# Patient Record
Sex: MALE | Race: WHITE | Employment: UNEMPLOYED | URBAN - METROPOLITAN AREA
[De-identification: names, ages, dates, MRNs, and addresses within clinical notes are randomized per-mention and may not be internally consistent; named-entity substitution may affect disease eponyms.]

---

## 2018-08-18 ENCOUNTER — HOSPITAL ENCOUNTER (EMERGENCY)
Age: 53
Discharge: HOME | End: 2018-08-18
Attending: EMERGENCY MEDICINE
Payer: MEDICAID

## 2018-08-18 VITALS
HEART RATE: 70 BPM | BODY MASS INDEX: 27.83 KG/M2 | OXYGEN SATURATION: 99 % | RESPIRATION RATE: 18 BRPM | TEMPERATURE: 98.4 F | HEIGHT: 73 IN | SYSTOLIC BLOOD PRESSURE: 107 MMHG | WEIGHT: 210 LBS | DIASTOLIC BLOOD PRESSURE: 65 MMHG

## 2018-08-18 DIAGNOSIS — R06.6 HICCUPS: Primary | ICD-10-CM

## 2018-08-18 LAB
A/G RATIO: 1.6 (ref 1.1–2.2)
ALBUMIN SERPL-MCNC: 4.2 G/DL (ref 3.4–5)
ALP BLD-CCNC: 71 U/L (ref 40–129)
ALT SERPL-CCNC: 12 U/L (ref 10–40)
ANION GAP SERPL CALCULATED.3IONS-SCNC: 10 MMOL/L (ref 3–16)
AST SERPL-CCNC: 14 U/L (ref 15–37)
BASOPHILS ABSOLUTE: 0.2 K/UL (ref 0–0.2)
BASOPHILS RELATIVE PERCENT: 1.2 %
BILIRUB SERPL-MCNC: 0.3 MG/DL (ref 0–1)
BUN BLDV-MCNC: 13 MG/DL (ref 7–20)
CALCIUM SERPL-MCNC: 9.2 MG/DL (ref 8.3–10.6)
CHLORIDE BLD-SCNC: 101 MMOL/L (ref 99–110)
CO2: 28 MMOL/L (ref 21–32)
CREAT SERPL-MCNC: 0.7 MG/DL (ref 0.9–1.3)
EOSINOPHILS ABSOLUTE: 0.2 K/UL (ref 0–0.6)
EOSINOPHILS RELATIVE PERCENT: 1.7 %
GFR AFRICAN AMERICAN: >60
GFR NON-AFRICAN AMERICAN: >60
GLOBULIN: 2.7 G/DL
GLUCOSE BLD-MCNC: 110 MG/DL (ref 70–99)
HCT VFR BLD CALC: 37.8 % (ref 40.5–52.5)
HEMOGLOBIN: 13.2 G/DL (ref 13.5–17.5)
LIPASE: 34 U/L (ref 13–60)
LYMPHOCYTES ABSOLUTE: 2.3 K/UL (ref 1–5.1)
LYMPHOCYTES RELATIVE PERCENT: 17.9 %
MCH RBC QN AUTO: 28.3 PG (ref 26–34)
MCHC RBC AUTO-ENTMCNC: 34.9 G/DL (ref 31–36)
MCV RBC AUTO: 81 FL (ref 80–100)
MONOCYTES ABSOLUTE: 1 K/UL (ref 0–1.3)
MONOCYTES RELATIVE PERCENT: 8.1 %
NEUTROPHILS ABSOLUTE: 9.1 K/UL (ref 1.7–7.7)
NEUTROPHILS RELATIVE PERCENT: 71.1 %
PDW BLD-RTO: 14.1 % (ref 12.4–15.4)
PLATELET # BLD: 255 K/UL (ref 135–450)
PMV BLD AUTO: 7.2 FL (ref 5–10.5)
POTASSIUM SERPL-SCNC: 3.7 MMOL/L (ref 3.5–5.1)
RBC # BLD: 4.66 M/UL (ref 4.2–5.9)
SODIUM BLD-SCNC: 139 MMOL/L (ref 136–145)
TOTAL PROTEIN: 6.9 G/DL (ref 6.4–8.2)
WBC # BLD: 12.8 K/UL (ref 4–11)

## 2018-08-18 PROCEDURE — 96374 THER/PROPH/DIAG INJ IV PUSH: CPT

## 2018-08-18 PROCEDURE — 2500000003 HC RX 250 WO HCPCS: Performed by: EMERGENCY MEDICINE

## 2018-08-18 PROCEDURE — 96361 HYDRATE IV INFUSION ADD-ON: CPT

## 2018-08-18 PROCEDURE — 83690 ASSAY OF LIPASE: CPT

## 2018-08-18 PROCEDURE — 99283 EMERGENCY DEPT VISIT LOW MDM: CPT

## 2018-08-18 PROCEDURE — 80053 COMPREHEN METABOLIC PANEL: CPT

## 2018-08-18 PROCEDURE — 6370000000 HC RX 637 (ALT 250 FOR IP): Performed by: PHYSICIAN ASSISTANT

## 2018-08-18 PROCEDURE — 96372 THER/PROPH/DIAG INJ SC/IM: CPT

## 2018-08-18 PROCEDURE — 85025 COMPLETE CBC W/AUTO DIFF WBC: CPT

## 2018-08-18 PROCEDURE — 2580000003 HC RX 258: Performed by: PHYSICIAN ASSISTANT

## 2018-08-18 PROCEDURE — 6360000002 HC RX W HCPCS: Performed by: PHYSICIAN ASSISTANT

## 2018-08-18 PROCEDURE — 96375 TX/PRO/DX INJ NEW DRUG ADDON: CPT

## 2018-08-18 RX ORDER — 0.9 % SODIUM CHLORIDE 0.9 %
1000 INTRAVENOUS SOLUTION INTRAVENOUS ONCE
Status: COMPLETED | OUTPATIENT
Start: 2018-08-18 | End: 2018-08-18

## 2018-08-18 RX ORDER — METOCLOPRAMIDE HYDROCHLORIDE 5 MG/ML
10 INJECTION INTRAMUSCULAR; INTRAVENOUS ONCE
Status: COMPLETED | OUTPATIENT
Start: 2018-08-18 | End: 2018-08-18

## 2018-08-18 RX ORDER — METOCLOPRAMIDE 10 MG/1
10 TABLET ORAL 4 TIMES DAILY
Qty: 30 TABLET | Refills: 0 | Status: SHIPPED | OUTPATIENT
Start: 2018-08-18 | End: 2019-08-22

## 2018-08-18 RX ORDER — CHLORPROMAZINE HYDROCHLORIDE 25 MG/ML
25 INJECTION INTRAMUSCULAR ONCE
Status: COMPLETED | OUTPATIENT
Start: 2018-08-18 | End: 2018-08-18

## 2018-08-18 RX ORDER — FAMOTIDINE 20 MG/1
20 TABLET, FILM COATED ORAL 2 TIMES DAILY
Qty: 60 TABLET | Refills: 0 | Status: SHIPPED | OUTPATIENT
Start: 2018-08-18 | End: 2019-08-22

## 2018-08-18 RX ORDER — ONDANSETRON 2 MG/ML
4 INJECTION INTRAMUSCULAR; INTRAVENOUS ONCE
Status: COMPLETED | OUTPATIENT
Start: 2018-08-18 | End: 2018-08-18

## 2018-08-18 RX ORDER — FAMOTIDINE 20 MG/1
40 TABLET, FILM COATED ORAL ONCE
Status: COMPLETED | OUTPATIENT
Start: 2018-08-18 | End: 2018-08-18

## 2018-08-18 RX ADMIN — ONDANSETRON HYDROCHLORIDE 4 MG: 2 INJECTION, SOLUTION INTRAMUSCULAR; INTRAVENOUS at 20:18

## 2018-08-18 RX ADMIN — METOCLOPRAMIDE 10 MG: 5 INJECTION, SOLUTION INTRAMUSCULAR; INTRAVENOUS at 20:18

## 2018-08-18 RX ADMIN — FAMOTIDINE 40 MG: 20 TABLET, FILM COATED ORAL at 20:18

## 2018-08-18 RX ADMIN — CHLORPROMAZINE HYDROCHLORIDE 25 MG: 25 INJECTION INTRAMUSCULAR at 21:30

## 2018-08-18 RX ADMIN — SODIUM CHLORIDE 1000 ML: 9 INJECTION, SOLUTION INTRAVENOUS at 20:18

## 2018-08-18 ASSESSMENT — PAIN SCALES - GENERAL: PAINLEVEL_OUTOF10: 7

## 2018-08-18 ASSESSMENT — PAIN DESCRIPTION - PAIN TYPE: TYPE: ACUTE PAIN

## 2018-08-18 NOTE — ED PROVIDER NOTES
08/18/18 2208   BP: 129/71 136/73 107/65   Pulse: 83 74 70   Resp: 18     Temp: 98.4 °F (36.9 °C)     TempSrc: Oral     SpO2: 98% 98% 99%   Weight: 210 lb (95.3 kg)     Height: 6' 1\" (1.854 m)         Patient was given the following medications:  Medications   0.9 % sodium chloride bolus (0 mLs Intravenous Stopped 8/18/18 2135)   metoclopramide (REGLAN) injection 10 mg (10 mg Intravenous Given 8/18/18 2018)   ondansetron (ZOFRAN) injection 4 mg (4 mg Intravenous Given 8/18/18 2018)   famotidine (PEPCID) tablet 40 mg (40 mg Oral Given 8/18/18 2018)   chlorproMAZINE (THORAZINE) injection 25 mg (25 mg Intramuscular Given 8/18/18 2130)     Patient presents to the emergency department with hiccups. He is afebrile, appeared nontoxic and hemodynamically stable. CBC shows leukocytosis of 12.8 with left shift of 9.1. Hemoglobin of 13.2 and hematocrit of 37.8. CMP shows glucose of 110, creatinine 0.7 and AST of 14. Lipase is within normal range. The patient was given IV fluids, Compazine, Zofran, Thorazine and Pepcid here. He states that his hiccups are gone. I feel that it is safe to discharge patient home and have him follow-up with primary care. I will discharge him with prescriptions for Pepcid and Reglan. Instructed him to come back for any new, concerning or worsening symptoms. The patient tolerated their visit well. They were seen and evaluated by the attending physician who agreed with the assessment and plan. The patient and / or the family were informed of the results of any tests, a time was given to answer questions, a plan was proposed and they agreed with plan. FINAL IMPRESSION      1.  Hiccups          DISPOSITION/PLAN   DISPOSITION Decision To Discharge 08/18/2018 08:50:42 PM      PATIENT REFERRED TO:  DO Dale Barton 42  [phone]    Schedule an appointment as soon as possible for a visit       [facility] ED  3000

## 2018-08-19 NOTE — ED NOTES
Pt called out from room to report that hiccups has returned. Relayed this to Dr. Karthik Cannon. No new verbal orders.       Kasey Moses RN  08/18/18 9433

## 2019-08-22 ENCOUNTER — HOSPITAL ENCOUNTER (EMERGENCY)
Age: 54
Discharge: HOME | End: 2019-08-22

## 2019-08-22 ENCOUNTER — APPOINTMENT (OUTPATIENT)
Dept: GENERAL RADIOLOGY | Age: 54
End: 2019-08-22

## 2019-08-22 VITALS
OXYGEN SATURATION: 98 % | HEART RATE: 80 BPM | RESPIRATION RATE: 16 BRPM | WEIGHT: 220 LBS | HEIGHT: 73 IN | SYSTOLIC BLOOD PRESSURE: 132 MMHG | DIASTOLIC BLOOD PRESSURE: 77 MMHG | BODY MASS INDEX: 29.16 KG/M2 | TEMPERATURE: 97.9 F

## 2019-08-22 DIAGNOSIS — T14.8XXA WOUND INFECTION: Primary | ICD-10-CM

## 2019-08-22 DIAGNOSIS — L03.116 CELLULITIS OF LEFT LOWER EXTREMITY: ICD-10-CM

## 2019-08-22 DIAGNOSIS — L08.9 WOUND INFECTION: Primary | ICD-10-CM

## 2019-08-22 LAB
A/G RATIO: 1.3 (ref 1.1–2.2)
ALBUMIN SERPL-MCNC: 4.4 G/DL (ref 3.4–5)
ALP BLD-CCNC: 91 U/L (ref 40–129)
ALT SERPL-CCNC: 35 U/L (ref 10–40)
ANION GAP SERPL CALCULATED.3IONS-SCNC: 15 MMOL/L (ref 3–16)
AST SERPL-CCNC: 28 U/L (ref 15–37)
BASOPHILS ABSOLUTE: 0 K/UL (ref 0–0.2)
BASOPHILS RELATIVE PERCENT: 0.1 %
BILIRUB SERPL-MCNC: 0.4 MG/DL (ref 0–1)
BUN BLDV-MCNC: 11 MG/DL (ref 7–20)
CALCIUM SERPL-MCNC: 9.4 MG/DL (ref 8.3–10.6)
CHLORIDE BLD-SCNC: 96 MMOL/L (ref 99–110)
CO2: 26 MMOL/L (ref 21–32)
CREAT SERPL-MCNC: 1.3 MG/DL (ref 0.9–1.3)
EOSINOPHILS ABSOLUTE: 0.1 K/UL (ref 0–0.6)
EOSINOPHILS RELATIVE PERCENT: 1.3 %
GFR AFRICAN AMERICAN: >60
GFR NON-AFRICAN AMERICAN: 57
GLOBULIN: 3.5 G/DL
GLUCOSE BLD-MCNC: 119 MG/DL (ref 70–99)
HCT VFR BLD CALC: 38.6 % (ref 40.5–52.5)
HEMOGLOBIN: 13.6 G/DL (ref 13.5–17.5)
LACTIC ACID, SEPSIS: 1.7 MMOL/L (ref 0.4–1.9)
LYMPHOCYTES ABSOLUTE: 1.9 K/UL (ref 1–5.1)
LYMPHOCYTES RELATIVE PERCENT: 21.1 %
MCH RBC QN AUTO: 30.4 PG (ref 26–34)
MCHC RBC AUTO-ENTMCNC: 35.3 G/DL (ref 31–36)
MCV RBC AUTO: 86.1 FL (ref 80–100)
MONOCYTES ABSOLUTE: 0.6 K/UL (ref 0–1.3)
MONOCYTES RELATIVE PERCENT: 6.7 %
NEUTROPHILS ABSOLUTE: 6.4 K/UL (ref 1.7–7.7)
NEUTROPHILS RELATIVE PERCENT: 70.8 %
PDW BLD-RTO: 12.8 % (ref 12.4–15.4)
PLATELET # BLD: 308 K/UL (ref 135–450)
PMV BLD AUTO: 7 FL (ref 5–10.5)
POTASSIUM REFLEX MAGNESIUM: 3.8 MMOL/L (ref 3.5–5.1)
RBC # BLD: 4.49 M/UL (ref 4.2–5.9)
SODIUM BLD-SCNC: 137 MMOL/L (ref 136–145)
TOTAL PROTEIN: 7.9 G/DL (ref 6.4–8.2)
WBC # BLD: 9.1 K/UL (ref 4–11)

## 2019-08-22 PROCEDURE — 83605 ASSAY OF LACTIC ACID: CPT

## 2019-08-22 PROCEDURE — 73590 X-RAY EXAM OF LOWER LEG: CPT

## 2019-08-22 PROCEDURE — 90471 IMMUNIZATION ADMIN: CPT | Performed by: PHYSICIAN ASSISTANT

## 2019-08-22 PROCEDURE — 6360000002 HC RX W HCPCS: Performed by: PHYSICIAN ASSISTANT

## 2019-08-22 PROCEDURE — 99284 EMERGENCY DEPT VISIT MOD MDM: CPT

## 2019-08-22 PROCEDURE — 85025 COMPLETE CBC W/AUTO DIFF WBC: CPT

## 2019-08-22 PROCEDURE — 80053 COMPREHEN METABOLIC PANEL: CPT

## 2019-08-22 PROCEDURE — 6370000000 HC RX 637 (ALT 250 FOR IP): Performed by: PHYSICIAN ASSISTANT

## 2019-08-22 PROCEDURE — 90715 TDAP VACCINE 7 YRS/> IM: CPT | Performed by: PHYSICIAN ASSISTANT

## 2019-08-22 RX ORDER — CEPHALEXIN 500 MG/1
500 CAPSULE ORAL 4 TIMES DAILY
Qty: 28 CAPSULE | Refills: 0 | Status: SHIPPED | OUTPATIENT
Start: 2019-08-22 | End: 2019-08-29

## 2019-08-22 RX ORDER — CEPHALEXIN 500 MG/1
500 CAPSULE ORAL ONCE
Status: COMPLETED | OUTPATIENT
Start: 2019-08-22 | End: 2019-08-22

## 2019-08-22 RX ORDER — SULFAMETHOXAZOLE AND TRIMETHOPRIM 800; 160 MG/1; MG/1
1 TABLET ORAL ONCE
Status: COMPLETED | OUTPATIENT
Start: 2019-08-22 | End: 2019-08-22

## 2019-08-22 RX ORDER — SULFAMETHOXAZOLE AND TRIMETHOPRIM 800; 160 MG/1; MG/1
1 TABLET ORAL 2 TIMES DAILY
Qty: 14 TABLET | Refills: 0 | Status: SHIPPED | OUTPATIENT
Start: 2019-08-22 | End: 2019-08-29

## 2019-08-22 RX ORDER — HYDROCODONE BITARTRATE AND ACETAMINOPHEN 5; 325 MG/1; MG/1
1 TABLET ORAL EVERY 6 HOURS PRN
Qty: 6 TABLET | Refills: 0 | Status: SHIPPED | OUTPATIENT
Start: 2019-08-22 | End: 2019-08-25

## 2019-08-22 RX ORDER — HYDROCODONE BITARTRATE AND ACETAMINOPHEN 5; 325 MG/1; MG/1
1 TABLET ORAL ONCE
Status: DISCONTINUED | OUTPATIENT
Start: 2019-08-22 | End: 2019-08-22 | Stop reason: HOSPADM

## 2019-08-22 RX ADMIN — CEPHALEXIN 500 MG: 500 CAPSULE ORAL at 19:55

## 2019-08-22 RX ADMIN — SULFAMETHOXAZOLE AND TRIMETHOPRIM 1 TABLET: 800; 160 TABLET ORAL at 19:55

## 2019-08-22 RX ADMIN — TETANUS TOXOID, REDUCED DIPHTHERIA TOXOID AND ACELLULAR PERTUSSIS VACCINE, ADSORBED 0.5 ML: 5; 2.5; 8; 8; 2.5 SUSPENSION INTRAMUSCULAR at 19:02

## 2019-08-22 ASSESSMENT — PAIN DESCRIPTION - PAIN TYPE: TYPE: ACUTE PAIN

## 2019-08-22 ASSESSMENT — PAIN SCALES - GENERAL
PAINLEVEL_OUTOF10: 8
PAINLEVEL_OUTOF10: 8

## 2019-08-22 ASSESSMENT — ENCOUNTER SYMPTOMS
RESPIRATORY NEGATIVE: 1
GASTROINTESTINAL NEGATIVE: 1

## 2019-08-22 ASSESSMENT — PAIN DESCRIPTION - LOCATION: LOCATION: LEG

## 2019-08-22 ASSESSMENT — PAIN DESCRIPTION - ORIENTATION: ORIENTATION: LEFT;LOWER

## 2019-08-22 ASSESSMENT — PAIN DESCRIPTION - DESCRIPTORS: DESCRIPTORS: SHARP;SHOOTING

## 2019-08-23 NOTE — ED NOTES
Discharge instructions and prescriptions reviewed with patient. Patient denies having questions. Patient made aware not to drive on [place] as it is a narcotic. Patient stated he understood. Patient denies needs. Patient left in stable condition.       Wendi Ramirez RN  08/22/19 2038

## 2019-09-12 ENCOUNTER — HOSPITAL ENCOUNTER (EMERGENCY)
Age: 54
Discharge: HOME | End: 2019-09-12

## 2019-09-12 VITALS
DIASTOLIC BLOOD PRESSURE: 93 MMHG | BODY MASS INDEX: 29.16 KG/M2 | WEIGHT: 220 LBS | TEMPERATURE: 99.5 F | RESPIRATION RATE: 18 BRPM | HEIGHT: 73 IN | OXYGEN SATURATION: 99 % | SYSTOLIC BLOOD PRESSURE: 132 MMHG | HEART RATE: 93 BPM

## 2019-09-12 DIAGNOSIS — S81.812A LACERATION OF LEFT LOWER EXTREMITY, INITIAL ENCOUNTER: ICD-10-CM

## 2019-09-12 DIAGNOSIS — M79.89 LEG SWELLING: ICD-10-CM

## 2019-09-12 DIAGNOSIS — L03.116 CELLULITIS OF LEFT LOWER EXTREMITY: Primary | ICD-10-CM

## 2019-09-12 LAB
A/G RATIO: 1.4 (ref 1.1–2.2)
ALBUMIN SERPL-MCNC: 4.6 G/DL (ref 3.4–5)
ALP BLD-CCNC: 79 U/L (ref 40–129)
ALT SERPL-CCNC: 41 U/L (ref 10–40)
ANION GAP SERPL CALCULATED.3IONS-SCNC: 11 MMOL/L (ref 3–16)
AST SERPL-CCNC: 31 U/L (ref 15–37)
BASOPHILS ABSOLUTE: 0.1 K/UL (ref 0–0.2)
BASOPHILS RELATIVE PERCENT: 0.9 %
BILIRUB SERPL-MCNC: 0.7 MG/DL (ref 0–1)
BUN BLDV-MCNC: 9 MG/DL (ref 7–20)
CALCIUM SERPL-MCNC: 9.5 MG/DL (ref 8.3–10.6)
CHLORIDE BLD-SCNC: 98 MMOL/L (ref 99–110)
CO2: 24 MMOL/L (ref 21–32)
CREAT SERPL-MCNC: 1 MG/DL (ref 0.9–1.3)
EOSINOPHILS ABSOLUTE: 0.1 K/UL (ref 0–0.6)
EOSINOPHILS RELATIVE PERCENT: 0.8 %
GFR AFRICAN AMERICAN: >60
GFR NON-AFRICAN AMERICAN: >60
GLOBULIN: 3.4 G/DL
GLUCOSE BLD-MCNC: 109 MG/DL (ref 70–99)
HCT VFR BLD CALC: 38.6 % (ref 40.5–52.5)
HEMOGLOBIN: 13.2 G/DL (ref 13.5–17.5)
LACTIC ACID, SEPSIS: 0.8 MMOL/L (ref 0.4–1.9)
LYMPHOCYTES ABSOLUTE: 1.6 K/UL (ref 1–5.1)
LYMPHOCYTES RELATIVE PERCENT: 13.3 %
MCH RBC QN AUTO: 30.1 PG (ref 26–34)
MCHC RBC AUTO-ENTMCNC: 34.2 G/DL (ref 31–36)
MCV RBC AUTO: 88 FL (ref 80–100)
MONOCYTES ABSOLUTE: 0.9 K/UL (ref 0–1.3)
MONOCYTES RELATIVE PERCENT: 7.7 %
NEUTROPHILS ABSOLUTE: 9.4 K/UL (ref 1.7–7.7)
NEUTROPHILS RELATIVE PERCENT: 77.3 %
PDW BLD-RTO: 13.4 % (ref 12.4–15.4)
PLATELET # BLD: 254 K/UL (ref 135–450)
PMV BLD AUTO: 7.1 FL (ref 5–10.5)
POTASSIUM SERPL-SCNC: 3.9 MMOL/L (ref 3.5–5.1)
RBC # BLD: 4.39 M/UL (ref 4.2–5.9)
SODIUM BLD-SCNC: 133 MMOL/L (ref 136–145)
TOTAL PROTEIN: 8 G/DL (ref 6.4–8.2)
WBC # BLD: 12.2 K/UL (ref 4–11)

## 2019-09-12 PROCEDURE — 85025 COMPLETE CBC W/AUTO DIFF WBC: CPT

## 2019-09-12 PROCEDURE — 6370000000 HC RX 637 (ALT 250 FOR IP): Performed by: PHYSICIAN ASSISTANT

## 2019-09-12 PROCEDURE — 99283 EMERGENCY DEPT VISIT LOW MDM: CPT

## 2019-09-12 PROCEDURE — 6360000002 HC RX W HCPCS: Performed by: PHYSICIAN ASSISTANT

## 2019-09-12 PROCEDURE — 87040 BLOOD CULTURE FOR BACTERIA: CPT

## 2019-09-12 PROCEDURE — 87070 CULTURE OTHR SPECIMN AEROBIC: CPT

## 2019-09-12 PROCEDURE — 83605 ASSAY OF LACTIC ACID: CPT

## 2019-09-12 PROCEDURE — 87077 CULTURE AEROBIC IDENTIFY: CPT

## 2019-09-12 PROCEDURE — 2580000003 HC RX 258: Performed by: PHYSICIAN ASSISTANT

## 2019-09-12 PROCEDURE — 87186 SC STD MICRODIL/AGAR DIL: CPT

## 2019-09-12 PROCEDURE — 80053 COMPREHEN METABOLIC PANEL: CPT

## 2019-09-12 PROCEDURE — 96365 THER/PROPH/DIAG IV INF INIT: CPT

## 2019-09-12 PROCEDURE — 87205 SMEAR GRAM STAIN: CPT

## 2019-09-12 RX ORDER — 0.9 % SODIUM CHLORIDE 0.9 %
30 INTRAVENOUS SOLUTION INTRAVENOUS ONCE
Status: COMPLETED | OUTPATIENT
Start: 2019-09-12 | End: 2019-09-12

## 2019-09-12 RX ORDER — CLINDAMYCIN HYDROCHLORIDE 150 MG/1
450 CAPSULE ORAL 3 TIMES DAILY
Qty: 90 CAPSULE | Refills: 0 | Status: SHIPPED | OUTPATIENT
Start: 2019-09-12 | End: 2019-09-22

## 2019-09-12 RX ORDER — CLINDAMYCIN HYDROCHLORIDE 150 MG/1
450 CAPSULE ORAL ONCE
Status: COMPLETED | OUTPATIENT
Start: 2019-09-12 | End: 2019-09-12

## 2019-09-12 RX ADMIN — PIPERACILLIN SODIUM AND TAZOBACTAM SODIUM 3.38 G: 3; .375 INJECTION, POWDER, LYOPHILIZED, FOR SOLUTION INTRAVENOUS at 20:47

## 2019-09-12 RX ADMIN — CLINDAMYCIN HYDROCHLORIDE 450 MG: 150 CAPSULE ORAL at 22:06

## 2019-09-12 RX ADMIN — SODIUM CHLORIDE 2994 ML: 9 INJECTION, SOLUTION INTRAVENOUS at 20:46

## 2019-09-12 ASSESSMENT — PAIN DESCRIPTION - PAIN TYPE: TYPE: ACUTE PAIN;CHRONIC PAIN

## 2019-09-12 ASSESSMENT — PAIN SCALES - GENERAL: PAINLEVEL_OUTOF10: 10

## 2019-09-12 ASSESSMENT — PAIN DESCRIPTION - ORIENTATION: ORIENTATION: LEFT

## 2019-09-12 ASSESSMENT — PAIN DESCRIPTION - LOCATION: LOCATION: LEG

## 2019-09-14 ASSESSMENT — ENCOUNTER SYMPTOMS
COLOR CHANGE: 0
COUGH: 0
VOMITING: 0
NAUSEA: 0
SHORTNESS OF BREATH: 0

## 2019-09-14 NOTE — ED PROVIDER NOTES
ALLERGIES     Patient has no known allergies. FAMILY HISTORY     History reviewed. No pertinent family history. SOCIAL HISTORY       Social History     Socioeconomic History    Marital status:      Spouse name: None    Number of children: None    Years of education: None    Highest education level: None   Occupational History    None   Social Needs    Financial resource strain: None    Food insecurity:     Worry: None     Inability: None    Transportation needs:     Medical: None     Non-medical: None   Tobacco Use    Smoking status: Never Smoker    Smokeless tobacco: Never Used   Substance and Sexual Activity    Alcohol use: Yes     Alcohol/week: 4.0 standard drinks     Types: 4 Cans of beer per week     Comment: occ    Drug use: Not Currently     Types: Marijuana    Sexual activity: None   Lifestyle    Physical activity:     Days per week: None     Minutes per session: None    Stress: None   Relationships    Social connections:     Talks on phone: None     Gets together: None     Attends Congregational service: None     Active member of club or organization: None     Attends meetings of clubs or organizations: None     Relationship status: None    Intimate partner violence:     Fear of current or ex partner: None     Emotionally abused: None     Physically abused: None     Forced sexual activity: None   Other Topics Concern    None   Social History Narrative    None       SCREENINGS     NIH Score       [facility]     Heart Score         PHYSICAL EXAM    (up to 7 for level 4, 8 ormore for level 5)     ED Triage Vitals [09/12/19 1927]   BP Temp Temp Source Pulse Resp SpO2 Height Weight   137/82 99.5 °F (37.5 °C) Oral 98 18 96 % 6' 1\" (1.854 m) 220 lb (99.8 kg)       Physical Exam   Constitutional: He appears well-developed and well-nourished. HENT:   Head: Normocephalic.    Nose: Nose normal.   Mouth/Throat: Oropharynx is clear and moist. No oropharyngeal exudate. Eyes: Pupils are equal, round, and reactive to light. Conjunctivae and EOM are normal. Right eye exhibits no discharge. Left eye exhibits no discharge. Neck: Normal range of motion. Cardiovascular: Normal rate, regular rhythm and normal heart sounds. Exam reveals no gallop and no friction rub. No murmur heard. Pulmonary/Chest: Effort normal and breath sounds normal. No respiratory distress. He has no wheezes. Abdominal: Soft. Bowel sounds are normal. He exhibits no distension. There is no tenderness. Musculoskeletal: Normal range of motion. Neurological: He is alert. Skin: Skin is warm and dry. He is not diaphoretic. Psychiatric: He has a normal mood and affect. His behavior is normal.   Nursing note and vitals reviewed.           DIAGNOSTIC RESULTS   LABS:    Labs Reviewed   WOUND CULTURE - Abnormal; Notable for the following components:       Result Value    Gram Stain Result   (*)     Value: 1+ WBC's  1+ Gram positive cocci      Organism Staphylococcus aureus (*)     All other components within normal limits    Narrative:     ORDER#: [id number]                          ORDERED BY: DAWIT CALDERON  SOURCE: [employer]                           COLLECTED:  09/12/19 20:14  ANTIBIOTICS AT [facility].:                      RECEIVED :  09/13/19 07:19  Performed at:  [place] [address]   Phone [phone]   CBC WITH AUTO DIFFERENTIAL - Abnormal; Notable for the following components:    WBC 12.2 (*)     Hemoglobin 13.2 (*)     Hematocrit 38.6 (*)     Neutrophils Absolute 9.4 (*)     All other components within normal limits    Narrative:     Performed at:  [facility]) - [facility] [address],  [place], [facility]   Phone [phone]   COMPREHENSIVE METABOLIC PANEL - Abnormal; Notable for the following components:    Sodium 133 (*)     Chloride 98 (*)     Glucose 109 (*)     ALT 41 (*)     All other

## 2019-09-15 LAB
GRAM STAIN RESULT: ABNORMAL
ORGANISM: ABNORMAL
WOUND/ABSCESS: ABNORMAL

## 2019-09-17 LAB
BLOOD CULTURE, ROUTINE: NORMAL
CULTURE, BLOOD 2: NORMAL

## 2020-12-24 ENCOUNTER — APPOINTMENT (OUTPATIENT)
Dept: CT IMAGING | Age: 55
DRG: 829 | End: 2020-12-24

## 2020-12-24 ENCOUNTER — HOSPITAL ENCOUNTER (INPATIENT)
Age: 55
LOS: 7 days | Discharge: OTHER INSTITUTION | DRG: 829 | End: 2020-12-31
Attending: EMERGENCY MEDICINE | Admitting: INTERNAL MEDICINE
Payer: COMMERCIAL

## 2020-12-24 ENCOUNTER — APPOINTMENT (OUTPATIENT)
Dept: GENERAL RADIOLOGY | Age: 55
DRG: 829 | End: 2020-12-24

## 2020-12-24 ENCOUNTER — APPOINTMENT (OUTPATIENT)
Dept: ULTRASOUND IMAGING | Age: 55
DRG: 829 | End: 2020-12-24

## 2020-12-24 DIAGNOSIS — C34.90 MALIGNANT NEOPLASM OF LUNG, UNSPECIFIED LATERALITY, UNSPECIFIED PART OF LUNG (HCC): ICD-10-CM

## 2020-12-24 DIAGNOSIS — I26.93 SINGLE SUBSEGMENTAL PULMONARY EMBOLISM WITHOUT ACUTE COR PULMONALE (HCC): ICD-10-CM

## 2020-12-24 DIAGNOSIS — J91.0 MALIGNANT PLEURAL EFFUSION: Primary | ICD-10-CM

## 2020-12-24 DIAGNOSIS — R59.0 AXILLARY LYMPHADENOPATHY: ICD-10-CM

## 2020-12-24 PROBLEM — I26.99 ACUTE PULMONARY EMBOLISM (HCC): Status: ACTIVE | Noted: 2020-12-24

## 2020-12-24 LAB
A/G RATIO: 1 (ref 1.1–2.2)
ALBUMIN SERPL-MCNC: 3.2 G/DL (ref 3.4–5)
ALP BLD-CCNC: 83 U/L (ref 40–129)
ALT SERPL-CCNC: 9 U/L (ref 10–40)
ANION GAP SERPL CALCULATED.3IONS-SCNC: 8 MMOL/L (ref 3–16)
APPEARANCE FLUID: NORMAL
APTT: 33 SEC (ref 24.2–36.2)
APTT: 54.8 SEC (ref 24.2–36.2)
AST SERPL-CCNC: 6 U/L (ref 15–37)
BASOPHILS ABSOLUTE: 0.1 K/UL (ref 0–0.2)
BASOPHILS RELATIVE PERCENT: 0.9 %
BILIRUB SERPL-MCNC: <0.2 MG/DL (ref 0–1)
BILIRUBIN URINE: NEGATIVE
BLOOD, URINE: NEGATIVE
BUN BLDV-MCNC: 15 MG/DL (ref 7–20)
CALCIUM SERPL-MCNC: 9 MG/DL (ref 8.3–10.6)
CELL COUNT FLUID TYPE: NORMAL
CHLORIDE BLD-SCNC: 101 MMOL/L (ref 99–110)
CLARITY: CLEAR
CLOT EVALUATION: NORMAL
CO2: 29 MMOL/L (ref 21–32)
COLOR FLUID: NORMAL
COLOR: YELLOW
CREAT SERPL-MCNC: 1 MG/DL (ref 0.9–1.3)
EOSINOPHILS ABSOLUTE: 0.2 K/UL (ref 0–0.6)
EOSINOPHILS RELATIVE PERCENT: 1.9 %
GFR AFRICAN AMERICAN: >60
GFR NON-AFRICAN AMERICAN: >60
GLOBULIN: 3.3 G/DL
GLUCOSE BLD-MCNC: 88 MG/DL (ref 70–99)
GLUCOSE URINE: NEGATIVE MG/DL
HCT VFR BLD CALC: 40.1 % (ref 40.5–52.5)
HEMOGLOBIN: 13.2 G/DL (ref 13.5–17.5)
KETONES, URINE: NEGATIVE MG/DL
LEUKOCYTE ESTERASE, URINE: NEGATIVE
LIPASE: 18 U/L (ref 13–60)
LYMPHOCYTES ABSOLUTE: 1.3 K/UL (ref 1–5.1)
LYMPHOCYTES RELATIVE PERCENT: 10.8 %
LYMPHOCYTES, BODY FLUID: 79 %
MACROPHAGE FLUID: 19 %
MCH RBC QN AUTO: 25.5 PG (ref 26–34)
MCHC RBC AUTO-ENTMCNC: 32.9 G/DL (ref 31–36)
MCV RBC AUTO: 77.4 FL (ref 80–100)
MICROSCOPIC EXAMINATION: NORMAL
MONOCYTES ABSOLUTE: 1 K/UL (ref 0–1.3)
MONOCYTES RELATIVE PERCENT: 8.7 %
NEUTROPHIL, FLUID: 2 %
NEUTROPHILS ABSOLUTE: 9 K/UL (ref 1.7–7.7)
NEUTROPHILS RELATIVE PERCENT: 77.7 %
NITRITE, URINE: NEGATIVE
NUCLEATED CELLS FLUID: 94 /CUMM
NUMBER OF CELLS COUNTED FLUID: 100
PDW BLD-RTO: 17.2 % (ref 12.4–15.4)
PH UA: 5 (ref 5–8)
PLATELET # BLD: 460 K/UL (ref 135–450)
PMV BLD AUTO: 6.6 FL (ref 5–10.5)
POTASSIUM REFLEX MAGNESIUM: 4.6 MMOL/L (ref 3.5–5.1)
PROTEIN UA: NEGATIVE MG/DL
RBC # BLD: 5.18 M/UL (ref 4.2–5.9)
RBC FLUID: NORMAL /CUMM
SARS-COV-2, NAAT: NOT DETECTED
SODIUM BLD-SCNC: 138 MMOL/L (ref 136–145)
SPECIFIC GRAVITY UA: 1.02 (ref 1–1.03)
TOTAL PROTEIN: 6.5 G/DL (ref 6.4–8.2)
URINE REFLEX TO CULTURE: NORMAL
URINE TYPE: NORMAL
UROBILINOGEN, URINE: 0.2 E.U./DL
WBC # BLD: 11.6 K/UL (ref 4–11)

## 2020-12-24 PROCEDURE — U0002 COVID-19 LAB TEST NON-CDC: HCPCS

## 2020-12-24 PROCEDURE — 2580000003 HC RX 258: Performed by: PHYSICIAN ASSISTANT

## 2020-12-24 PROCEDURE — 82945 GLUCOSE OTHER FLUID: CPT

## 2020-12-24 PROCEDURE — 87205 SMEAR GRAM STAIN: CPT

## 2020-12-24 PROCEDURE — 6360000002 HC RX W HCPCS: Performed by: PHYSICIAN ASSISTANT

## 2020-12-24 PROCEDURE — 80053 COMPREHEN METABOLIC PANEL: CPT

## 2020-12-24 PROCEDURE — 6360000002 HC RX W HCPCS: Performed by: EMERGENCY MEDICINE

## 2020-12-24 PROCEDURE — 0W993ZZ DRAINAGE OF RIGHT PLEURAL CAVITY, PERCUTANEOUS APPROACH: ICD-10-PCS | Performed by: RADIOLOGY

## 2020-12-24 PROCEDURE — 71045 X-RAY EXAM CHEST 1 VIEW: CPT

## 2020-12-24 PROCEDURE — 89051 BODY FLUID CELL COUNT: CPT

## 2020-12-24 PROCEDURE — 84157 ASSAY OF PROTEIN OTHER: CPT

## 2020-12-24 PROCEDURE — 71260 CT THORAX DX C+: CPT

## 2020-12-24 PROCEDURE — 85730 THROMBOPLASTIN TIME PARTIAL: CPT

## 2020-12-24 PROCEDURE — 2060000000 HC ICU INTERMEDIATE R&B

## 2020-12-24 PROCEDURE — 74177 CT ABD & PELVIS W/CONTRAST: CPT

## 2020-12-24 PROCEDURE — 85025 COMPLETE CBC W/AUTO DIFF WBC: CPT

## 2020-12-24 PROCEDURE — 6360000004 HC RX CONTRAST MEDICATION: Performed by: PHYSICIAN ASSISTANT

## 2020-12-24 PROCEDURE — 96365 THER/PROPH/DIAG IV INF INIT: CPT

## 2020-12-24 PROCEDURE — 36415 COLL VENOUS BLD VENIPUNCTURE: CPT

## 2020-12-24 PROCEDURE — 87070 CULTURE OTHR SPECIMN AEROBIC: CPT

## 2020-12-24 PROCEDURE — 96376 TX/PRO/DX INJ SAME DRUG ADON: CPT

## 2020-12-24 PROCEDURE — 87040 BLOOD CULTURE FOR BACTERIA: CPT

## 2020-12-24 PROCEDURE — 32555 ASPIRATE PLEURA W/ IMAGING: CPT

## 2020-12-24 PROCEDURE — 81003 URINALYSIS AUTO W/O SCOPE: CPT

## 2020-12-24 PROCEDURE — 6360000002 HC RX W HCPCS

## 2020-12-24 PROCEDURE — 88305 TISSUE EXAM BY PATHOLOGIST: CPT

## 2020-12-24 PROCEDURE — 88112 CYTOPATH CELL ENHANCE TECH: CPT

## 2020-12-24 PROCEDURE — 96375 TX/PRO/DX INJ NEW DRUG ADDON: CPT

## 2020-12-24 PROCEDURE — 83690 ASSAY OF LIPASE: CPT

## 2020-12-24 PROCEDURE — 99285 EMERGENCY DEPT VISIT HI MDM: CPT

## 2020-12-24 RX ORDER — ACETAMINOPHEN 325 MG/1
650 TABLET ORAL EVERY 4 HOURS PRN
Status: DISCONTINUED | OUTPATIENT
Start: 2020-12-24 | End: 2020-12-30 | Stop reason: SDUPTHER

## 2020-12-24 RX ORDER — PROMETHAZINE HYDROCHLORIDE 25 MG/1
12.5 TABLET ORAL EVERY 6 HOURS PRN
Status: DISCONTINUED | OUTPATIENT
Start: 2020-12-24 | End: 2020-12-31 | Stop reason: HOSPADM

## 2020-12-24 RX ORDER — ACETAMINOPHEN 650 MG/1
650 SUPPOSITORY RECTAL EVERY 6 HOURS PRN
Status: DISCONTINUED | OUTPATIENT
Start: 2020-12-24 | End: 2020-12-31 | Stop reason: HOSPADM

## 2020-12-24 RX ORDER — ONDANSETRON 2 MG/ML
4 INJECTION INTRAMUSCULAR; INTRAVENOUS EVERY 6 HOURS PRN
Status: DISCONTINUED | OUTPATIENT
Start: 2020-12-24 | End: 2020-12-24

## 2020-12-24 RX ORDER — ONDANSETRON 2 MG/ML
INJECTION INTRAMUSCULAR; INTRAVENOUS
Status: COMPLETED
Start: 2020-12-24 | End: 2020-12-24

## 2020-12-24 RX ORDER — HEPARIN SODIUM 1000 [USP'U]/ML
80 INJECTION, SOLUTION INTRAVENOUS; SUBCUTANEOUS ONCE
Status: COMPLETED | OUTPATIENT
Start: 2020-12-24 | End: 2020-12-24

## 2020-12-24 RX ORDER — MORPHINE SULFATE 2 MG/ML
2 INJECTION, SOLUTION INTRAMUSCULAR; INTRAVENOUS
Status: DISCONTINUED | OUTPATIENT
Start: 2020-12-24 | End: 2020-12-31 | Stop reason: HOSPADM

## 2020-12-24 RX ORDER — MORPHINE SULFATE 4 MG/ML
4 INJECTION, SOLUTION INTRAMUSCULAR; INTRAVENOUS EVERY 30 MIN PRN
Status: DISCONTINUED | OUTPATIENT
Start: 2020-12-24 | End: 2020-12-24

## 2020-12-24 RX ORDER — POLYETHYLENE GLYCOL 3350 17 G/17G
17 POWDER, FOR SOLUTION ORAL DAILY PRN
Status: DISCONTINUED | OUTPATIENT
Start: 2020-12-24 | End: 2020-12-31 | Stop reason: HOSPADM

## 2020-12-24 RX ORDER — HEPARIN SODIUM 10000 [USP'U]/100ML
18 INJECTION, SOLUTION INTRAVENOUS CONTINUOUS
Status: DISCONTINUED | OUTPATIENT
Start: 2020-12-24 | End: 2020-12-25

## 2020-12-24 RX ORDER — MORPHINE SULFATE 4 MG/ML
4 INJECTION, SOLUTION INTRAMUSCULAR; INTRAVENOUS EVERY 4 HOURS PRN
Status: DISCONTINUED | OUTPATIENT
Start: 2020-12-24 | End: 2020-12-24

## 2020-12-24 RX ORDER — SODIUM CHLORIDE 9 MG/ML
INJECTION, SOLUTION INTRAVENOUS CONTINUOUS
Status: DISCONTINUED | OUTPATIENT
Start: 2020-12-24 | End: 2020-12-25

## 2020-12-24 RX ORDER — HEPARIN SODIUM 1000 [USP'U]/ML
40 INJECTION, SOLUTION INTRAVENOUS; SUBCUTANEOUS PRN
Status: DISCONTINUED | OUTPATIENT
Start: 2020-12-24 | End: 2020-12-26

## 2020-12-24 RX ORDER — HEPARIN SODIUM 1000 [USP'U]/ML
80 INJECTION, SOLUTION INTRAVENOUS; SUBCUTANEOUS PRN
Status: DISCONTINUED | OUTPATIENT
Start: 2020-12-24 | End: 2020-12-26

## 2020-12-24 RX ORDER — SODIUM CHLORIDE 0.9 % (FLUSH) 0.9 %
10 SYRINGE (ML) INJECTION PRN
Status: DISCONTINUED | OUTPATIENT
Start: 2020-12-24 | End: 2020-12-31 | Stop reason: HOSPADM

## 2020-12-24 RX ORDER — ACETAMINOPHEN 325 MG/1
650 TABLET ORAL EVERY 6 HOURS PRN
Status: DISCONTINUED | OUTPATIENT
Start: 2020-12-24 | End: 2020-12-31 | Stop reason: HOSPADM

## 2020-12-24 RX ORDER — SODIUM CHLORIDE 0.9 % (FLUSH) 0.9 %
10 SYRINGE (ML) INJECTION EVERY 12 HOURS SCHEDULED
Status: DISCONTINUED | OUTPATIENT
Start: 2020-12-24 | End: 2020-12-31 | Stop reason: HOSPADM

## 2020-12-24 RX ORDER — ONDANSETRON 2 MG/ML
4 INJECTION INTRAMUSCULAR; INTRAVENOUS EVERY 6 HOURS PRN
Status: DISCONTINUED | OUTPATIENT
Start: 2020-12-24 | End: 2020-12-31 | Stop reason: HOSPADM

## 2020-12-24 RX ORDER — ONDANSETRON 2 MG/ML
4 INJECTION INTRAMUSCULAR; INTRAVENOUS EVERY 30 MIN PRN
Status: DISCONTINUED | OUTPATIENT
Start: 2020-12-24 | End: 2020-12-24

## 2020-12-24 RX ADMIN — SODIUM CHLORIDE: 9 INJECTION, SOLUTION INTRAVENOUS at 12:48

## 2020-12-24 RX ADMIN — ONDANSETRON HYDROCHLORIDE 4 MG: 2 INJECTION, SOLUTION INTRAMUSCULAR; INTRAVENOUS at 15:09

## 2020-12-24 RX ADMIN — MORPHINE SULFATE 2 MG: 2 INJECTION, SOLUTION INTRAMUSCULAR; INTRAVENOUS at 22:25

## 2020-12-24 RX ADMIN — IOPAMIDOL 75 ML: 755 INJECTION, SOLUTION INTRAVENOUS at 13:38

## 2020-12-24 RX ADMIN — Medication 10 ML: at 22:25

## 2020-12-24 RX ADMIN — ONDANSETRON HYDROCHLORIDE 4 MG: 2 INJECTION, SOLUTION INTRAMUSCULAR; INTRAVENOUS at 18:42

## 2020-12-24 RX ADMIN — MORPHINE SULFATE 4 MG: 4 INJECTION INTRAVENOUS at 16:04

## 2020-12-24 RX ADMIN — MORPHINE SULFATE 4 MG: 4 INJECTION INTRAVENOUS at 18:43

## 2020-12-24 RX ADMIN — HEPARIN SODIUM 6530 UNITS: 1000 INJECTION, SOLUTION INTRAVENOUS; SUBCUTANEOUS at 16:45

## 2020-12-24 RX ADMIN — MORPHINE SULFATE 4 MG: 4 INJECTION INTRAVENOUS at 15:09

## 2020-12-24 RX ADMIN — HEPARIN SODIUM AND DEXTROSE 18 UNITS/KG/HR: 10000; 5 INJECTION INTRAVENOUS at 16:45

## 2020-12-24 ASSESSMENT — PAIN DESCRIPTION - LOCATION
LOCATION: ARM
LOCATION: BACK;CHEST;RIB CAGE
LOCATION: BACK

## 2020-12-24 ASSESSMENT — ENCOUNTER SYMPTOMS
COUGH: 1
RHINORRHEA: 0
COLOR CHANGE: 1
ABDOMINAL PAIN: 0
NAUSEA: 0
BACK PAIN: 0
EYE PAIN: 0
SORE THROAT: 0
SHORTNESS OF BREATH: 1
CONSTIPATION: 0
DIARRHEA: 0
VOMITING: 0

## 2020-12-24 ASSESSMENT — PAIN DESCRIPTION - ONSET: ONSET: ON-GOING

## 2020-12-24 ASSESSMENT — PAIN - FUNCTIONAL ASSESSMENT
PAIN_FUNCTIONAL_ASSESSMENT: ACTIVITIES ARE NOT PREVENTED
PAIN_FUNCTIONAL_ASSESSMENT: PREVENTS OR INTERFERES SOME ACTIVE ACTIVITIES AND ADLS

## 2020-12-24 ASSESSMENT — PAIN DESCRIPTION - DESCRIPTORS
DESCRIPTORS: SHARP
DESCRIPTORS: NUMBNESS;SHARP

## 2020-12-24 ASSESSMENT — PAIN DESCRIPTION - ORIENTATION
ORIENTATION: RIGHT
ORIENTATION: RIGHT
ORIENTATION: RIGHT;UPPER

## 2020-12-24 ASSESSMENT — PAIN SCALES - GENERAL
PAINLEVEL_OUTOF10: 10
PAINLEVEL_OUTOF10: 8
PAINLEVEL_OUTOF10: 5
PAINLEVEL_OUTOF10: 6

## 2020-12-24 ASSESSMENT — PAIN DESCRIPTION - PROGRESSION
CLINICAL_PROGRESSION: NOT CHANGED
CLINICAL_PROGRESSION: NOT CHANGED

## 2020-12-24 ASSESSMENT — PAIN DESCRIPTION - FREQUENCY
FREQUENCY: CONTINUOUS
FREQUENCY: CONTINUOUS

## 2020-12-24 NOTE — PROGRESS NOTES
Image guided thoracentesis completed. Dr. Morenita Gallagher performed a thoracentesis on the right. 800 ml of thin blood ccolored fluid withdrawn. Specimen sent to lab. Pt tolerated procedure without any signs or symptoms of distress. Post op chest x-ray completed. Vital signs stable see flow sheets. Pt transferred back to ER in stable condition via bed by this palmar and .

## 2020-12-24 NOTE — ED NOTES
Patient medicated per MAR, resting more comfortably. Patient provided with something to drink. No other needs at this time. Will continue to monitor.      Kell Downey RN  12/24/20 8989

## 2020-12-24 NOTE — CONSULTS
Types: 70 Cans of beer, 7 Shots of liquor per week     Comment: sober since 08-    Drug use: Not Currently     Frequency: 7.0 times per week     Types: Marijuana     Comment: clean since 08/07/2020    Sexual activity: Not on file   Lifestyle    Physical activity     Days per week: Not on file     Minutes per session: Not on file    Stress: Not on file   Relationships    Social connections     Talks on phone: Not on file     Gets together: Not on file     Attends Anabaptism service: Not on file     Active member of club or organization: Not on file     Attends meetings of clubs or organizations: Not on file     Relationship status: Not on file    Intimate partner violence     Fear of current or ex partner: Not on file     Emotionally abused: Not on file     Physically abused: Not on file     Forced sexual activity: Not on file   Other Topics Concern    Not on file   Social History Narrative    Not on file       FAMILY HISTORY:     No family history on file. ALLERGIES:     Allergies as of 12/24/2020    (No Known Allergies)       MEDICATIONS:     No current facility-administered medications on file prior to encounter. No current outpatient medications on file prior to encounter. REVIEW OF SYSTEMS:       10 point ROS completed. Pertinent positives in HPI, otherwise negative.      PHYSICAL EXAM:       Vitals:    12/25/20 0749   BP: 115/74   Pulse: 93   Resp: 18   Temp: 96.8 °F (36 °C)   SpO2: 96%       General appearance: alert and cooperative  Head: Normocephalic, without obvious abnormality, atraumatic  Neck: No palpable lymphadenopathy in supraclavicular or cervical chains  Lungs: Clear to auscultation bilaterally, no audible rales, wheezes or crackles  Heart: Regular rate and rhythm, S1, S2 normal  Abdomen: Soft, non-tender; bowel sounds normal; no masses,  no organomegaly  Extremities: without cyanosis, clubbing, edema or asymmetry  Skin: No jaundice, purpura or petechiae      LABS: Lab Results   Component Value Date    WBC 14.6 (H) 12/25/2020    HGB 12.9 (L) 12/25/2020    HCT 40.1 (L) 12/25/2020    MCV 80.0 12/25/2020     12/25/2020       Lab Results   Component Value Date    GLUCOSE 87 12/25/2020    BUN 12 12/25/2020    CREATININE 0.8 (L) 12/25/2020    K 4.6 12/25/2020       Lab Results   Component Value Date    ALKPHOS 85 12/25/2020    ALT 13 12/25/2020    AST 9 (L) 12/25/2020    BILITOT <0.2 12/25/2020    PROT 5.9 (L) 12/25/2020       IMAGING:     Ct Abdomen Pelvis W Iv Contrast Additional Contrast? None  Result Date: 12/24/2020  Chest: Small pulmonary emboli left lower lobe Marked abnormal mediastinal adenopathy, with compression and obscuration of the airways on the right. There is collapse and consolidation seen of the right upper, right middle lobe, and right lower lobe. There is abnormal pleural thickening on the right, suspicious for pleural implants, and a large loculated pleural effusion on the right. Metastatic adenopathy seen in the right axillary region, and supraclavicular region. Abdomen pelvis: There is widespread infiltrative nodular soft tissue density throughout the abdomen and pelvis, compatible with sequelae of peritoneal carcinomatosis. There is abnormal adenopathy in the gastrohepatic ligament, and retroperitoneum along with mild splenomegaly Ill-defined hypodense nodule in the liver and spleen, possibly metastatic foci Results discussed with Anne Hoffman by Raysa Whitfield.  Constance Young MD at 2:23 pm on 12/24/2020     Ct Chest Pulmonary Embolism W Contrast  Result Date: 12/24/2020 Chest: Small pulmonary emboli left lower lobe Marked abnormal mediastinal adenopathy, with compression and obscuration of the airways on the right. There is collapse and consolidation seen of the right upper, right middle lobe, and right lower lobe. There is abnormal pleural thickening on the right, suspicious for pleural implants, and a large loculated pleural effusion on the right. Metastatic adenopathy seen in the right axillary region, and supraclavicular region. Abdomen pelvis: There is widespread infiltrative nodular soft tissue density throughout the abdomen and pelvis, compatible with sequelae of peritoneal carcinomatosis. There is abnormal adenopathy in the gastrohepatic ligament, and retroperitoneum along with mild splenomegaly Ill-defined hypodense nodule in the liver and spleen, possibly metastatic foci Results discussed with Kassy Meza by Phoenix Liriano. Shawnee Thomas MD at 2:23 pm on 12/24/2020       ASSESSMENT/PLAN:     1. CT Scan Suspicious for Malignancy    - CT CAP 12/24/2020 with mediastinal LAD with airway compression, R lung consolidation and collapse, pleural implants, pleural effusion, axillary and supraclavicular LAD, peritoneal carcinomatosis and possible liver and spleen metastasis  - CT scan highly suspicious for malignancy  - check pleural fluid cytology   - also consider biopsy  - await pulmonary recommendations - if bronch indicated, could hope to get mediastinal LN biopsy, otherwise could attempt axillary LN or liver biopsy next week  - further recommendations to follow once diagnosis established    2. Pulmonary Embolism    - risk factor is suspected malignancy  - Lovenox for now   - can transition to NOAC at discharge    3.  Pleural Effusion    - suspect malignant  - chest tube placed in ER  - await cytology    Lydia Jones MD

## 2020-12-24 NOTE — ED NOTES
Patient given a beverage. No other needs at this time. Will continue to monitor.      Анна Alonzo RN  12/24/20 89 Eryn Caraballo RN  12/24/20 5477

## 2020-12-24 NOTE — ED NOTES
Radiologist at bedside discussing placement of chest tube with patient. Verbal order received by Dr Ariel Lowry for morphine hold heparin till procedure over.      Marlys Ball RN  12/24/20 2458

## 2020-12-24 NOTE — ED NOTES
Patient resting more easy and appears to have a reduction in pain. He states it is still present but better.      Trey Vang RN  12/24/20 4013

## 2020-12-24 NOTE — ED PROVIDER NOTES
[address] ED  EMERGENCY DEPARTMENT ENCOUNTER        Pt Name: Hilary Scanlon  MRN: [id number]  [place] 1965  Date of evaluation: 12/24/2020  Provider: Dionne Alvarenga PA-C  PCP: No primary care provider on file. I have seen and evaluated this patient with my supervising physician Addison Castillo MD.        [facility]       Chief Complaint   Patient presents with    Joint Swelling     Right arm swelling for 3 days. No known injury    Shortness of Breath     Right sided knot on ribs with shortness of breath for 13 months. HISTORY OF PRESENT ILLNESS   (Location/Symptom, Timing/Onset, Context/Setting, Quality, Duration, Modifying Factors, Severity)  Note limiting factors. Hilary Scanlon is a 60 y.o. male who presents here to the emergency department, the patient comes from the [facility], he has been there since February, he states that he has had a 40 pound weight loss, and over the last 3 days has noticed his right arm and the right side of his chest has been swelling. He also states that he has been short of breath for about 13 months or more. He has developed a cough since November 2019. He is not a smoker and never has been a smoker. He admits to lightheadedness when he stands, does admit to some back pain. Nursing Notes were all reviewed and agreed with or any disagreements were addressed  in the HPI. REVIEW OF SYSTEMS    (2-9 systems for level 4, 10 or more for level 5)     Review of Systems   Constitutional: Negative for chills, diaphoresis and fever. HENT: Negative for congestion, ear pain, rhinorrhea and sore throat. Eyes: Negative for pain and visual disturbance. Respiratory: Positive for cough and shortness of breath. Cardiovascular: Negative for chest pain, palpitations and leg swelling. Gastrointestinal: Negative for abdominal pain, constipation, diarrhea, nausea and vomiting. Genitourinary: Negative for decreased urine volume, dysuria, frequency and urgency. Musculoskeletal: Negative for back pain and neck pain. Skin: Positive for color change and rash. Negative for wound. Neurological: Positive for light-headedness. Negative for dizziness. PAST MEDICAL HISTORY     Past Medical History:   Diagnosis Date    Depression        SURGICAL HISTORY   No past surgical history on file. CURRENTMEDICATIONS       Previous Medications    No medications on file       ALLERGIES     Patient has no known allergies. FAMILYHISTORY     No family history on file. SOCIAL HISTORY       Social History     Socioeconomic History    Marital status:      Spouse name: Not on file    Number of children: Not on file    Years of education: Not on file    Highest education level: Not on file   Occupational History    Not on file   Social Needs    Financial resource strain: Not on file    Food insecurity     Worry: Not on file     Inability: Not on file    Transportation needs     Medical: Not on file     Non-medical: Not on file   Tobacco Use    Smoking status: Never Smoker    Smokeless tobacco: Never Used   Substance and Sexual Activity    Alcohol use:  Yes     Alcohol/week: 4.0 standard drinks     Types: 4 Cans of beer per week     Comment: occ    Drug use: Not Currently     Types: Marijuana    Sexual activity: Not on file   Lifestyle    Physical activity     Days per week: Not on file     Minutes per session: Not on file    Stress: Not on file   Relationships    Social connections     Talks on phone: Not on file     Gets together: Not on file     Attends Yarsanism service: Not on file     Active member of club or organization: Not on file     Attends meetings of clubs or organizations: Not on file     Relationship status: Not on file    Intimate partner violence     Fear of current or ex partner: Not on file     Emotionally abused: Not on file Physically abused: Not on file     Forced sexual activity: Not on file   Other Topics Concern    Not on file   Social History Narrative    Not on file       SCREENINGS    Hiram Coma Scale  Eye Opening: Spontaneous  Best Verbal Response: Oriented  Best Motor Response: Obeys commands  Hiram Coma Scale Score: 15        PHYSICAL EXAM    (up to 7 for level 4, 8 or more for level 5)     ED Triage Vitals [12/24/20 1221]   BP Temp Temp Source Pulse Resp SpO2 Height Weight   106/72 98.3 °F (36.8 °C) Oral 103 18 97 % 6' 1\" (1.854 m) 180 lb (81.6 kg)       Physical Exam  Vitals signs and nursing note reviewed. Constitutional:       Appearance: Normal appearance. He is well-developed. He is not diaphoretic. HENT:      Head: Normocephalic and atraumatic. Right Ear: External ear normal.      Left Ear: External ear normal.      Nose: Nose normal.   Eyes:      General:         Right eye: No discharge. Left eye: No discharge. Neck:      Musculoskeletal: Normal range of motion and neck supple. Cardiovascular:      Rate and Rhythm: Normal rate and regular rhythm. Heart sounds: Normal heart sounds. No murmur. No friction rub. No gallop. Pulmonary:      Effort: Pulmonary effort is normal. No respiratory distress. Breath sounds: Normal breath sounds. No stridor. No wheezing or rales. Chest:      Chest wall: No tenderness. Abdominal:      General: Bowel sounds are normal. There is no distension or abdominal bruit. Palpations: Abdomen is soft. Abdomen is not rigid. There is no mass or pulsatile mass. Tenderness: There is abdominal tenderness in the right upper quadrant. There is no guarding or rebound. Negative signs include Odell's sign and McBurney's sign. Musculoskeletal: Normal range of motion. Lymphadenopathy:      Upper Body:      Right upper body: Supraclavicular adenopathy and axillary adenopathy present. Skin:     General: Skin is warm and dry. Coloration: Skin is not pale. Neurological:      Mental Status: He is alert and oriented to person, place, and time. Psychiatric:         Behavior: Behavior normal.         DIAGNOSTIC RESULTS   LABS:    Labs Reviewed   CBC WITH AUTO DIFFERENTIAL - Abnormal; Notable for the following components:       Result Value    WBC 11.6 (*)     Hemoglobin 13.2 (*)     Hematocrit 40.1 (*)     MCV 77.4 (*)     MCH 25.5 (*)     RDW 17.2 (*)     Platelets 320 (*)     Neutrophils Absolute 9.0 (*)     All other components within normal limits    Narrative:     Performed at:  [facility]  [address],  [facility]   Phone [phone]   COMPREHENSIVE METABOLIC PANEL W/ REFLEX TO MG FOR LOW K - Abnormal; Notable for the following components:    Alb 3.2 (*)     Albumin/Globulin Ratio 1.0 (*)     ALT 9 (*)     AST 6 (*)     All other components within normal limits    Narrative:     Performed at:  [facility]  [address],  [facility]   Phone [phone]   CULTURE, BLOOD 1   CULTURE, BLOOD 2   CULTURE, BODY FLUID   LIPASE    Narrative:     Performed at:  [facility]  [address],  [facility]   Phone [phone]   APTT    Narrative:     Performed at:  [facility]  [address],  [facility]   Phone [phone]   URINE RT REFLEX TO CULTURE   CBC   APTT   APTT   APTT   GLUCOSE, BODY FLUID   PROTEIN, BODY FLUID   CYTOLOGY, NON-GYN   BODY FLUID CRYSTAL   BODY FLUID CELL COUNT WITH DIFFERENTIAL   COVID-19       All other labs were within normal range or not returned as of this dictation. EKG: All EKG's are interpreted by the Emergency Department Physician who either signs orCo-signs this chart in the absence of a cardiologist.  Please see their note for interpretation of EKG.       RADIOLOGY: pleural thickening on the right, suspicious for pleural implants, and a large   loculated pleural effusion on the right. Metastatic adenopathy seen in the right axillary region, and supraclavicular   region. Abdomen pelvis: There is widespread infiltrative nodular soft tissue density throughout the   abdomen and pelvis, compatible with sequelae of peritoneal carcinomatosis. There is abnormal adenopathy in the gastrohepatic ligament, and   retroperitoneum along with mild splenomegaly      Ill-defined hypodense nodule in the liver and spleen, possibly metastatic foci      Results discussed with Stewart Munoz by Selma Isbell. Bernice Monet MD at 2:23 pm on   12/24/2020         US THORACENTESIS Which side should the procedure be performed?  Radiologist Recommendation    (Results Pending)     Ct Abdomen Pelvis W Iv Contrast Additional Contrast? None    Result Date: 12/24/2020 EXAMINATION: CT OF THE ABDOMEN AND PELVIS WITH CONTRAST; CTA OF THE CHEST 12/24/2020 1:23 pm TECHNIQUE: CT of the abdomen and pelvis was performed with the administration of intravenous contrast. Multiplanar reformatted images are provided for review. Dose modulation, iterative reconstruction, and/or weight based adjustment of the mA/kV was utilized to reduce the radiation dose to as low as reasonably achievable.; CTA of the chest was performed after the administration of intravenous contrast.  Multiplanar reformatted images are provided for review. MIP images are provided for review. Dose modulation, iterative reconstruction, and/or weight based adjustment of the mA/kV was utilized to reduce the radiation dose to as low as reasonably achievable. COMPARISON: None HISTORY: ORDERING SYSTEM PROVIDED HISTORY: 40lb weight loss and swelling to right side TECHNOLOGIST PROVIDED HISTORY: If patient is on cardiac monitor and/or pulse ox, they may be taken off cardiac monitor and pulse ox, left on O2 if currently on. All monitors reattached when patient returns to room. Additional Contrast?->None Reason for exam:->40lb weight loss and swelling to right side Reason for Exam:  abd pain, chest pain, sob, extremity swelling Acuity: Acute Type of Exam: Initial; ORDERING SYSTEM PROVIDED HISTORY: 40lb weight loss and swelling to right side TECHNOLOGIST PROVIDED HISTORY: Reason for exam:->40lb weight loss and swelling to right side Reason for Exam: chest pain, sob, extremity swelling Acuity: Acute Type of Exam: Initial FINDINGS: Chest: Mediastinum: Thyroid gland appears normal.  There is abnormal supraclavicular adenopathy bilaterally. There is abnormal mediastinal adenopathy. Most notably, the abnormal mediastinal adenopathy encases and narrows the right mainstem bronchus as well as the bronchi to the right upper, right middle lobe and right lower lobe.   Index campos conglomerate in the right paratracheal region measures 2.4 cm x 2. 0 cm. There is poor visualization of the distal airways on the right. No intimal flap seen in aorta. No embolus is seen in the central pulmonary artery. However small pulmonary embolus seen in the peripheral branches of the left lower lobe pulmonary artery. Lungs/pleura: Scattered areas of bronchial wall thickening are seen. Mild septal thickening is seen in the aerated portion of the left lung. There is marked abnormal appearance to the right hemithorax. There is a hypodense collection centrally within the right hemithorax measuring 17.1 cm x 8.5 cm., likely loculated pleural effusion. There is diffuse circumferential pleural thickening within the right hemithorax. There is collapse and consolidation seen in the right upper, right middle, and right lower lobe medially in the right hemithorax. . Soft tissues/Bones: Scattered small axillary nodes are seen on the right. There is abnormal cortical thickening of the proximal right humerus. Index node and right axillary region measures 2.8 x 2.9 cm Abdomen/Pelvis: Organs: Spleen is mildly enlarged. No perisplenic fluid. A hypodense nodule is seen in the spleen measuring 1.3 cm. No intrahepatic ductal dilatation. There is trace perihepatic fluid. There is mild periportal edema. Gallbladder is surgically absent. .  Ill-defined hypodense nodule seen posteriorly in the right hepatic lobe measuring 2.7 cm Right adrenal gland is normal.  Left adrenal gland is normal No hydronephrosis on the right. No hydronephrosis on the left GI/Bowel: No significant small or large bowel distention noted to suggest bowel obstruction. A few colonic diverticula are seen. There is widespread abnormal diffuse infiltrative nodularity throughout the peritoneum, greatest anteriorly, and inferiorly in the pelvis, compatible with sequelae from peritoneal carcinomatosis. Mildly enlarged lymph nodes are seen gastrohepatic ligament. Index campos conglomerate measures 3.1 cm x 2.9 cm. Pelvis: No free fluid in pelvis. No pelvic adenopathy. Peritoneum/Retroperitoneum: Atherosclerotic change seen in aorta. Scattered small retroperitoneal nodes are seen. Index node in the left periaortic region measures 1.4 cm x 1.3 cm. Bones/Soft Tissues: Spurring is seen in the spine. Spurring is seen in the hips. Chest: Small pulmonary emboli left lower lobe Marked abnormal mediastinal adenopathy, with compression and obscuration of the airways on the right. There is collapse and consolidation seen of the right upper, right middle lobe, and right lower lobe. There is abnormal pleural thickening on the right, suspicious for pleural implants, and a large loculated pleural effusion on the right. Metastatic adenopathy seen in the right axillary region, and supraclavicular region. Abdomen pelvis: There is widespread infiltrative nodular soft tissue density throughout the abdomen and pelvis, compatible with sequelae of peritoneal carcinomatosis. There is abnormal adenopathy in the gastrohepatic ligament, and retroperitoneum along with mild splenomegaly Ill-defined hypodense nodule in the liver and spleen, possibly metastatic foci Results discussed with Stewart Munoz by Selma Isbell.  Bernice Monet MD at 2:23 pm on 12/24/2020     Ct Chest Pulmonary Embolism W Contrast    Result Date: 12/24/2020 Pelvis: No free fluid in pelvis. No pelvic adenopathy. Peritoneum/Retroperitoneum: Atherosclerotic change seen in aorta. Scattered small retroperitoneal nodes are seen. Index node in the left periaortic region measures 1.4 cm x 1.3 cm. Bones/Soft Tissues: Spurring is seen in the spine. Spurring is seen in the hips. Chest: Small pulmonary emboli left lower lobe Marked abnormal mediastinal adenopathy, with compression and obscuration of the airways on the right. There is collapse and consolidation seen of the right upper, right middle lobe, and right lower lobe. There is abnormal pleural thickening on the right, suspicious for pleural implants, and a large loculated pleural effusion on the right. Metastatic adenopathy seen in the right axillary region, and supraclavicular region. Abdomen pelvis: There is widespread infiltrative nodular soft tissue density throughout the abdomen and pelvis, compatible with sequelae of peritoneal carcinomatosis. There is abnormal adenopathy in the gastrohepatic ligament, and retroperitoneum along with mild splenomegaly Ill-defined hypodense nodule in the liver and spleen, possibly metastatic foci Results discussed with Yana Pride by [facility] Jannette. Catarino Linder MD at 2:23 pm on 12/24/2020         PROCEDURES   Unless otherwise noted below, none     Procedures    CRITICAL CARE TIME     Critical Care  There was a high probability of life-threatening clinical deterioration in the patient's condition requiring my urgent intervention. Total critical care time with the patient was 40 minutes excluding separately reportable procedures.   Critical care required due to patients malignant effusion, postprocedure pain, significant compression of his right lung and tachycardia and pulmonary embolus      CONSULTS:  IP CONSULT TO HOSPITALIST  IP CONSULT TO ONCOLOGY  IP CONSULT TO PULMONOLOGY  IP CONSULT TO HEM/ONC  IP CONSULT TO PULMONOLOGY  IP CONSULT TO PHARMACY EMERGENCY DEPARTMENT COURSE and DIFFERENTIAL DIAGNOSIS/MDM:   Vitals:    Vitals:    12/24/20 1221 12/24/20 1357 12/24/20 1454 12/24/20 1610   BP: 106/72 107/76 102/88 117/71   Pulse: 103 96 96 102   Resp: 18 18 15 28   Temp: 98.3 °F (36.8 °C)      TempSrc: Oral      SpO2: 97% 98% 97% 97%   Weight: 180 lb (81.6 kg)      Height: 6' 1\" (1.854 m)          Patient was given thefollowing medications:  Medications   0.9 % sodium chloride infusion ( Intravenous Stopped 12/24/20 1453)   heparin (porcine) injection 6,530 Units (has no administration in time range)   heparin (porcine) injection 3,260 Units (has no administration in time range)   heparin 25,000 units in dextrose 5% 250 mL infusion (18 Units/kg/hr × 81.6 kg Intravenous New Bag 12/24/20 1645)   morphine sulfate (PF) injection 4 mg (4 mg Intravenous Given 12/24/20 1509)   ondansetron (ZOFRAN) injection 4 mg (has no administration in time range)   acetaminophen (TYLENOL) tablet 650 mg (has no administration in time range)   morphine sulfate (PF) injection 4 mg (4 mg Intravenous Given 12/24/20 1604)   ondansetron (ZOFRAN) injection 4 mg (has no administration in time range)   iopamidol (ISOVUE-370) 76 % injection 75 mL (75 mLs Intravenous Given 12/24/20 1338)   heparin (porcine) injection 6,530 Units (6,530 Units Intravenous Given 12/24/20 1645)   ondansetron (ZOFRAN) 4 MG/2ML injection (4 mg  Given 12/24/20 1509)           I consulted with pulmonology, IR, radiology, oncology, and the hospitalist.  The team agrees that he can stay here at [facility], he does not have a great vessel compression to suggest the need for emergent radiation therapy, the differential diagnosis does include various types of malignancies including small and large cell cancers. While he was still here in the emergency department IR drained off 800 cc of fluid from his thorax, he experienced a significant amount of postprocedure pain. While tachycardic the rest of his vital signs are stable, there is no evidence of right heart strain from PE. FINAL IMPRESSION      1. Malignant pleural effusion    2. Malignant neoplasm of lung, unspecified laterality, unspecified part of lung ([address]    3. Single subsegmental pulmonary embolism without acute cor pulmonale (HCC)    4. Axillary lymphadenopathy          DISPOSITION/PLAN   DISPOSITION Admitted 12/24/2020 04:31:27 PM      PATIENT REFERREDTO:  No follow-up provider specified.     DISCHARGE MEDICATIONS:  New Prescriptions    No medications on file       DISCONTINUED MEDICATIONS:  Discontinued Medications    No medications on file              (Please note that portions ofthis note were completed with a voice recognition program.  Efforts were made to edit the dictations but occasionally words are mis-transcribed.)    Eb Boyer PA-C (electronically signed)             Eb Boyer PA-C  12/24/20 8925

## 2020-12-24 NOTE — ED NOTES
Verbal order to hold Heparin before procedure of IR chest tube placement per Otoniel SCHROEDER. Transport here to take patient to CT. Spoke with Sabra Nolen RN in interventional radiology. He will obtain consent.      Dayanna Negerte RN  12/24/20 6080

## 2020-12-24 NOTE — ED NOTES
1626 - called pulm so they are aware of pt   Re: PE, metastatic cancer, pleural effusion chest tube placed in ER  1645 - Dr Nathaly Sanchez called back and wanted to speak with ALEXANDRE Rico  12/24/20 1651

## 2020-12-24 NOTE — PLAN OF CARE
Patient from half-way for weight loss   CT scan concerning for malignancy  PE  Right loculated effusion, chest tube placed by IR    PCU  Heparin gtt  Pulm cs  Onc cs

## 2020-12-24 NOTE — ED NOTES
1447 - PS to Dr Shaylee Mahoney at [facility]  Re: consult on admitted patient  12 - Dr Shaylee Mahoney called back to speak with PA Leata Gilford Lovas  12/24/20 2667

## 2020-12-24 NOTE — ED NOTES
Patient returned from IR with Angelia Rocha. Procedure for chest tube was not done after radiologist discussed care. Patient did get 800 ml of fluid drained from right lung. Patient was unable to tolerate anymore of the procedure. Patient is in significant amount of pain and rolling around the bed to try to get comfortable. Verbal order obtained from Ethan SCHROEDER for 4 mg of morphine.      Mandi Watson RN  12/24/20 5289

## 2020-12-24 NOTE — ED NOTES
Dr Nickolas Campos and Arleen SCHROEDER at bedside discussing plan of care with patient.      Kvng Zapata RN  12/24/20 1218

## 2020-12-25 LAB
A/G RATIO: 0.7 (ref 1.1–2.2)
ALBUMIN SERPL-MCNC: 2.5 G/DL (ref 3.4–5)
ALP BLD-CCNC: 85 U/L (ref 40–129)
ALT SERPL-CCNC: 13 U/L (ref 10–40)
ANION GAP SERPL CALCULATED.3IONS-SCNC: 13 MMOL/L (ref 3–16)
APTT: 37.4 SEC (ref 24.2–36.2)
APTT: 53 SEC (ref 24.2–36.2)
APTT: 58 SEC (ref 24.2–36.2)
AST SERPL-CCNC: 9 U/L (ref 15–37)
BASOPHILS ABSOLUTE: 0.1 K/UL (ref 0–0.2)
BASOPHILS RELATIVE PERCENT: 0.8 %
BILIRUB SERPL-MCNC: <0.2 MG/DL (ref 0–1)
BUN BLDV-MCNC: 12 MG/DL (ref 7–20)
CALCIUM SERPL-MCNC: 8.5 MG/DL (ref 8.3–10.6)
CHLORIDE BLD-SCNC: 100 MMOL/L (ref 99–110)
CO2: 20 MMOL/L (ref 21–32)
CREAT SERPL-MCNC: 0.8 MG/DL (ref 0.9–1.3)
EOSINOPHILS ABSOLUTE: 0.2 K/UL (ref 0–0.6)
EOSINOPHILS RELATIVE PERCENT: 1.1 %
FLUID TYPE: NORMAL
GFR AFRICAN AMERICAN: >60
GFR NON-AFRICAN AMERICAN: >60
GLOBULIN: 3.4 G/DL
GLUCOSE BLD-MCNC: 87 MG/DL (ref 70–99)
GLUCOSE, FLUID: 12 MG/DL
HCT VFR BLD CALC: 40.1 % (ref 40.5–52.5)
HEMOGLOBIN: 12.9 G/DL (ref 13.5–17.5)
LYMPHOCYTES ABSOLUTE: 1.4 K/UL (ref 1–5.1)
LYMPHOCYTES RELATIVE PERCENT: 9.7 %
MCH RBC QN AUTO: 25.7 PG (ref 26–34)
MCHC RBC AUTO-ENTMCNC: 32.2 G/DL (ref 31–36)
MCV RBC AUTO: 80 FL (ref 80–100)
MONOCYTES ABSOLUTE: 1.2 K/UL (ref 0–1.3)
MONOCYTES RELATIVE PERCENT: 8.4 %
NEUTROPHILS ABSOLUTE: 11.7 K/UL (ref 1.7–7.7)
NEUTROPHILS RELATIVE PERCENT: 80 %
PDW BLD-RTO: 17 % (ref 12.4–15.4)
PLATELET # BLD: 409 K/UL (ref 135–450)
PMV BLD AUTO: 6.7 FL (ref 5–10.5)
POTASSIUM REFLEX MAGNESIUM: 4.6 MMOL/L (ref 3.5–5.1)
PROTEIN FLUID: 4.2 G/DL
RBC # BLD: 5.02 M/UL (ref 4.2–5.9)
SODIUM BLD-SCNC: 133 MMOL/L (ref 136–145)
TOTAL PROTEIN: 5.9 G/DL (ref 6.4–8.2)
WBC # BLD: 14.6 K/UL (ref 4–11)

## 2020-12-25 PROCEDURE — 36415 COLL VENOUS BLD VENIPUNCTURE: CPT

## 2020-12-25 PROCEDURE — 6370000000 HC RX 637 (ALT 250 FOR IP): Performed by: INTERNAL MEDICINE

## 2020-12-25 PROCEDURE — 85730 THROMBOPLASTIN TIME PARTIAL: CPT

## 2020-12-25 PROCEDURE — 99255 IP/OBS CONSLTJ NEW/EST HI 80: CPT | Performed by: INTERNAL MEDICINE

## 2020-12-25 PROCEDURE — 2580000003 HC RX 258: Performed by: PHYSICIAN ASSISTANT

## 2020-12-25 PROCEDURE — 6360000002 HC RX W HCPCS: Performed by: INTERNAL MEDICINE

## 2020-12-25 PROCEDURE — 80053 COMPREHEN METABOLIC PANEL: CPT

## 2020-12-25 PROCEDURE — 2060000000 HC ICU INTERMEDIATE R&B

## 2020-12-25 PROCEDURE — 6360000002 HC RX W HCPCS: Performed by: PHYSICIAN ASSISTANT

## 2020-12-25 PROCEDURE — 85025 COMPLETE CBC W/AUTO DIFF WBC: CPT

## 2020-12-25 RX ORDER — OXYCODONE AND ACETAMINOPHEN 7.5; 325 MG/1; MG/1
1 TABLET ORAL EVERY 4 HOURS PRN
Status: DISCONTINUED | OUTPATIENT
Start: 2020-12-25 | End: 2020-12-31 | Stop reason: HOSPADM

## 2020-12-25 RX ORDER — HEPARIN SODIUM 1000 [USP'U]/ML
40 INJECTION, SOLUTION INTRAVENOUS; SUBCUTANEOUS ONCE
Status: COMPLETED | OUTPATIENT
Start: 2020-12-25 | End: 2020-12-25

## 2020-12-25 RX ORDER — HEPARIN SODIUM 10000 [USP'U]/100ML
17.9 INJECTION, SOLUTION INTRAVENOUS CONTINUOUS
Status: DISCONTINUED | OUTPATIENT
Start: 2020-12-25 | End: 2020-12-26

## 2020-12-25 RX ORDER — DEXAMETHASONE SODIUM PHOSPHATE 4 MG/ML
4 INJECTION, SOLUTION INTRA-ARTICULAR; INTRALESIONAL; INTRAMUSCULAR; INTRAVENOUS; SOFT TISSUE EVERY 6 HOURS
Status: DISCONTINUED | OUTPATIENT
Start: 2020-12-25 | End: 2020-12-31 | Stop reason: HOSPADM

## 2020-12-25 RX ADMIN — Medication 10 ML: at 05:05

## 2020-12-25 RX ADMIN — MORPHINE SULFATE 2 MG: 2 INJECTION, SOLUTION INTRAMUSCULAR; INTRAVENOUS at 05:05

## 2020-12-25 RX ADMIN — OXYCODONE HYDROCHLORIDE AND ACETAMINOPHEN 1 TABLET: 7.5; 325 TABLET ORAL at 11:40

## 2020-12-25 RX ADMIN — MORPHINE SULFATE 2 MG: 2 INJECTION, SOLUTION INTRAMUSCULAR; INTRAVENOUS at 01:32

## 2020-12-25 RX ADMIN — DEXAMETHASONE SODIUM PHOSPHATE 4 MG: 4 INJECTION, SOLUTION INTRAMUSCULAR; INTRAVENOUS at 15:48

## 2020-12-25 RX ADMIN — DEXAMETHASONE SODIUM PHOSPHATE 4 MG: 4 INJECTION, SOLUTION INTRAMUSCULAR; INTRAVENOUS at 20:43

## 2020-12-25 RX ADMIN — Medication 10 ML: at 01:32

## 2020-12-25 RX ADMIN — OXYCODONE HYDROCHLORIDE AND ACETAMINOPHEN 1 TABLET: 7.5; 325 TABLET ORAL at 20:43

## 2020-12-25 RX ADMIN — HEPARIN SODIUM 3260 UNITS: 1000 INJECTION, SOLUTION INTRAVENOUS; SUBCUTANEOUS at 07:53

## 2020-12-25 RX ADMIN — OXYCODONE HYDROCHLORIDE AND ACETAMINOPHEN 1 TABLET: 7.5; 325 TABLET ORAL at 16:10

## 2020-12-25 RX ADMIN — HEPARIN SODIUM AND DEXTROSE 16.3 ML/HR: 10000; 5 INJECTION INTRAVENOUS at 07:52

## 2020-12-25 RX ADMIN — Medication 10 ML: at 20:42

## 2020-12-25 RX ADMIN — MORPHINE SULFATE 2 MG: 2 INJECTION, SOLUTION INTRAMUSCULAR; INTRAVENOUS at 10:08

## 2020-12-25 ASSESSMENT — PAIN DESCRIPTION - LOCATION: LOCATION: BACK;CHEST

## 2020-12-25 ASSESSMENT — PAIN DESCRIPTION - FREQUENCY: FREQUENCY: CONTINUOUS

## 2020-12-25 ASSESSMENT — PAIN SCALES - GENERAL
PAINLEVEL_OUTOF10: 8
PAINLEVEL_OUTOF10: 7

## 2020-12-25 ASSESSMENT — PAIN DESCRIPTION - PAIN TYPE: TYPE: ACUTE PAIN

## 2020-12-25 NOTE — PROGRESS NOTES
Pulmonology consult called to Dr. Howard Benton on call. Spoke with Chaim Luong @[phone].     Catherine Lynn PCA/MT  12/25/2020

## 2020-12-25 NOTE — H&P
Hospital Medicine History & Physical      PCP: No primary care provider on file. Date of Admission: 12/24/2020    Date of Service: Pt seen/examined on *12/25/2020 and Admitted to Inpatient    Chief Complaint:  SOB, weight loss. History Of Present Illness: The patient is a 60 y.o. male who denies any prior cancer history \"not even a hint of it\" per patient, who presented from [facility] with 40lb weight loss, R arm and R chest swelling. SOB for several months and likely longer. He also reports chronic and worsening right chest pain - feels like a broken rib per patient. This has been ongoing for months. Pt reports gradually worsening symptoms over month. Reports he lost over 40lbs of weight in the past year - he attributes it to custodial food. Past Medical History:        Diagnosis Date    Depression        Past Surgical History:    History reviewed. No pertinent surgical history. Medications Prior to Admission:    Prior to Admission medications    Not on File       Allergies:  Patient has no known allergies. Social History:  The patient currently from custodial. He has a sister and mother that he communicates with. He is estranged from his other siblings. TOBACCO:   reports that he has never smoked. He has never used smokeless tobacco.  ETOH:   reports previous alcohol use of about 77.0 standard drinks of alcohol per week. Family History:  Reviewed in detail and negative for DM, Early CAD, Cancer, CVA. Positive as follows:    No family history on file. REVIEW OF SYSTEMS:   As noted in the HPI. All other systems reviewed and negative.     PHYSICAL EXAM:    /74   Pulse 93   Temp 96.8 °F (36 °C) (Oral)   Resp 18   Ht 6' 1\" (1.854 m)   Wt 176 lb (79.8 kg)   SpO2 96%   BMI 23.22 kg/m² General appearance: No apparent distress appears stated age and cooperative. HEENT Normal cephalic, atraumatic without obvious deformity. Pupils equal, round, and reactive to light. Extra ocular muscles intact. Conjunctivae/corneas clear. Neck: Supple, No jugular venous distention/bruits. Trachea midline without thyromegaly or adenopathy with full range of motion. Lungs: rhonchi , diminished sounds - worse on the right. Heart: Regular rate and rhythm with Normal S1/S2 without murmurs, rubs or gallops, point of maximum impulse non-displaced  Abdomen: Soft, non-tender or non-distended without rigidity or guarding and positive bowel sounds all four quadrants. Extremities: RUE edema. Skin: Skin color, texture, turgor normal.  No rashes or lesions. Neurologic: Alert and oriented X 3, neurovascularly intact with sensory/motor intact upper extremities/lower extremities, bilaterally. Cranial nerves: II-XII intact, grossly non-focal.  Mental status: Alert, oriented, thought content appropriate. Capillary Refill: Acceptable  < 3 seconds  Peripheral Pulses: +3 Easily felt, not easily obliterated with pressure      CBC   Recent Labs     12/24/20  1230 12/25/20  0527   WBC 11.6* 14.6*   HGB 13.2* 12.9*   HCT 40.1* 40.1*   * 409      RENAL  Recent Labs     12/24/20  1230 12/25/20  0527    133*   K 4.6 4.6    100   CO2 29 20*   BUN 15 12   CREATININE 1.0 0.8*     LFT'S  Recent Labs     12/24/20  1230 12/25/20  0527   AST 6* 9*   ALT 9* 13   BILITOT <0.2 <0.2   ALKPHOS 83 85     COAG  No results for input(s): INR in the last 72 hours. CARDIAC ENZYMES  No results for input(s): CKTOTAL, CKMB, CKMBINDEX, TROPONINI in the last 72 hours.     U/A:    Lab Results   Component Value Date    COLORU Yellow 12/24/2020    CLARITYU Clear 12/24/2020    SPECGRAV 1.025 12/24/2020    LEUKOCYTESUR Negative 12/24/2020    BLOODU Negative 12/24/2020    GLUCOSEU Negative 12/24/2020 ABG  No results found for: JOA3AFI, BEART, K6XWGYIT, PHART, THGBART, ZXK9EGB, PO2ART, HFG5USL        Active Hospital Problems    Diagnosis Date Noted    Acute pulmonary embolism ([facility] [address] [I26.99] 12/24/2020         PHYSICIANS CERTIFICATION:    I certify that Dalton Brush is expected to be hospitalized for more than 2 midnights based on the following assessment and plan:      ASSESSMENT/PLAN:      Abnormal CT Chest and Abdomen  Highly concerning for wide spread malignancy with carcinomatosis. HemOnc and Pulm involved. S/p thoracentesis - R loculated pleural effusion - 800cc of fluid taken - studies pending. Will need bronch vs CT biopsy. Acute PE  Likely secondary to malignancy. On heparin gtt. Pain - he likely has widely metastatic Ca. Multi focal pain - mostly in R chest and chest wall. I suspect bone involvement with cancer. He is on IV morphine and add PO percocet today. DVT Prophylaxis: on heparin gtt  Diet: DIET GENERAL;  Code Status: Full Code      Dispo - cc. Hallie Charles MD    Thank you No primary care provider on file. for the opportunity to be involved in this patient's care. If you have any questions or concerns please feel free to contact me at [phone].

## 2020-12-25 NOTE — PROGRESS NOTES
Patient admitted to room 322 from ED. Patient oriented to room, call light, bed rails, phone, lights and bathroom. Patient instructed about the schedule of the day including: vital sign frequency, lab draws, possible tests, frequency of MD and staff rounds, daily weights, I &O's and prescribed diet. Bed alarm not in use. Patient a/o x4, calls out appropriately, low fall risk. Telemetry box in place, patient aware of placement and reason. Bed locked, in lowest position, side rails up 2/4, call light within reach. Recliner Assessment  Patient is able to demonstrate the ability to move from a reclining position to an upright position within the recliner.

## 2020-12-25 NOTE — PROGRESS NOTES
4 Eyes Skin Assessment     The patient is being assess for Admission    I agree that 2 RN's have performed a thorough Head to Toe Skin Assessment on the patient. ALL assessment sites listed below have been assessed. Areas assessed by both nurses:   [x]   Head, Face, and Ears   [x]   Shoulders, Back, and Chest, Abdomen  [x]   Arms, Elbows, and Hands   [x]   Coccyx, Sacrum, and Ischium  [x]   Legs, Feet, and Heels        Dry skin throughout with multiple cracks on bilateral feet. Co-signer eSignature: Electronically signed by Gatito Velasco RN on 12/25/20 at 11:30 AM EST    Does the Patient have Skin Breakdown?   No          Eduardo Prevention initiated:  No   Wound Care Orders initiated:  No      WOC nurse consulted for Pressure Injury (Stage 3,4, Unstageable, DTI, NWPT, Complex wounds)and New or Established Ostomies:  No      Primary Nurse eSignature: Electronically signed by Jarett Schaefer RN on 12/25/20 at 8:28 AM EST

## 2020-12-25 NOTE — PROGRESS NOTES
AM assessment completed, see flowsheet. Patient resting in bed at this time. All needs met. Respirations shallow, easy and even at rest, SOB on exertion. Bed in lowest position and locked, SR up x2. Call light and bedside table within reach.

## 2020-12-25 NOTE — CONSULTS
Patient is being seen at the request of Micaela Mathews for a consultation for pleural effusion    HISTORY OF PRESENT ILLNESS:   60years old presented with 4 days of R arm swelling. Mild to moderate. Not sure what makes better or worse. Associated with shortness of breath. R sided chest pain and SOB for a year. Lost 45 pounds since August 2020. No fever. No NVD. No night sweats. Never smoked tobacco. [place]. No IBD or O2. CT chest showed PE with mediastinal adenopathy as well as right pleural effusion, loculated with right-sided collapse. PAST MEDICAL HISTORY:  Past Medical History:   Diagnosis Date    Depression      PAST SURGICAL HISTORY:  History reviewed. No pertinent surgical history. FAMILY HISTORY:  No lung cancer       SOCIAL HISTORY:   reports that he has never smoked. He has never used smokeless tobacco.    Scheduled Meds:   heparin (porcine)  40 Units/kg Intravenous Once    sodium chloride flush  10 mL Intravenous 2 times per day    [START ON 12/26/2020] influenza virus vaccine  0.5 mL Intramuscular Prior to discharge     Continuous Infusions:   heparin (PORCINE) Infusion      sodium chloride Stopped (12/24/20 1453)     PRN Meds:  heparin (porcine), heparin (porcine), acetaminophen, sodium chloride flush, promethazine **OR** ondansetron, polyethylene glycol, acetaminophen **OR** acetaminophen, morphine    ALLERGIES:  Patient has No Known Allergies.     REVIEW OF SYSTEMS:  Constitutional: + Weight loss  HENT: Negative for sore throat  Eyes: Negative for redness   Respiratory: + Dyspnea, cough  Cardiovascular: Negative for chest pain  Gastrointestinal: Negative for vomiting, diarrhea   Genitourinary: Negative for hematuria   Musculoskeletal: + arthralgias   Skin: Negative for rash  Neurological: Negative for syncope  Hematological: Negative for adenopathy  Psychiatric/Behavorial: Negative for anxiety    PHYSICAL EXAM: Blood pressure 107/73, pulse 87, temperature 97.9 °F (36.6 °C), temperature source Oral, resp. rate 18, height 6' 1\" (1.854 m), weight 176 lb (79.8 kg), SpO2 96 %.' on RA  Gen: + Distress. Ill-appearing  Eyes: PERRL. No sclera icterus. No conjunctival injection. ENT: No discharge. Pharynx clear. Neck: Trachea midline. No obvious mass. Resp: + Accessory muscle use. No crackles. No wheezes. No rhonchi. R dullness on percussion. CV: Regular rate. Regular rhythm. No murmur or rub. RUE edema. GI: Non-tender. Non-distended. No hernia. Skin: Warm and dry. No nodule on exposed extremities. Lymph: No cervical LAD. R supraclavicular LAD. Right axillary lymphadenopathy   M/S: No cyanosis. No joint deformity. No clubbing. Neuro: Awake. Alert. Moves all four extremities. Psych: Oriented x 3. No anxiety. LABS:  CBC:   Recent Labs     12/24/20  1230 12/25/20 0527   WBC 11.6* 14.6*   HGB 13.2* 12.9*   HCT 40.1* 40.1*   MCV 77.4* 80.0   * 409     BMP:   Recent Labs     12/24/20  1230 12/25/20  0527    133*   K 4.6 4.6    100   CO2 29 20*   BUN 15 12   CREATININE 1.0 0.8*     LIVER PROFILE:   Recent Labs     12/24/20  1230 12/25/20  0527   AST 6* 9*   ALT 9* 13   LIPASE 18.0  --    BILITOT <0.2 <0.2   ALKPHOS 83 85     PT/INR: No results for input(s): PROTIME, INR in the last 72 hours. APTT:   Recent Labs     12/24/20  1230 12/24/20  2253 12/25/20 0527   APTT 33.0 54.8* 37.4*     UA:  Recent Labs     12/24/20 2050   COLORU Yellow   PHUR 5.0   CLARITYU Clear   SPECGRAV 1.025   LEUKOCYTESUR Negative   UROBILINOGEN 0.2   BILIRUBINUR Negative   BLOODU Negative   GLUCOSEU Negative     No results for input(s): PHART, USP5YGE, PO2ART in the last 72 hours.     CT chest 12/24 imaging was reviewed by me and showed   Small pulmonary emboli left lower lobe Marked abnormal mediastinal adenopathy, with compression and obscuration of the airways on the right.  There is collapse and consolidation seen of the right upper, right middle lobe, and right lower lobe.  There is abnormal pleural thickening on the right, suspicious for pleural implants, and a large loculated pleural effusion on the right. Metastatic adenopathy seen in the right axillary region, and supraclavicular   region. ASSESSMENT:  · Acute PE  · SVC syndrome  · Mediastinal/right hilar mass compressing right-sided airways with right lung collapse, pleural implants, axillary/supraclavicular lymphadenopathy, diffuse infiltrative nodularities throughout the peritoneum. Lymphoma versus metastatic bronchogenic carcinoma  · Right-sided pleural effusion-likely malignant effusion. Post thoracentesis 12/24 800 mL bloody  · Weight loss    PLAN:  Supplemental oxygen to maintain SaO2 >92%; wean as tolerated  · Follow-up pleural fluid cytology  · Right axillary mass will have the highest diagnostic/staging yield.   We will consult IR  · Heparin drip  · Decadron 4 mg IV every 6 hours  · Discussed with oncology

## 2020-12-25 NOTE — PROGRESS NOTES
Pharmacy - RE:  High-dose Heparin drip  Current rate = 14.7 ml/h  (1470 units/h)  aPTT drawn @ 2253 = 54.8 sec. Goal aPTT = 49 - 76 sec. Per protocol, continue same rate and obtain another aPTT 12/25 with AM lab draws.

## 2020-12-25 NOTE — ED NOTES
Patient taken to PCU via wheelchair. Report given to RN, verbalizes understanding. No questions at this time.      Minnie Judge RN  12/24/20 1928

## 2020-12-25 NOTE — PROGRESS NOTES
Pharmacy - RE:  High-dose Heparin drip  Current rate = 16.3 ml/h  (1630 units/h)  aPTT drawn 1352@  = 58 sec. Goal aPTT = 49 - 76 sec. Per protocol, continue with r16.3 ml/hr (1630 units/jr).  Next Ptt at 2000 today  Jesse QUINTANA 12/25/20202:58 PM  .  '  .

## 2020-12-26 LAB
A/G RATIO: 0.6 (ref 1.1–2.2)
ALBUMIN SERPL-MCNC: 2.3 G/DL (ref 3.4–5)
ALP BLD-CCNC: 113 U/L (ref 40–129)
ALT SERPL-CCNC: 10 U/L (ref 10–40)
ANION GAP SERPL CALCULATED.3IONS-SCNC: 12 MMOL/L (ref 3–16)
ANION GAP SERPL CALCULATED.3IONS-SCNC: 8 MMOL/L (ref 3–16)
ANION GAP SERPL CALCULATED.3IONS-SCNC: 9 MMOL/L (ref 3–16)
APTT: 36.7 SEC (ref 24.2–36.2)
AST SERPL-CCNC: 10 U/L (ref 15–37)
BASOPHILS ABSOLUTE: 0.1 K/UL (ref 0–0.2)
BASOPHILS RELATIVE PERCENT: 0.4 %
BILIRUB SERPL-MCNC: 0.3 MG/DL (ref 0–1)
BUN BLDV-MCNC: 13 MG/DL (ref 7–20)
BUN BLDV-MCNC: 14 MG/DL (ref 7–20)
BUN BLDV-MCNC: 15 MG/DL (ref 7–20)
CALCIUM SERPL-MCNC: 8.5 MG/DL (ref 8.3–10.6)
CALCIUM SERPL-MCNC: 8.6 MG/DL (ref 8.3–10.6)
CALCIUM SERPL-MCNC: 9 MG/DL (ref 8.3–10.6)
CHLORIDE BLD-SCNC: 92 MMOL/L (ref 99–110)
CHLORIDE BLD-SCNC: 94 MMOL/L (ref 99–110)
CHLORIDE BLD-SCNC: 94 MMOL/L (ref 99–110)
CO2: 19 MMOL/L (ref 21–32)
CO2: 26 MMOL/L (ref 21–32)
CO2: 26 MMOL/L (ref 21–32)
CREAT SERPL-MCNC: 0.6 MG/DL (ref 0.9–1.3)
CREAT SERPL-MCNC: 0.6 MG/DL (ref 0.9–1.3)
CREAT SERPL-MCNC: <0.5 MG/DL (ref 0.9–1.3)
EOSINOPHILS ABSOLUTE: 0 K/UL (ref 0–0.6)
EOSINOPHILS RELATIVE PERCENT: 0 %
GFR AFRICAN AMERICAN: >60
GFR NON-AFRICAN AMERICAN: >60
GLOBULIN: 4.1 G/DL
GLUCOSE BLD-MCNC: 114 MG/DL (ref 70–99)
GLUCOSE BLD-MCNC: 128 MG/DL (ref 70–99)
GLUCOSE BLD-MCNC: 143 MG/DL (ref 70–99)
HCT VFR BLD CALC: 41.1 % (ref 40.5–52.5)
HEMOGLOBIN: 13.4 G/DL (ref 13.5–17.5)
LYMPHOCYTES ABSOLUTE: 0.7 K/UL (ref 1–5.1)
LYMPHOCYTES RELATIVE PERCENT: 4.3 %
MCH RBC QN AUTO: 26 PG (ref 26–34)
MCHC RBC AUTO-ENTMCNC: 32.5 G/DL (ref 31–36)
MCV RBC AUTO: 79.8 FL (ref 80–100)
MONOCYTES ABSOLUTE: 0.6 K/UL (ref 0–1.3)
MONOCYTES RELATIVE PERCENT: 3.4 %
NEUTROPHILS ABSOLUTE: 15.3 K/UL (ref 1.7–7.7)
NEUTROPHILS RELATIVE PERCENT: 91.9 %
PDW BLD-RTO: 16.9 % (ref 12.4–15.4)
PLATELET # BLD: 363 K/UL (ref 135–450)
PMV BLD AUTO: 7 FL (ref 5–10.5)
POTASSIUM REFLEX MAGNESIUM: 5.3 MMOL/L (ref 3.5–5.1)
POTASSIUM SERPL-SCNC: 4.5 MMOL/L (ref 3.5–5.1)
POTASSIUM SERPL-SCNC: 4.6 MMOL/L (ref 3.5–5.1)
RBC # BLD: 5.15 M/UL (ref 4.2–5.9)
SODIUM BLD-SCNC: 125 MMOL/L (ref 136–145)
SODIUM BLD-SCNC: 127 MMOL/L (ref 136–145)
SODIUM BLD-SCNC: 128 MMOL/L (ref 136–145)
TOTAL PROTEIN: 6.4 G/DL (ref 6.4–8.2)
WBC # BLD: 16.7 K/UL (ref 4–11)

## 2020-12-26 PROCEDURE — 99253 IP/OBS CNSLTJ NEW/EST LOW 45: CPT | Performed by: SURGERY

## 2020-12-26 PROCEDURE — G0008 ADMIN INFLUENZA VIRUS VAC: HCPCS | Performed by: INTERNAL MEDICINE

## 2020-12-26 PROCEDURE — 2580000003 HC RX 258: Performed by: INTERNAL MEDICINE

## 2020-12-26 PROCEDURE — 90686 IIV4 VACC NO PRSV 0.5 ML IM: CPT | Performed by: INTERNAL MEDICINE

## 2020-12-26 PROCEDURE — 85025 COMPLETE CBC W/AUTO DIFF WBC: CPT

## 2020-12-26 PROCEDURE — 2580000003 HC RX 258: Performed by: PHYSICIAN ASSISTANT

## 2020-12-26 PROCEDURE — 6360000002 HC RX W HCPCS: Performed by: INTERNAL MEDICINE

## 2020-12-26 PROCEDURE — 85730 THROMBOPLASTIN TIME PARTIAL: CPT

## 2020-12-26 PROCEDURE — 80053 COMPREHEN METABOLIC PANEL: CPT

## 2020-12-26 PROCEDURE — 6360000002 HC RX W HCPCS: Performed by: PHYSICIAN ASSISTANT

## 2020-12-26 PROCEDURE — 36415 COLL VENOUS BLD VENIPUNCTURE: CPT

## 2020-12-26 PROCEDURE — 6370000000 HC RX 637 (ALT 250 FOR IP): Performed by: INTERNAL MEDICINE

## 2020-12-26 PROCEDURE — 99233 SBSQ HOSP IP/OBS HIGH 50: CPT | Performed by: INTERNAL MEDICINE

## 2020-12-26 PROCEDURE — 2060000000 HC ICU INTERMEDIATE R&B

## 2020-12-26 RX ORDER — HEPARIN SODIUM 1000 [USP'U]/ML
40 INJECTION, SOLUTION INTRAVENOUS; SUBCUTANEOUS ONCE
Status: DISCONTINUED | OUTPATIENT
Start: 2020-12-26 | End: 2020-12-26

## 2020-12-26 RX ORDER — OXYCODONE HCL 10 MG/1
10 TABLET, FILM COATED, EXTENDED RELEASE ORAL EVERY 12 HOURS SCHEDULED
Status: DISCONTINUED | OUTPATIENT
Start: 2020-12-26 | End: 2020-12-31 | Stop reason: HOSPADM

## 2020-12-26 RX ADMIN — OXYCODONE HYDROCHLORIDE AND ACETAMINOPHEN 1 TABLET: 7.5; 325 TABLET ORAL at 01:07

## 2020-12-26 RX ADMIN — Medication 10 ML: at 08:44

## 2020-12-26 RX ADMIN — DEXAMETHASONE SODIUM PHOSPHATE 4 MG: 4 INJECTION, SOLUTION INTRAMUSCULAR; INTRAVENOUS at 14:20

## 2020-12-26 RX ADMIN — DEXAMETHASONE SODIUM PHOSPHATE 4 MG: 4 INJECTION, SOLUTION INTRAMUSCULAR; INTRAVENOUS at 20:46

## 2020-12-26 RX ADMIN — ENOXAPARIN SODIUM 80 MG: 80 INJECTION SUBCUTANEOUS at 20:47

## 2020-12-26 RX ADMIN — HEPARIN SODIUM AND DEXTROSE 16.3 ML/HR: 10000; 5 INJECTION INTRAVENOUS at 00:03

## 2020-12-26 RX ADMIN — DEXAMETHASONE SODIUM PHOSPHATE 4 MG: 4 INJECTION, SOLUTION INTRAMUSCULAR; INTRAVENOUS at 01:08

## 2020-12-26 RX ADMIN — INFLUENZA A VIRUS A/VICTORIA/2454/2019 IVR-207 (H1N1) ANTIGEN (PROPIOLACTONE INACTIVATED), INFLUENZA A VIRUS A/HONG KONG/2671/2019 IVR-208 (H3N2) ANTIGEN (PROPIOLACTONE INACTIVATED), INFLUENZA B VIRUS B/VICTORIA/705/2018 BVR-11 ANTIGEN (PROPIOLACTONE INACTIVATED), INFLUENZA B VIRUS B/PHUKET/3073/2013 BVR-1B ANTIGEN (PROPIOLACTONE INACTIVATED) 0.5 ML: 15; 15; 15; 15 INJECTION, SUSPENSION INTRAMUSCULAR at 10:26

## 2020-12-26 RX ADMIN — OXYCODONE HYDROCHLORIDE 10 MG: 10 TABLET, FILM COATED, EXTENDED RELEASE ORAL at 20:47

## 2020-12-26 RX ADMIN — ENOXAPARIN SODIUM 80 MG: 80 INJECTION SUBCUTANEOUS at 14:22

## 2020-12-26 RX ADMIN — OXYCODONE HYDROCHLORIDE AND ACETAMINOPHEN 1 TABLET: 7.5; 325 TABLET ORAL at 06:18

## 2020-12-26 RX ADMIN — SODIUM CHLORIDE: 234 INJECTION INTRAMUSCULAR; INTRAVENOUS; SUBCUTANEOUS at 14:27

## 2020-12-26 RX ADMIN — OXYCODONE HYDROCHLORIDE AND ACETAMINOPHEN 1 TABLET: 7.5; 325 TABLET ORAL at 14:20

## 2020-12-26 RX ADMIN — Medication 10 ML: at 20:46

## 2020-12-26 RX ADMIN — OXYCODONE HYDROCHLORIDE 10 MG: 10 TABLET, FILM COATED, EXTENDED RELEASE ORAL at 10:25

## 2020-12-26 RX ADMIN — DEXAMETHASONE SODIUM PHOSPHATE 4 MG: 4 INJECTION, SOLUTION INTRAMUSCULAR; INTRAVENOUS at 08:44

## 2020-12-26 ASSESSMENT — PAIN DESCRIPTION - LOCATION
LOCATION: BACK;CHEST
LOCATION: RIB CAGE

## 2020-12-26 ASSESSMENT — PAIN SCALES - GENERAL
PAINLEVEL_OUTOF10: 7

## 2020-12-26 ASSESSMENT — PAIN - FUNCTIONAL ASSESSMENT: PAIN_FUNCTIONAL_ASSESSMENT: ACTIVITIES ARE NOT PREVENTED

## 2020-12-26 ASSESSMENT — PAIN DESCRIPTION - PAIN TYPE: TYPE: ACUTE PAIN

## 2020-12-26 ASSESSMENT — PAIN DESCRIPTION - FREQUENCY: FREQUENCY: CONTINUOUS

## 2020-12-26 NOTE — PROGRESS NOTES
ONCOLOGY FOLLOW-UP:         PROBLEM LIST:       Patient Active Problem List   Diagnosis Code    Acute pulmonary embolism (HCC) I26.99    Axillary lymphadenopathy R59.0    Mediastinal mass J98.59    SVC syndrome I87.1       INTERVAL HISTORY:       Pt remains admitted. Still c/o R arm pain. No fever. REVIEW OF SYSTEMS:       10 point ROS completed. Pertinent positives in HPI, otherwise negative.      PHYSICAL EXAM:       Vitals:    12/26/20 0100   BP: 108/75   Pulse: 91   Resp: 18   Temp: 98.3 °F (36.8 °C)   SpO2: 93%       General appearance: alert and cooperative  Head: Normocephalic, without obvious abnormality, atraumatic  Neck: No palpable lymphadenopathy in supraclavicular or cervical chains  Lungs: Clear to auscultation bilaterally, no audible rales, wheezes or crackles  Heart: Regular rate and rhythm, S1, S2 normal  Abdomen: Soft, non-tender; bowel sounds normal; no masses,  no organomegaly  Extremities: without cyanosis, clubbing, edema or asymmetry  Skin: No jaundice, purpura or petechiae      LABS:     Lab Results   Component Value Date    WBC 16.7 (H) 12/26/2020    HGB 13.4 (L) 12/26/2020    HCT 41.1 12/26/2020    MCV 79.8 (L) 12/26/2020     12/26/2020       Lab Results   Component Value Date    GLUCOSE 114 (H) 12/26/2020    BUN 13 12/26/2020    CREATININE 0.6 (L) 12/26/2020    K 5.3 (H) 12/26/2020       Lab Results   Component Value Date    ALKPHOS 113 12/26/2020    ALT 10 12/26/2020    AST 10 (L) 12/26/2020    BILITOT 0.3 12/26/2020    PROT 6.4 12/26/2020       IMAGING:     Ct Abdomen Pelvis W Iv Contrast Additional Contrast? None  Result Date: 12/24/2020 Chest: Small pulmonary emboli left lower lobe Marked abnormal mediastinal adenopathy, with compression and obscuration of the airways on the right. There is collapse and consolidation seen of the right upper, right middle lobe, and right lower lobe. There is abnormal pleural thickening on the right, suspicious for pleural implants, and a large loculated pleural effusion on the right. Metastatic adenopathy seen in the right axillary region, and supraclavicular region. Abdomen pelvis: There is widespread infiltrative nodular soft tissue density throughout the abdomen and pelvis, compatible with sequelae of peritoneal carcinomatosis. There is abnormal adenopathy in the gastrohepatic ligament, and retroperitoneum along with mild splenomegaly Ill-defined hypodense nodule in the liver and spleen, possibly metastatic foci Results discussed with Anne Hoffman by Raysa Whitfield. Constance Young MD at 2:23 pm on 12/24/2020     Ct Chest Pulmonary Embolism W Contrast  Result Date: 12/24/2020  Chest: Small pulmonary emboli left lower lobe Marked abnormal mediastinal adenopathy, with compression and obscuration of the airways on the right. There is collapse and consolidation seen of the right upper, right middle lobe, and right lower lobe. There is abnormal pleural thickening on the right, suspicious for pleural implants, and a large loculated pleural effusion on the right. Metastatic adenopathy seen in the right axillary region, and supraclavicular region. Abdomen pelvis: There is widespread infiltrative nodular soft tissue density throughout the abdomen and pelvis, compatible with sequelae of peritoneal carcinomatosis. There is abnormal adenopathy in the gastrohepatic ligament, and retroperitoneum along with mild splenomegaly Ill-defined hypodense nodule in the liver and spleen, possibly metastatic foci Results discussed with Anne Hoffman by Raysa Whitfield.  Constance Young MD at 2:23 pm on 12/24/2020       ASSESSMENT/PLAN:

## 2020-12-26 NOTE — PROGRESS NOTES
Pulmonary Progress Note    CC: Pleural effusion    Subjective:   Appears comfortable  Room air    IV line:      Intake/Output Summary (Last 24 hours) at 12/26/2020 0720  Last data filed at 12/26/2020 0549  Gross per 24 hour   Intake 660 ml   Output 2400 ml   Net -1740 ml       Exam:   /75   Pulse 91   Temp 98.3 °F (36.8 °C) (Oral)   Resp 18   Ht 6' 1\" (1.854 m)   Wt 183 lb (83 kg)   SpO2 93%   BMI 24.14 kg/m²  on room air  Gen: + Distress. Ill-appearing  Eyes: PERRL. No sclera icterus. No conjunctival injection. ENT: No discharge. Pharynx clear. Neck: Trachea midline. No obvious mass. Resp:  + Accessory muscle use. No crackles. No wheezes. No rhonchi. R dullness on percussion. CV: Regular rate. Regular rhythm. No murmur or rub. RUE edema. GI: Non-tender. Non-distended. No hernia. Skin: Warm and dry. No nodule on exposed extremities. Lymph: No cervical LAD. R supraclavicular LAD. Right axillary lymphadenopathy   M/S: No cyanosis. No joint deformity. No clubbing. Neuro: Awake. Alert. Moves all four extremities. Psych: Oriented x 3. No anxiety.      Scheduled Meds:   dexamethasone  4 mg Intravenous Q6H    sodium chloride flush  10 mL Intravenous 2 times per day    influenza virus vaccine  0.5 mL Intramuscular Prior to discharge     Continuous Infusions:   heparin (PORCINE) Infusion 16.3 mL/hr (12/26/20 0003)     PRN Meds:  oxyCODONE-acetaminophen, heparin (porcine), heparin (porcine), acetaminophen, sodium chloride flush, promethazine **OR** ondansetron, polyethylene glycol, acetaminophen **OR** acetaminophen, morphine    Labs:  CBC:   Recent Labs     12/24/20  1230 12/25/20  0527 12/26/20  0516   WBC 11.6* 14.6* 16.7*   HGB 13.2* 12.9* 13.4*   HCT 40.1* 40.1* 41.1   MCV 77.4* 80.0 79.8*   * 409 363     BMP:   Recent Labs     12/24/20  1230 12/25/20  0527 12/26/20  0516    133* 125*   K 4.6 4.6 5.3*    100 94*   CO2 29 20* 19*   BUN 15 12 13 CREATININE 1.0 0.8* 0.6*     LIVER PROFILE:   Recent Labs     12/24/20  1230 12/25/20  0527 12/26/20  0516   AST 6* 9* 10*   ALT 9* 13 10   LIPASE 18.0  --   --    BILITOT <0.2 <0.2 0.3   ALKPHOS 83 85 113     PT/INR: No results for input(s): PROTIME, INR in the last 72 hours. APTT:   Recent Labs     12/25/20  0527 12/25/20  1352 12/25/20 2003   APTT 37.4* 58.0* 53.0*     UA:  Recent Labs     12/24/20 2050   COLORU Yellow   PHUR 5.0   CLARITYU Clear   SPECGRAV 1.025   LEUKOCYTESUR Negative   UROBILINOGEN 0.2   BILIRUBINUR Negative   BLOODU Negative   GLUCOSEU Negative     No results for input(s): PHART, APD8KSA, PO2ART in the last 72 hours. Cultures:   12/24 BC NGTD  12/24 pleural fluid no organisms    Films:  CT chest 12/24 imaging was reviewed by me and showed   Small pulmonary emboli left lower lobe   Marked abnormal mediastinal adenopathy, with compression and obscuration of the airways on the right.  There is collapse and consolidation seen of the right upper, right middle lobe, and right lower lobe.  There is abnormal pleural thickening on the right, suspicious for pleural implants, and a large loculated pleural effusion on the right. Metastatic adenopathy seen in the right axillary region, and supraclavicular   region.            ASSESSMENT:  · Acute PE  · SVC syndrome  · Mediastinal/right hilar mass compressing right-sided airways with right lung collapse, pleural implants, axillary/supraclavicular lymphadenopathy, diffuse infiltrative nodularities throughout the peritoneum. Lymphoma versus metastatic bronchogenic carcinoma  · Right-sided pleural effusion-likely malignant effusion.  Post thoracentesis 12/24 800 mL bloody  · Weight loss     PLAN:  · Supplemental oxygen to maintain SaO2 >92%; wean as tolerated  · Follow-up pleural fluid cytology  · Right axillary mass will have the highest diagnostic/staging yield- Dr. Silva Rothman evaluated patient   · Heparin drip  · Decadron 4 mg IV every 6 hours · Discussed with oncology

## 2020-12-26 NOTE — CARE COORDINATION
Case Management Assessment  Initial Evaluation      Patient Name: Governor Thomas  YOB: 1965  Diagnosis: Acute pulmonary embolism, unspecified pulmonary embolism type, unspecified whether acute cor pulmonale present [facility]) [I26.99]  Date / Time: 12/24/2020 12:13 PM    Admission status/Date:12/24/2020 inpt  Chart Reviewed: Yes      Patient Interviewed: Yes   Family Interviewed:  No      Hospitalization in the last 30 days:  No      Health Care Decision Maker :     (CM - must 1st enter selection under Navigator - emergency contact- Deric Relationship and pick relationship)   Who do you trust or have selected to make healthcare decisions for you      Met with: pt  Interview conducted  (bedside/phone): bedside    Current PCP: none--from [address] required for SNF : Y, N          3 night stay required - Y, N    ADLS  Support Systems/Care Needs:    Transportation: police    Meal Preparation: longterm    Housing  Living Arrangements: [facility]  Steps: 0  Intent for return to present living arrangements: Yes  Identified Issues:     Home Care Information  Active with [address] : No Agency:(Services)     Passport/Waiver : No  :                      Phone Number:    Passport/Waiver Services:           Durable Medical Equiptment   DME Provider: n/a  Equipment: n/a  Walker___Cane___RTS___ BSC___Shower Chair___Hospital Bed___W/C____Other________  02 at ____Liter(s)---wears(frequency)_______ HHN ___ CPAP___ BiPap___   N/A__X__      Home O2 Use :  No    If No for home O2---if presently on O2 during hospitalization:  No  if yes CM to follow for potential DC O2 need  Informed of need for care provider to bring portable home O2 tank on day of discharge for nursing to connect prior to leaving:   Not Indicated  Verbalized agreement/Understanding:   Not Indicated Community Service Affiliation  Dialysis:  No    · Agency:  · Location:  · Dialysis Schedule:  · Phone:   · Fax: Other Community Services: (ex:PT/OT,Mental Health,Wound Clinic, Cardio/Pul [address])    DISCHARGE PLAN: Explained Case Management role/services. Reviewed chart. Role of discharge planner explained and patient verbalized understanding. Pt states that he is form the [facility]. He is hoping to be furloughed to go home. Pt states that he has served 130 days and has 45 days left. The pt had multiple complaints while CM was in the room. Pt does NOT have a deputy at bedside, however, a deputy calls every 6 hours on check on pt per charge RNMonico. It is believed that he plan is for pt to return to intermediate at discharge. Pt is a new diagnoses of acute PE. Pt may need Xarelto or Eliquis.  Pt also currently has Right sided arms swelling, which is one of the things that brought him into the hospital.

## 2020-12-26 NOTE — FLOWSHEET NOTE
12/26/20 0832   Vital Signs   Temp 97.3 °F (36.3 °C)   Temp Source Oral   Pulse 97   Heart Rate Source Monitor   Resp 18   /79   BP Location Left upper arm   Patient Position Semi fowlers   Level of Consciousness Alert (0)   MEWS Score 1   Pain Assessment   Pain Assessment 0-10   Pain Level 7   Pain Type Acute pain   Pain Location Back; Chest   Pain Orientation Right   Pain Descriptors Aching;Discomfort   Pain Frequency Continuous   Pain Onset On-going   Functional Pain Assessment Activities are not prevented   Oxygen Therapy   SpO2 96 %   O2 Device None (Room air)   Patient Observation   Observations watching TV   Pt A/O VSS Safety precautions remain in place, call light in reach, shift assessment completed see flow sheet.

## 2020-12-26 NOTE — PROGRESS NOTES
Pt resting in bed watching TV. Shift assessment complete and all meds given per MAR. Beverage and snack offered to pt. Pt stated his pain medication was due at 20:10. Nurse explained to pt that the medication was available but not scheduled. Pt informed to use call light when in pain and when next round of pain medication is available if pain persists. Informed pt nurse must assess first before giving pain medication. Pt verbalized understanding. Bed in lowest position and call light within reach. Pt denies any further needs at this time. Will continue to monitor and assess.

## 2020-12-26 NOTE — PROGRESS NOTES
Pharmacy - RE:  High-dose Heparin drip  Current rate = 16.3 ml/h  (1630 units/h)  aPTT drawn @ 2003  = 53 sec. Goal aPTT = 49 - 76 sec. Per protocol, continue with same rate of 16.3 ml/hr (1630 units/jr). Per protocol, may switch to daily aPTT's. Next aPTT due 12/26 with AM lab draws. Ronald Zimmer

## 2020-12-26 NOTE — PROGRESS NOTES
Hospitalist Progress Note      PCP: No primary care provider on file. Date of Admission: 12/24/2020    Chief Complaint: R arm edema, R axillary nodule, weight loss. Pt from assisted. Subjective:     Pain control better today. R arm edema persisting. Medications:  Reviewed    Infusion Medications    IV infusion builder       Scheduled Medications    oxyCODONE  10 mg Oral 2 times per day    enoxaparin  1 mg/kg Subcutaneous BID    dexamethasone  4 mg Intravenous Q6H    sodium chloride flush  10 mL Intravenous 2 times per day     PRN Meds: oxyCODONE-acetaminophen, acetaminophen, sodium chloride flush, promethazine **OR** ondansetron, polyethylene glycol, acetaminophen **OR** acetaminophen, morphine      Intake/Output Summary (Last 24 hours) at 12/26/2020 1324  Last data filed at 12/26/2020 0549  Gross per 24 hour   Intake 240 ml   Output 2100 ml   Net -1860 ml       Physical Exam Performed:    /79   Pulse 97   Temp 97.3 °F (36.3 °C) (Oral)   Resp 18   Ht 6' 1\" (1.854 m)   Wt 183 lb (83 kg)   SpO2 96%   BMI 24.14 kg/m²     General appearance: No apparent distress appears stated age and cooperative. HEENT Normal cephalic, atraumatic without obvious deformity. Pupils equal, round, and reactive to light. Extra ocular muscles intact. Conjunctivae/corneas clear. Neck: Supple, No jugular venous distention/bruits. Trachea midline without thyromegaly or adenopathy with full range of motion. Lungs: rhonchi , diminished sounds - worse on the right. Heart: Regular rate and rhythm with Normal S1/S2 without murmurs, rubs or gallops, point of maximum impulse non-displaced  Abdomen: Soft, non-tender or non-distended without rigidity or guarding and positive bowel sounds all four quadrants. Extremities: RUE edema. Skin: Skin color, texture, turgor normal.  No rashes or lesions. Neurologic: Alert and oriented X 3, neurovascularly intact with sensory/motor intact upper extremities/lower extremities, bilaterally. Cranial nerves: II-XII intact, grossly non-focal.  Mental status: Alert, oriented, thought content appropriate. Capillary Refill: Acceptable  < 3 seconds  Peripheral Pulses: +3 Easily felt, not easily obliterated with pressure         Labs:   Recent Labs     12/24/20  1230 12/25/20  0527 12/26/20  0516   WBC 11.6* 14.6* 16.7*   HGB 13.2* 12.9* 13.4*   HCT 40.1* 40.1* 41.1   * 409 363     Recent Labs     12/24/20  1230 12/25/20  0527 12/26/20  0516    133* 125*   K 4.6 4.6 5.3*    100 94*   CO2 29 20* 19*   BUN 15 12 13   CREATININE 1.0 0.8* 0.6*   CALCIUM 9.0 8.5 9.0     Recent Labs     12/24/20  1230 12/25/20  0527 12/26/20  0516   AST 6* 9* 10*   ALT 9* 13 10   BILITOT <0.2 <0.2 0.3   ALKPHOS 83 85 113     No results for input(s): INR in the last 72 hours. No results for input(s): Corey Ripper in the last 72 hours. Urinalysis:      Lab Results   Component Value Date    NITRU Negative 12/24/2020    BLOODU Negative 12/24/2020    SPECGRAV 1.025 12/24/2020    GLUCOSEU Negative 12/24/2020       Radiology:  XR CHEST PORTABLE   Final Result   No definite pneumothorax on the right status post thoracentesis      Complete opacification of the right hemithorax, due to pleuroparenchymal   disease         CT ABDOMEN PELVIS W IV CONTRAST Additional Contrast? None   Final Result   Chest:      Small pulmonary emboli left lower lobe      Marked abnormal mediastinal adenopathy, with compression and obscuration of   the airways on the right. There is collapse and consolidation seen of the   right upper, right middle lobe, and right lower lobe. There is abnormal   pleural thickening on the right, suspicious for pleural implants, and a large   loculated pleural effusion on the right.       Metastatic adenopathy seen in the right axillary region, and supraclavicular region. Abdomen pelvis: There is widespread infiltrative nodular soft tissue density throughout the   abdomen and pelvis, compatible with sequelae of peritoneal carcinomatosis. There is abnormal adenopathy in the gastrohepatic ligament, and   retroperitoneum along with mild splenomegaly      Ill-defined hypodense nodule in the liver and spleen, possibly metastatic foci      Results discussed with Darylene Boyer by Teto Saul MD at 2:23 pm on   12/24/2020         CT CHEST PULMONARY EMBOLISM W CONTRAST   Final Result   Chest:      Small pulmonary emboli left lower lobe      Marked abnormal mediastinal adenopathy, with compression and obscuration of   the airways on the right. There is collapse and consolidation seen of the   right upper, right middle lobe, and right lower lobe. There is abnormal   pleural thickening on the right, suspicious for pleural implants, and a large   loculated pleural effusion on the right. Metastatic adenopathy seen in the right axillary region, and supraclavicular   region. Abdomen pelvis: There is widespread infiltrative nodular soft tissue density throughout the   abdomen and pelvis, compatible with sequelae of peritoneal carcinomatosis. There is abnormal adenopathy in the gastrohepatic ligament, and   retroperitoneum along with mild splenomegaly      Ill-defined hypodense nodule in the liver and spleen, possibly metastatic foci      Results discussed with Darylene Boyer by Teto Holguin. Kisha Saul MD at 2:23 pm on   12/24/2020         US THORACENTESIS Which side should the procedure be performed?  Radiologist Recommendation    (Results Pending)           Assessment/Plan:    Active Hospital Problems    Diagnosis    Axillary lymphadenopathy [R59.0]    Mediastinal mass [J98.59]    SVC syndrome [I87.1]    Acute pulmonary embolism (HCC) [I26.99]       Abnormal CT Chest and Abdomen Highly concerning for wide spread malignancy with carcinomatosis - primary unknown - possibly lung. HemOnc and Pulm involved. S/p thoracentesis - R loculated pleural effusion - 800cc of fluid taken - studies pending. Plan for Ct biopsy Monday - the easiest will probably be R axillary large lymph node.         Acute PE  Likely secondary to malignancy. On heparin gtt - to lovenox - access problems. - plan NOAC on discharge         Pain - he likely has widely metastatic Ca. Multi focal pain - mostly in R chest and chest wall. I suspect bone involvement with cancer. He is on IV morphine and added PO percocet 12/25  Will add oxy ER and continue percocet for break through symptoms. Avoid IV opiate as able.               DVT Prophylaxis: on heparin gtt to lovenox.    Diet: DIET GENERAL;  Code Status: Full Code        Dispo - cc.          Shaun Salazar MD

## 2020-12-26 NOTE — CONSULTS
polyethylene glycol (GLYCOLAX) packet 17 g, 17 g, Oral, Daily PRN  acetaminophen (TYLENOL) tablet 650 mg, 650 mg, Oral, Q6H PRN **OR** acetaminophen (TYLENOL) suppository 650 mg, 650 mg, Rectal, Q6H PRN  morphine (PF) injection 2 mg, 2 mg, Intravenous, Q3H PRN  Prior to Admission medications    Not on File        Allergies:  Patient has no known allergies. Social History:   TOBACCO:   reports that he has never smoked. He has never used smokeless tobacco.  ETOH:   reports previous alcohol use of about 77.0 standard drinks of alcohol per week. DRUGS:   reports previous drug use. Frequency: 7.00 times per week. Drug: Marijuana. Family History:    No family history on file. REVIEW OF SYSTEMS:  CONSTITUTIONAL:  positive for  weight loss  HEENT:  negative  RESPIRATORY: Positive for shortness of breath  CARDIOVASCULAR:  negative  GASTROINTESTINAL:  negative  GENITOURINARY:  negative  HEMATOLOGIC/LYMPHATIC:  negative  NEUROLOGICAL:  Negative  * All other ROS reviewed and negative. PHYSICAL EXAM:  VITALS:  /79   Pulse 97   Temp 97.3 °F (36.3 °C) (Oral)   Resp 18   Ht 6' 1\" (1.854 m)   Wt 183 lb (83 kg)   SpO2 96%   BMI 24.14 kg/m²   24HR INTAKE/OUTPUT:    I/O last 3 completed shifts: In: 660 [P.O.:660]  Out: 2400 [Urine:2400]  No intake/output data recorded.     CONSTITUTIONAL:  alert, no apparent distress and normal weight  EYES:  PERRL, sclera clear  ENT:  Normocephalic,atraumatic, without obvious abnormality  NECK:  supple, symmetrical, trachea midline  LUNGS: Resp effort easy and unlabored, diminished breath sounds right side  CARDIOVASCULAR:  NO JVD, regular rate and rhythm and no murmur noted  ABDOMEN:  normal bowel sounds, soft, non-distended, non-tender  MUSCULOSKELETAL: No clubbing or cyanosis, 0+ pitting edema lower extremities  NEUROLOGIC:  Mental Status Exam:  Level of Alertness:   awake  PSYCHIATRIC:   person, place, time  SKIN: Warm and dry LYMPHATIC: No palpable supraclavicular adenopathy. On the right side, he has prominent axillary adenopathy    DATA:    CBC:   Recent Labs     12/24/20  1230 12/25/20  0527 12/26/20  0516   WBC 11.6* 14.6* 16.7*   HGB 13.2* 12.9* 13.4*   HCT 40.1* 40.1* 41.1   * 409 363     BMP:    Recent Labs     12/24/20  1230 12/25/20  0527 12/26/20  0516    133* 125*   K 4.6 4.6 5.3*    100 94*   CO2 29 20* 19*   BUN 15 12 13   CREATININE 1.0 0.8* 0.6*   GLUCOSE 88 87 114*     Hepatic:   Recent Labs     12/24/20  1230 12/25/20  0527 12/26/20  0516   AST 6* 9* 10*   ALT 9* 13 10   BILITOT <0.2 <0.2 0.3   ALKPHOS 83 85 113     Mag:    No results for input(s): MG in the last 72 hours. Phos:   No results for input(s): PHOS in the last 72 hours. INR: No results for input(s): INR in the last 72 hours. Radiology Review: Images personally reviewed by me. Chest:       Small pulmonary emboli left lower lobe       Marked abnormal mediastinal adenopathy, with compression and obscuration of   the airways on the right.  There is collapse and consolidation seen of the   right upper, right middle lobe, and right lower lobe.  There is abnormal   pleural thickening on the right, suspicious for pleural implants, and a large   loculated pleural effusion on the right.       Metastatic adenopathy seen in the right axillary region, and supraclavicular   region.       Abdomen pelvis:       There is widespread infiltrative nodular soft tissue density throughout the   abdomen and pelvis, compatible with sequelae of peritoneal carcinomatosis.    There is abnormal adenopathy in the gastrohepatic ligament, and   retroperitoneum along with mild splenomegaly       Ill-defined hypodense nodule in the liver and spleen, possibly metastatic foci           IMPRESSION/RECOMMENDATIONS: Right lung mass with likely diffuse metastatic disease. The pattern is more suggestive of carcinoma than lymphoma. Await cytology from his thoracentesis to see if a diagnosis can be made from this. If not, either IR or surgical biopsy of right axillary lymph node can be performed to obtain diagnosis.   We will follow    Electronically signed by Lidya Mitchell MD     [address] [facility]  [id number]

## 2020-12-26 NOTE — CONSULTS
[facility]  Consult Note           Reason for Consult:  Hyponatremia  Requesting Physician:  Dr. Hazel Cordoba    Chief Complaint:  Shortness of breath, weight loss  History Obtained From:  patient, electronic medical record    History of Present Ilness:    60year old male who presented with sob, weight loss. We have been asked to assist in further mgmt of his hyponatremia. Has had 40 pound weight loss over last few months and chronic sob over past year or so. Has been having right sided chest wall pain with sob. Denies any reduced intake, n/v/d, abdominal pain, abdominal pain, tremors, or confusion. Na 138 on admission, has been trending down to 125 with initial IVF's. Past Medical History:        Diagnosis Date    Depression        Past Surgical History:    History reviewed. No pertinent surgical history. Home Medications:    No current facility-administered medications on file prior to encounter. No current outpatient medications on file prior to encounter. Allergies:  Patient has no known allergies.     Social History:    Social History     Socioeconomic History    Marital status:      Spouse name: Not on file    Number of children: Not on file    Years of education: Not on file    Highest education level: Not on file   Occupational History    Not on file   Social Needs    Financial resource strain: Not on file    Food insecurity     Worry: Not on file     Inability: Not on file    Transportation needs     Medical: Not on file     Non-medical: Not on file   Tobacco Use    Smoking status: Never Smoker    Smokeless tobacco: Never Used   Substance and Sexual Activity    Alcohol use: Not Currently     Alcohol/week: 77.0 standard drinks     Types: 70 Cans of beer, 7 Shots of liquor per week     Comment: sober since 08-    Drug use: Not Currently     Frequency: 7.0 times per week     Types: Marijuana     Comment: clean since 08/07/2020  Sexual activity: Not on file   Lifestyle    Physical activity     Days per week: Not on file     Minutes per session: Not on file    Stress: Not on file   Relationships    Social connections     Talks on phone: Not on file     Gets together: Not on file     Attends Rastafarian service: Not on file     Active member of club or organization: Not on file     Attends meetings of clubs or organizations: Not on file     Relationship status: Not on file    Intimate partner violence     Fear of current or ex partner: Not on file     Emotionally abused: Not on file     Physically abused: Not on file     Forced sexual activity: Not on file   Other Topics Concern    Not on file   Social History Narrative    Not on file       Family History:   No history of kidney disease. Review of Systems:   Pertinent positives stated above in HPI. Remainder of 10 point review of systems were reviewed and were negative.     Physical exam:   Constitutional:  VITALS:  /74   Pulse 94   Temp 97.2 °F (36.2 °C) (Oral)   Resp 18   Ht 6' 1\" (1.854 m)   Wt 183 lb (83 kg)   SpO2 94%   BMI 24.14 kg/m²   Gen: alert, awake, nad  Skin: no rash, turgor wnl  Heent:  eomi, mmm  Neck: no bruits or jvd noted, thyroid normal  Cardiovascular:  S1, S2 without m/r/g  Respiratory: CTA B without w/r/r; respiratory effort normal  Abdomen:  +bs, soft, nt, nd, no hepatosplenomegaly  Ext: no lower extremity edema  Psychiatric: mood and affect appropriate; judgement and insight intact  Musculoskeletal:  Rom, muscular strength intact; digits, nails normal    Data/  CBC:   Lab Results   Component Value Date    WBC 16.7 12/26/2020    RBC 5.15 12/26/2020    HGB 13.4 12/26/2020    HCT 41.1 12/26/2020    MCV 79.8 12/26/2020    MCH 26.0 12/26/2020    MCHC 32.5 12/26/2020    RDW 16.9 12/26/2020     12/26/2020    MPV 7.0 12/26/2020     BMP:    Lab Results   Component Value Date     12/26/2020    K 5.3 12/26/2020    CL 94 12/26/2020 CO2 19 12/26/2020    BUN 13 12/26/2020    LABALBU 2.3 12/26/2020    CREATININE 0.6 12/26/2020    CALCIUM 9.0 12/26/2020    GFRAA >60 12/26/2020    LABGLOM >60 12/26/2020    GLUCOSE 114 12/26/2020         Assessment/    Hyponatremia - suspect SIADH state in setting of new cancer diagnosis & pain  - Data: Na trending down to 125 with initial IVF's    Acute PE   - On lovenox    Hyperkalemia  - K 5.3       Plan/    - Trial of 2% saline 60 ml/hour - goal correction 6-8 meq/24 hours  - Urine electrolytes, Urine osmolality  - BMP q3hinfd    Case d/w Dr. Freddy Boo      Thank you for the consultation. Please do not hesitate to call with questions.     [employer]

## 2020-12-26 NOTE — PROGRESS NOTES
Consult has been called to Dr. Monika Adams on 12/26/2020. Spoke with Elizabet.  8:56 AM    Tiesha Clark  12/26/2020

## 2020-12-26 NOTE — PROGRESS NOTES
Consult has been called to Dr. Lilly Lyle on 12/26/2020.  Spoke with Jose Maria. 9:57 AM    Alex Campos  12/26/2020

## 2020-12-26 NOTE — PROGRESS NOTES
Handoff report given to [facility], RN. Pt is stable at this time. Care transferred.

## 2020-12-26 NOTE — PROGRESS NOTES
High dose Heparin GTT:  APTT at 1030 was 36.7 seconds. Increase the infusion rate to 17. 9mL/hr and give a 3,300 unit IV Bolus dose of heparin. Recheck the aPTT at 1900.   Devang Grant PharmD 12/26/2020 12:51 PM

## 2020-12-26 NOTE — ED PROVIDER NOTES
I independently performed a history and physical on Mitali Leon. All diagnostic, treatment, and disposition decisions were made by myself in conjunction with the advanced practice provider. For further details of 4 [facility] emergency department encounter, please see the advance practice provider's documentation. In brief, this is a 60y.o. year old male, with   Chief Complaint   Patient presents with    Joint Swelling     Right arm swelling for 3 days. No known injury    Shortness of Breath     Right sided knot on ribs with shortness of breath for 13 months. Patient has had a 40 lb weight loss over the year. He has had increasing RUE and right chest wall edema over the last few days. He complains of some shortness of breath over the year as well. On exam, patient has diminished lung sounds on the right chest.  His RUE has chronic skin changes, and is swollen. He has palpable axillary lymphadenopathy. Patient's imaging shows lung cancer, malignant pleural effusion, and metastases. Patient informed of his new diagnoses. He needs to be admitted. IR was gracious and performed diagnostic and therapeutic thoracentesis. Patient is agreeable with admission.       Sheryle Carrion, MD  12/26/20 1706

## 2020-12-27 LAB
A/G RATIO: 0.8 (ref 1.1–2.2)
ALBUMIN SERPL-MCNC: 2.6 G/DL (ref 3.4–5)
ALP BLD-CCNC: 101 U/L (ref 40–129)
ALT SERPL-CCNC: 9 U/L (ref 10–40)
ANION GAP SERPL CALCULATED.3IONS-SCNC: 10 MMOL/L (ref 3–16)
ANION GAP SERPL CALCULATED.3IONS-SCNC: 10 MMOL/L (ref 3–16)
ANION GAP SERPL CALCULATED.3IONS-SCNC: 9 MMOL/L (ref 3–16)
ANION GAP SERPL CALCULATED.3IONS-SCNC: 9 MMOL/L (ref 3–16)
APTT: 33.8 SEC (ref 24.2–36.2)
AST SERPL-CCNC: 7 U/L (ref 15–37)
BASOPHILS ABSOLUTE: 0 K/UL (ref 0–0.2)
BASOPHILS RELATIVE PERCENT: 0.1 %
BILIRUB SERPL-MCNC: <0.2 MG/DL (ref 0–1)
BODY FLUID CULTURE, STERILE: NORMAL
BUN BLDV-MCNC: 12 MG/DL (ref 7–20)
BUN BLDV-MCNC: 13 MG/DL (ref 7–20)
BUN BLDV-MCNC: 13 MG/DL (ref 7–20)
BUN BLDV-MCNC: 14 MG/DL (ref 7–20)
CALCIUM SERPL-MCNC: 8.6 MG/DL (ref 8.3–10.6)
CALCIUM SERPL-MCNC: 8.8 MG/DL (ref 8.3–10.6)
CHLORIDE BLD-SCNC: 95 MMOL/L (ref 99–110)
CHLORIDE BLD-SCNC: 96 MMOL/L (ref 99–110)
CHLORIDE BLD-SCNC: 96 MMOL/L (ref 99–110)
CHLORIDE BLD-SCNC: 97 MMOL/L (ref 99–110)
CHLORIDE URINE RANDOM: 150 MMOL/L
CO2: 23 MMOL/L (ref 21–32)
CO2: 24 MMOL/L (ref 21–32)
CO2: 27 MMOL/L (ref 21–32)
CO2: 27 MMOL/L (ref 21–32)
CREAT SERPL-MCNC: 0.6 MG/DL (ref 0.9–1.3)
CREAT SERPL-MCNC: 0.6 MG/DL (ref 0.9–1.3)
CREAT SERPL-MCNC: <0.5 MG/DL (ref 0.9–1.3)
CREAT SERPL-MCNC: <0.5 MG/DL (ref 0.9–1.3)
EOSINOPHILS ABSOLUTE: 0 K/UL (ref 0–0.6)
EOSINOPHILS RELATIVE PERCENT: 0 %
GFR AFRICAN AMERICAN: >60
GFR NON-AFRICAN AMERICAN: >60
GLOBULIN: 3.4 G/DL
GLUCOSE BLD-MCNC: 111 MG/DL (ref 70–99)
GLUCOSE BLD-MCNC: 118 MG/DL (ref 70–99)
GLUCOSE BLD-MCNC: 119 MG/DL (ref 70–99)
GLUCOSE BLD-MCNC: 129 MG/DL (ref 70–99)
GRAM STAIN RESULT: NORMAL
HCT VFR BLD CALC: 37.1 % (ref 40.5–52.5)
HEMOGLOBIN: 12.2 G/DL (ref 13.5–17.5)
LYMPHOCYTES ABSOLUTE: 0.5 K/UL (ref 1–5.1)
LYMPHOCYTES RELATIVE PERCENT: 3.6 %
MCH RBC QN AUTO: 25.7 PG (ref 26–34)
MCHC RBC AUTO-ENTMCNC: 32.7 G/DL (ref 31–36)
MCV RBC AUTO: 78.5 FL (ref 80–100)
MONOCYTES ABSOLUTE: 0.7 K/UL (ref 0–1.3)
MONOCYTES RELATIVE PERCENT: 4.5 %
NEUTROPHILS ABSOLUTE: 13.5 K/UL (ref 1.7–7.7)
NEUTROPHILS RELATIVE PERCENT: 91.8 %
OSMOLALITY URINE: 874 MOSM/KG (ref 390–1070)
PDW BLD-RTO: 16.7 % (ref 12.4–15.4)
PLATELET # BLD: 413 K/UL (ref 135–450)
PMV BLD AUTO: 6.7 FL (ref 5–10.5)
POTASSIUM REFLEX MAGNESIUM: 4.3 MMOL/L (ref 3.5–5.1)
POTASSIUM SERPL-SCNC: 4.6 MMOL/L (ref 3.5–5.1)
POTASSIUM SERPL-SCNC: 4.7 MMOL/L (ref 3.5–5.1)
POTASSIUM SERPL-SCNC: 4.8 MMOL/L (ref 3.5–5.1)
POTASSIUM, UR: 69.3 MMOL/L
RBC # BLD: 4.73 M/UL (ref 4.2–5.9)
SODIUM BLD-SCNC: 129 MMOL/L (ref 136–145)
SODIUM BLD-SCNC: 130 MMOL/L (ref 136–145)
SODIUM BLD-SCNC: 131 MMOL/L (ref 136–145)
SODIUM BLD-SCNC: 133 MMOL/L (ref 136–145)
SODIUM URINE: 144 MMOL/L
TOTAL PROTEIN: 6 G/DL (ref 6.4–8.2)
WBC # BLD: 14.7 K/UL (ref 4–11)

## 2020-12-27 PROCEDURE — 85025 COMPLETE CBC W/AUTO DIFF WBC: CPT

## 2020-12-27 PROCEDURE — 82436 ASSAY OF URINE CHLORIDE: CPT

## 2020-12-27 PROCEDURE — 84300 ASSAY OF URINE SODIUM: CPT

## 2020-12-27 PROCEDURE — 36415 COLL VENOUS BLD VENIPUNCTURE: CPT

## 2020-12-27 PROCEDURE — 6360000002 HC RX W HCPCS: Performed by: INTERNAL MEDICINE

## 2020-12-27 PROCEDURE — 99233 SBSQ HOSP IP/OBS HIGH 50: CPT | Performed by: INTERNAL MEDICINE

## 2020-12-27 PROCEDURE — 85730 THROMBOPLASTIN TIME PARTIAL: CPT

## 2020-12-27 PROCEDURE — 2580000003 HC RX 258: Performed by: PHYSICIAN ASSISTANT

## 2020-12-27 PROCEDURE — 6370000000 HC RX 637 (ALT 250 FOR IP): Performed by: PHYSICIAN ASSISTANT

## 2020-12-27 PROCEDURE — 99232 SBSQ HOSP IP/OBS MODERATE 35: CPT | Performed by: SURGERY

## 2020-12-27 PROCEDURE — 2060000000 HC ICU INTERMEDIATE R&B

## 2020-12-27 PROCEDURE — 84133 ASSAY OF URINE POTASSIUM: CPT

## 2020-12-27 PROCEDURE — 6370000000 HC RX 637 (ALT 250 FOR IP): Performed by: INTERNAL MEDICINE

## 2020-12-27 PROCEDURE — 2580000003 HC RX 258: Performed by: INTERNAL MEDICINE

## 2020-12-27 PROCEDURE — 80048 BASIC METABOLIC PNL TOTAL CA: CPT

## 2020-12-27 PROCEDURE — 83935 ASSAY OF URINE OSMOLALITY: CPT

## 2020-12-27 PROCEDURE — 80053 COMPREHEN METABOLIC PANEL: CPT

## 2020-12-27 RX ORDER — SENNA AND DOCUSATE SODIUM 50; 8.6 MG/1; MG/1
2 TABLET, FILM COATED ORAL DAILY
Status: DISCONTINUED | OUTPATIENT
Start: 2020-12-27 | End: 2020-12-31 | Stop reason: HOSPADM

## 2020-12-27 RX ORDER — POLYETHYLENE GLYCOL 3350 17 G/17G
17 POWDER, FOR SOLUTION ORAL DAILY
Status: DISCONTINUED | OUTPATIENT
Start: 2020-12-27 | End: 2020-12-31 | Stop reason: HOSPADM

## 2020-12-27 RX ADMIN — ENOXAPARIN SODIUM 80 MG: 80 INJECTION SUBCUTANEOUS at 20:21

## 2020-12-27 RX ADMIN — OXYCODONE HYDROCHLORIDE AND ACETAMINOPHEN 1 TABLET: 7.5; 325 TABLET ORAL at 05:12

## 2020-12-27 RX ADMIN — ENOXAPARIN SODIUM 80 MG: 80 INJECTION SUBCUTANEOUS at 09:42

## 2020-12-27 RX ADMIN — OXYCODONE HYDROCHLORIDE AND ACETAMINOPHEN 1 TABLET: 7.5; 325 TABLET ORAL at 00:31

## 2020-12-27 RX ADMIN — DEXAMETHASONE SODIUM PHOSPHATE 4 MG: 4 INJECTION, SOLUTION INTRAMUSCULAR; INTRAVENOUS at 02:19

## 2020-12-27 RX ADMIN — DEXAMETHASONE SODIUM PHOSPHATE 4 MG: 4 INJECTION, SOLUTION INTRAMUSCULAR; INTRAVENOUS at 14:46

## 2020-12-27 RX ADMIN — POLYETHYLENE GLYCOL (3350) 17 G: 17 POWDER, FOR SOLUTION ORAL at 14:54

## 2020-12-27 RX ADMIN — DEXAMETHASONE SODIUM PHOSPHATE 4 MG: 4 INJECTION, SOLUTION INTRAMUSCULAR; INTRAVENOUS at 20:21

## 2020-12-27 RX ADMIN — OXYCODONE HYDROCHLORIDE 10 MG: 10 TABLET, FILM COATED, EXTENDED RELEASE ORAL at 09:42

## 2020-12-27 RX ADMIN — Medication 10 ML: at 09:43

## 2020-12-27 RX ADMIN — SODIUM CHLORIDE: 234 INJECTION INTRAMUSCULAR; INTRAVENOUS; SUBCUTANEOUS at 05:32

## 2020-12-27 RX ADMIN — DEXAMETHASONE SODIUM PHOSPHATE 4 MG: 4 INJECTION, SOLUTION INTRAMUSCULAR; INTRAVENOUS at 09:42

## 2020-12-27 RX ADMIN — STANDARDIZED SENNA CONCENTRATE AND DOCUSATE SODIUM 2 TABLET: 8.6; 5 TABLET ORAL at 14:46

## 2020-12-27 RX ADMIN — OXYCODONE HYDROCHLORIDE 10 MG: 10 TABLET, FILM COATED, EXTENDED RELEASE ORAL at 20:21

## 2020-12-27 RX ADMIN — OXYCODONE HYDROCHLORIDE AND ACETAMINOPHEN 1 TABLET: 7.5; 325 TABLET ORAL at 14:45

## 2020-12-27 RX ADMIN — Medication 10 ML: at 20:26

## 2020-12-27 ASSESSMENT — PAIN DESCRIPTION - FREQUENCY: FREQUENCY: CONTINUOUS

## 2020-12-27 ASSESSMENT — PAIN DESCRIPTION - PROGRESSION: CLINICAL_PROGRESSION: NOT CHANGED

## 2020-12-27 ASSESSMENT — PAIN DESCRIPTION - LOCATION
LOCATION: RIB CAGE
LOCATION: RIB CAGE

## 2020-12-27 ASSESSMENT — PAIN DESCRIPTION - DESCRIPTORS: DESCRIPTORS: ACHING;DISCOMFORT

## 2020-12-27 ASSESSMENT — PAIN SCALES - GENERAL
PAINLEVEL_OUTOF10: 3
PAINLEVEL_OUTOF10: 5
PAINLEVEL_OUTOF10: 5
PAINLEVEL_OUTOF10: 6
PAINLEVEL_OUTOF10: 5

## 2020-12-27 ASSESSMENT — PAIN DESCRIPTION - ORIENTATION
ORIENTATION: RIGHT
ORIENTATION: RIGHT

## 2020-12-27 ASSESSMENT — PAIN - FUNCTIONAL ASSESSMENT: PAIN_FUNCTIONAL_ASSESSMENT: ACTIVITIES ARE NOT PREVENTED

## 2020-12-27 NOTE — PROGRESS NOTES
Pulmonary Progress Note    CC: Pleural effusion    Subjective:   Feels better  Room air    IV line:      Intake/Output Summary (Last 24 hours) at 12/27/2020 0736  Last data filed at 12/27/2020 0024  Gross per 24 hour   Intake 1080 ml   Output 400 ml   Net 680 ml       Exam:   /80   Pulse 94   Temp 97.5 °F (36.4 °C) (Oral)   Resp 18   Ht 6' 1\" (1.854 m)   Wt 177 lb 1.6 oz (80.3 kg)   SpO2 91%   BMI 23.37 kg/m²  on room air  Gen: + Distress. Ill-appearing  Eyes: PERRL. No sclera icterus. No conjunctival injection. ENT: No discharge. Pharynx clear. Neck: Trachea midline. No obvious mass. Resp:  + Accessory muscle use. No crackles. No wheezes. Few rhonchi. R dullness on percussion. CV: Regular rate. Regular rhythm. No murmur or rub. RUE edema-somewhat improving  GI: Non-tender. Non-distended. No hernia. Skin: Warm and dry. No nodule on exposed extremities. Lymph: No cervical LAD. R supraclavicular LAD. Right axillary lymphadenopathy   M/S: No cyanosis. No joint deformity. No clubbing. Neuro: Awake. Alert. Moves all four extremities. Psych: Oriented x 3. No anxiety.      Scheduled Meds:   oxyCODONE  10 mg Oral 2 times per day    enoxaparin  1 mg/kg Subcutaneous BID    dexamethasone  4 mg Intravenous Q6H    sodium chloride flush  10 mL Intravenous 2 times per day     Continuous Infusions:   IV infusion builder 60 mL/hr at 12/27/20 0532     PRN Meds:  oxyCODONE-acetaminophen, acetaminophen, sodium chloride flush, promethazine **OR** ondansetron, polyethylene glycol, acetaminophen **OR** acetaminophen, morphine    Labs:  CBC:   Recent Labs     12/25/20 0527 12/26/20  0516 12/27/20  0646   WBC 14.6* 16.7* 14.7*   HGB 12.9* 13.4* 12.2*   HCT 40.1* 41.1 37.1*   MCV 80.0 79.8* 78.5*    363 413     BMP:   Recent Labs     12/26/20 1959 12/27/20  0202 12/27/20  0646   * 129* 130*   K 4.5 4.6 4.3   CL 94* 96* 96*   CO2 26 23 24   BUN 15 13 12   CREATININE 0.6* <0.5* 0.6* LIVER PROFILE:   Recent Labs     12/24/20  1230 12/25/20  0527 12/26/20  0516 12/27/20  0646   AST 6* 9* 10* 7*   ALT 9* 13 10 9*   LIPASE 18.0  --   --   --    BILITOT <0.2 <0.2 0.3 <0.2   ALKPHOS 83 85 113 101     PT/INR: No results for input(s): PROTIME, INR in the last 72 hours. APTT:   Recent Labs     12/25/20 2003 12/26/20  1029 12/27/20  0646   APTT 53.0* 36.7* 33.8     UA:  Recent Labs     12/24/20 2050   COLORU Yellow   PHUR 5.0   CLARITYU Clear   SPECGRAV 1.025   LEUKOCYTESUR Negative   UROBILINOGEN 0.2   BILIRUBINUR Negative   BLOODU Negative   GLUCOSEU Negative     No results for input(s): PHART, STU3CIN, PO2ART in the last 72 hours. Cultures:   12/24 BC NGTD  12/24 pleural fluid no organisms    Films:  CT chest 12/24 imaging was reviewed by me and showed   Small pulmonary emboli left lower lobe   Marked abnormal mediastinal adenopathy, with compression and obscuration of the airways on the right.  There is collapse and consolidation seen of the right upper, right middle lobe, and right lower lobe.  There is abnormal pleural thickening on the right, suspicious for pleural implants, and a large loculated pleural effusion on the right. Metastatic adenopathy seen in the right axillary region, and supraclavicular   region.            ASSESSMENT:  · Acute PE  · Probable SVC syndrome  · Mediastinal/right hilar mass compressing right-sided airways with right lung collapse, pleural implants, axillary/supraclavicular lymphadenopathy, diffuse infiltrative nodularities throughout the peritoneum. Lymphoma versus metastatic bronchogenic carcinoma  · Right-sided pleural effusion-likely malignant effusion.  Post thoracentesis 12/24 800 mL bloody  · Weight loss     PLAN:  · Supplemental oxygen to maintain SaO2 >92%; wean as tolerated  · Follow-up pleural fluid cytology  · Right axillary mass will have the highest diagnostic/staging yield- Dr. Jae Bailon evaluated patient   · Lovenox therapeutic dose · Decadron 4 mg IV every 6 hours  · Might require Pleurx catheter placement

## 2020-12-27 NOTE — PROGRESS NOTES
[facility] SURGERY    PATIENT NAME: Hilary Scanlon     TODAY'S DATE: 12/27/2020    CHIEF COMPLAINT: Arm swelling    INTERVAL HISTORY/HPI:    Pt complains of persistent arm swelling. States his pain is better. No fever or chills     REVIEW OF SYSTEMS:  Pertinent positives and negatives as per interval history section    OBJECTIVE:  VITALS:  /80   Pulse 94   Temp 97.5 °F (36.4 °C) (Oral)   Resp 18   Ht 6' 1\" (1.854 m)   Wt 177 lb 1.6 oz (80.3 kg)   SpO2 91%   BMI 23.37 kg/m²     INTAKE/OUTPUT:    I/O last 3 completed shifts: In: 1080 [P.O.:1080]  Out: 400 [Urine:400]  No intake/output data recorded. CONSTITUTIONAL:  awake and alert  LUNGS:  Respirations easy and unlabored, diminished breath sounds right lung  CARD:  regular rate and rhythm  ABDOMEN:  normal bowel sounds, soft, non-distended, non-tender  LYMPHATIC: Persistent right axillary adenopathy    Data:  CBC:   Recent Labs     12/25/20 0527 12/26/20 0516 12/27/20  0646   WBC 14.6* 16.7* 14.7*   HGB 12.9* 13.4* 12.2*   HCT 40.1* 41.1 37.1*    363 413     BMP:    Recent Labs     12/26/20 1959 12/27/20 0202 12/27/20  0646   * 129* 130*   K 4.5 4.6 4.3   CL 94* 96* 96*   CO2 26 23 24   BUN 15 13 12   CREATININE 0.6* <0.5* 0.6*   GLUCOSE 143* 118* 119*     Hepatic:   Recent Labs     12/25/20 0527 12/26/20 0516 12/27/20  0646   AST 9* 10* 7*   ALT 13 10 9*   BILITOT <0.2 0.3 <0.2   ALKPHOS 85 113 101     Mag:    No results for input(s): MG in the last 72 hours. Phos:   No results for input(s): PHOS in the last 72 hours. INR: No results for input(s): INR in the last 72 hours. Radiology Review:  *Imaging personally reviewed by me.    No new imaging      ASSESSMENT AND PLAN: Right lung mass with likely diffuse metastatic disease. The pattern is more suggestive of carcinoma than lymphoma. Await cytology from his thoracentesis to see if a diagnosis can be made from this. If not, either IR or surgical biopsy of right axillary lymph node can be performed to obtain diagnosis.   We will follow     Electronically signed by Rafita Lindsay MD     [id number]

## 2020-12-27 NOTE — PROGRESS NOTES
Shift assessment complete- see flow sheet. Patient is A/Ox4. VSS. Morning medications given without difficulty. Currently on room air, SpO2 WNL. Lung sounds very diminished in R. Dyspnea on exertion  Swelling RUE. Pt reports pain to be under control currently. Patient denies any further needs. Call light explained and in reach. Will continue to monitor.

## 2020-12-27 NOTE — PROGRESS NOTES
Call from [place] saying pt's HR up to 130's-140. Writer went into room to check on pt, he was in the restroom because he felt nauseas. Pt declines medication. He states he would like to be left alone to take a nap. Pt back in bed ORA and SpO2 is 91%, .

## 2020-12-27 NOTE — FLOWSHEET NOTE
12/26/20 1923   Vital Signs   Temp 97.1 °F (36.2 °C)   Temp Source Oral   Pulse 113   Heart Rate Source Monitor   Resp 18   BP (!) 143/92   BP Location Left upper arm   Patient Position Lying left side   Level of Consciousness Alert (0)   MEWS Score 3   Oxygen Therapy   SpO2 94 %   O2 Device None (Room air)   Pt assessment complete. Pt lying in bed quietly. Lung sounds clear/diminished. Pt on room air. IV fluids infusing at 60 ml/hr. Pt ST on the monitor with . Nightly medications given. Pt c/o pain on right rib/back. No other needs at this time. Call light within reach.

## 2020-12-27 NOTE — PROGRESS NOTES
Pt is lying in bed with their eyes closed. Respirations are easy and even. Call light within reach bed in lowest position with the wheels locked. Will continue to monitor.  Jerral Crimes

## 2020-12-27 NOTE — PROGRESS NOTES
ONCOLOGY FOLLOW-UP:         PROBLEM LIST:       Patient Active Problem List   Diagnosis Code    Acute pulmonary embolism (HCC) I26.99    Axillary lymphadenopathy R59.0    Mediastinal mass J98.59    SVC syndrome I87.1       INTERVAL HISTORY:       Pt remains admitted. Still c/o R arm pain. No fever. Seen by surgery yesterday. REVIEW OF SYSTEMS:       10 point ROS completed. Pertinent positives in HPI, otherwise negative.      PHYSICAL EXAM:       Vitals:    12/27/20 0121   BP: 126/80   Pulse: 94   Resp: 18   Temp: 97.5 °F (36.4 °C)   SpO2: 91%       General appearance: alert and cooperative  Head: Normocephalic, without obvious abnormality, atraumatic  Neck: No palpable lymphadenopathy in supraclavicular or cervical chains  Lungs: Clear to auscultation bilaterally, no audible rales, wheezes or crackles  Heart: Regular rate and rhythm, S1, S2 normal  Abdomen: Soft, non-tender; bowel sounds normal; no masses,  no organomegaly  Extremities: without cyanosis, clubbing, edema or asymmetry  Skin: No jaundice, purpura or petechiae      LABS:     Lab Results   Component Value Date    WBC 14.7 (H) 12/27/2020    HGB 12.2 (L) 12/27/2020    HCT 37.1 (L) 12/27/2020    MCV 78.5 (L) 12/27/2020     12/27/2020       Lab Results   Component Value Date    GLUCOSE 119 (H) 12/27/2020    BUN 12 12/27/2020    CREATININE 0.6 (L) 12/27/2020    K 4.3 12/27/2020       Lab Results   Component Value Date    ALKPHOS 101 12/27/2020    ALT 9 (L) 12/27/2020    AST 7 (L) 12/27/2020    BILITOT <0.2 12/27/2020    PROT 6.0 (L) 12/27/2020       IMAGING:     Ct Abdomen Pelvis W Iv Contrast Additional Contrast? None  Result Date: 12/24/2020 Chest: Small pulmonary emboli left lower lobe Marked abnormal mediastinal adenopathy, with compression and obscuration of the airways on the right. There is collapse and consolidation seen of the right upper, right middle lobe, and right lower lobe. There is abnormal pleural thickening on the right, suspicious for pleural implants, and a large loculated pleural effusion on the right. Metastatic adenopathy seen in the right axillary region, and supraclavicular region. Abdomen pelvis: There is widespread infiltrative nodular soft tissue density throughout the abdomen and pelvis, compatible with sequelae of peritoneal carcinomatosis. There is abnormal adenopathy in the gastrohepatic ligament, and retroperitoneum along with mild splenomegaly Ill-defined hypodense nodule in the liver and spleen, possibly metastatic foci Results discussed with Driss Kauffman by Josefina Cruz. Pantera Hidalgo MD at 2:23 pm on 12/24/2020     Ct Chest Pulmonary Embolism W Contrast  Result Date: 12/24/2020  Chest: Small pulmonary emboli left lower lobe Marked abnormal mediastinal adenopathy, with compression and obscuration of the airways on the right. There is collapse and consolidation seen of the right upper, right middle lobe, and right lower lobe. There is abnormal pleural thickening on the right, suspicious for pleural implants, and a large loculated pleural effusion on the right. Metastatic adenopathy seen in the right axillary region, and supraclavicular region. Abdomen pelvis: There is widespread infiltrative nodular soft tissue density throughout the abdomen and pelvis, compatible with sequelae of peritoneal carcinomatosis. There is abnormal adenopathy in the gastrohepatic ligament, and retroperitoneum along with mild splenomegaly Ill-defined hypodense nodule in the liver and spleen, possibly metastatic foci Results discussed with Driss Kauffman by Josefina Cruz.  Pantera Hidalgo MD at 2:23 pm on 12/24/2020       ASSESSMENT/PLAN:

## 2020-12-27 NOTE — PROGRESS NOTES
PRN percocet given for right rib pain. Pt rates pain 6/10. No other needs at this time. Call light within reach.

## 2020-12-28 PROBLEM — I26.93 SINGLE SUBSEGMENTAL PULMONARY EMBOLISM WITHOUT ACUTE COR PULMONALE (HCC): Status: ACTIVE | Noted: 2020-12-24

## 2020-12-28 LAB
A/G RATIO: 0.8 (ref 1.1–2.2)
ALBUMIN SERPL-MCNC: 2.7 G/DL (ref 3.4–5)
ALP BLD-CCNC: 93 U/L (ref 40–129)
ALT SERPL-CCNC: 9 U/L (ref 10–40)
ANION GAP SERPL CALCULATED.3IONS-SCNC: 11 MMOL/L (ref 3–16)
ANION GAP SERPL CALCULATED.3IONS-SCNC: 9 MMOL/L (ref 3–16)
ANION GAP SERPL CALCULATED.3IONS-SCNC: 9 MMOL/L (ref 3–16)
APTT: 34.5 SEC (ref 24.2–36.2)
AST SERPL-CCNC: 8 U/L (ref 15–37)
BASOPHILS ABSOLUTE: 0 K/UL (ref 0–0.2)
BASOPHILS RELATIVE PERCENT: 0.1 %
BILIRUB SERPL-MCNC: <0.2 MG/DL (ref 0–1)
BLOOD CULTURE, ROUTINE: NORMAL
BUN BLDV-MCNC: 11 MG/DL (ref 7–20)
BUN BLDV-MCNC: 11 MG/DL (ref 7–20)
BUN BLDV-MCNC: 12 MG/DL (ref 7–20)
CALCIUM SERPL-MCNC: 8.8 MG/DL (ref 8.3–10.6)
CALCIUM SERPL-MCNC: 8.9 MG/DL (ref 8.3–10.6)
CALCIUM SERPL-MCNC: 8.9 MG/DL (ref 8.3–10.6)
CHLORIDE BLD-SCNC: 93 MMOL/L (ref 99–110)
CHLORIDE BLD-SCNC: 96 MMOL/L (ref 99–110)
CHLORIDE BLD-SCNC: 96 MMOL/L (ref 99–110)
CO2: 25 MMOL/L (ref 21–32)
CO2: 26 MMOL/L (ref 21–32)
CO2: 26 MMOL/L (ref 21–32)
CREAT SERPL-MCNC: <0.5 MG/DL (ref 0.9–1.3)
CULTURE, BLOOD 2: NORMAL
EOSINOPHILS ABSOLUTE: 0 K/UL (ref 0–0.6)
EOSINOPHILS RELATIVE PERCENT: 0 %
GFR AFRICAN AMERICAN: >60
GFR NON-AFRICAN AMERICAN: >60
GLOBULIN: 3.4 G/DL
GLUCOSE BLD-MCNC: 110 MG/DL (ref 70–99)
GLUCOSE BLD-MCNC: 112 MG/DL (ref 70–99)
GLUCOSE BLD-MCNC: 118 MG/DL (ref 70–99)
HCT VFR BLD CALC: 39.7 % (ref 40.5–52.5)
HEMOGLOBIN: 13 G/DL (ref 13.5–17.5)
LYMPHOCYTES ABSOLUTE: 0.8 K/UL (ref 1–5.1)
LYMPHOCYTES RELATIVE PERCENT: 4.3 %
MCH RBC QN AUTO: 25.8 PG (ref 26–34)
MCHC RBC AUTO-ENTMCNC: 32.8 G/DL (ref 31–36)
MCV RBC AUTO: 78.6 FL (ref 80–100)
MONOCYTES ABSOLUTE: 0.9 K/UL (ref 0–1.3)
MONOCYTES RELATIVE PERCENT: 5.3 %
NEUTROPHILS ABSOLUTE: 16.2 K/UL (ref 1.7–7.7)
NEUTROPHILS RELATIVE PERCENT: 90.3 %
PDW BLD-RTO: 17.2 % (ref 12.4–15.4)
PLATELET # BLD: 481 K/UL (ref 135–450)
PMV BLD AUTO: 7 FL (ref 5–10.5)
POTASSIUM REFLEX MAGNESIUM: 4.8 MMOL/L (ref 3.5–5.1)
POTASSIUM SERPL-SCNC: 4.3 MMOL/L (ref 3.5–5.1)
POTASSIUM SERPL-SCNC: 4.6 MMOL/L (ref 3.5–5.1)
RBC # BLD: 5.05 M/UL (ref 4.2–5.9)
SODIUM BLD-SCNC: 128 MMOL/L (ref 136–145)
SODIUM BLD-SCNC: 131 MMOL/L (ref 136–145)
SODIUM BLD-SCNC: 132 MMOL/L (ref 136–145)
TOTAL PROTEIN: 6.1 G/DL (ref 6.4–8.2)
WBC # BLD: 17.9 K/UL (ref 4–11)

## 2020-12-28 PROCEDURE — 6370000000 HC RX 637 (ALT 250 FOR IP): Performed by: INTERNAL MEDICINE

## 2020-12-28 PROCEDURE — 99232 SBSQ HOSP IP/OBS MODERATE 35: CPT | Performed by: INTERNAL MEDICINE

## 2020-12-28 PROCEDURE — 6370000000 HC RX 637 (ALT 250 FOR IP): Performed by: NURSE PRACTITIONER

## 2020-12-28 PROCEDURE — 6360000002 HC RX W HCPCS: Performed by: INTERNAL MEDICINE

## 2020-12-28 PROCEDURE — 36415 COLL VENOUS BLD VENIPUNCTURE: CPT

## 2020-12-28 PROCEDURE — 80053 COMPREHEN METABOLIC PANEL: CPT

## 2020-12-28 PROCEDURE — 85025 COMPLETE CBC W/AUTO DIFF WBC: CPT

## 2020-12-28 PROCEDURE — 2580000003 HC RX 258: Performed by: PHYSICIAN ASSISTANT

## 2020-12-28 PROCEDURE — 99233 SBSQ HOSP IP/OBS HIGH 50: CPT | Performed by: INTERNAL MEDICINE

## 2020-12-28 PROCEDURE — 2060000000 HC ICU INTERMEDIATE R&B

## 2020-12-28 PROCEDURE — 85730 THROMBOPLASTIN TIME PARTIAL: CPT

## 2020-12-28 RX ORDER — CALCIUM CARBONATE 200(500)MG
500 TABLET,CHEWABLE ORAL 3 TIMES DAILY PRN
Status: DISCONTINUED | OUTPATIENT
Start: 2020-12-28 | End: 2020-12-31 | Stop reason: HOSPADM

## 2020-12-28 RX ADMIN — OXYCODONE HYDROCHLORIDE 10 MG: 10 TABLET, FILM COATED, EXTENDED RELEASE ORAL at 09:47

## 2020-12-28 RX ADMIN — Medication 10 ML: at 09:48

## 2020-12-28 RX ADMIN — STANDARDIZED SENNA CONCENTRATE AND DOCUSATE SODIUM 2 TABLET: 8.6; 5 TABLET ORAL at 09:47

## 2020-12-28 RX ADMIN — ENOXAPARIN SODIUM 80 MG: 80 INJECTION SUBCUTANEOUS at 09:47

## 2020-12-28 RX ADMIN — Medication 10 ML: at 20:35

## 2020-12-28 RX ADMIN — OXYCODONE HYDROCHLORIDE 10 MG: 10 TABLET, FILM COATED, EXTENDED RELEASE ORAL at 20:35

## 2020-12-28 RX ADMIN — MAGNESIUM CITRATE 296 ML: 1.75 LIQUID ORAL at 15:20

## 2020-12-28 RX ADMIN — POLYETHYLENE GLYCOL (3350) 17 G: 17 POWDER, FOR SOLUTION ORAL at 09:47

## 2020-12-28 RX ADMIN — CALCIUM CARBONATE (ANTACID) CHEW TAB 500 MG 500 MG: 500 CHEW TAB at 14:05

## 2020-12-28 RX ADMIN — DEXAMETHASONE SODIUM PHOSPHATE 4 MG: 4 INJECTION, SOLUTION INTRAMUSCULAR; INTRAVENOUS at 02:30

## 2020-12-28 RX ADMIN — ENOXAPARIN SODIUM 80 MG: 80 INJECTION SUBCUTANEOUS at 20:35

## 2020-12-28 RX ADMIN — DEXAMETHASONE SODIUM PHOSPHATE 4 MG: 4 INJECTION, SOLUTION INTRAMUSCULAR; INTRAVENOUS at 14:05

## 2020-12-28 RX ADMIN — DEXAMETHASONE SODIUM PHOSPHATE 4 MG: 4 INJECTION, SOLUTION INTRAMUSCULAR; INTRAVENOUS at 20:35

## 2020-12-28 RX ADMIN — OXYCODONE HYDROCHLORIDE AND ACETAMINOPHEN 1 TABLET: 7.5; 325 TABLET ORAL at 18:16

## 2020-12-28 RX ADMIN — DEXAMETHASONE SODIUM PHOSPHATE 4 MG: 4 INJECTION, SOLUTION INTRAMUSCULAR; INTRAVENOUS at 09:48

## 2020-12-28 RX ADMIN — OXYCODONE HYDROCHLORIDE AND ACETAMINOPHEN 1 TABLET: 7.5; 325 TABLET ORAL at 02:30

## 2020-12-28 RX ADMIN — OXYCODONE HYDROCHLORIDE AND ACETAMINOPHEN 1 TABLET: 7.5; 325 TABLET ORAL at 13:15

## 2020-12-28 ASSESSMENT — PAIN DESCRIPTION - DESCRIPTORS
DESCRIPTORS: CRAMPING;DISCOMFORT

## 2020-12-28 ASSESSMENT — PAIN - FUNCTIONAL ASSESSMENT
PAIN_FUNCTIONAL_ASSESSMENT: PREVENTS OR INTERFERES SOME ACTIVE ACTIVITIES AND ADLS
PAIN_FUNCTIONAL_ASSESSMENT: PREVENTS OR INTERFERES SOME ACTIVE ACTIVITIES AND ADLS

## 2020-12-28 ASSESSMENT — PAIN SCALES - GENERAL
PAINLEVEL_OUTOF10: 8
PAINLEVEL_OUTOF10: 6
PAINLEVEL_OUTOF10: 8
PAINLEVEL_OUTOF10: 3

## 2020-12-28 ASSESSMENT — PAIN DESCRIPTION - LOCATION
LOCATION: ABDOMEN

## 2020-12-28 ASSESSMENT — PAIN DESCRIPTION - ORIENTATION
ORIENTATION: RIGHT;LEFT

## 2020-12-28 ASSESSMENT — PAIN DESCRIPTION - FREQUENCY
FREQUENCY: CONTINUOUS
FREQUENCY: CONTINUOUS

## 2020-12-28 ASSESSMENT — PAIN DESCRIPTION - ONSET
ONSET: ON-GOING
ONSET: ON-GOING

## 2020-12-28 ASSESSMENT — PAIN DESCRIPTION - PROGRESSION
CLINICAL_PROGRESSION: NOT CHANGED
CLINICAL_PROGRESSION: NOT CHANGED

## 2020-12-28 ASSESSMENT — PAIN DESCRIPTION - PAIN TYPE
TYPE: ACUTE PAIN

## 2020-12-28 NOTE — PROGRESS NOTES
Pt C/O 6/10 pain, PRN percocet given at this time. Pt C/o abd bloating and discomfort, not able to have a BM. Pt feels SOB and requesting oxygen to be turned up.  Increased oxygen to 2.5 will monitor Stephanie Garcia

## 2020-12-28 NOTE — PROGRESS NOTES
Hospitalist Progress Note      PCP: No primary care provider on file. Date of Admission: 12/24/2020    Chief Complaint: patient presented from from retirement with c/o weight loss, right upper extremity, right chest swelling. Also reports dyspnea ongoing for several months. He also had chronic/worsening chest pain      Subjective:     Pain control better today. R arm edema persisting. Also continues to feel short of breath    Constipated on opiate pain control. Medications:  Reviewed    Infusion Medications    IV infusion builder 60 mL/hr at 12/27/20 0532     Scheduled Medications    polyethylene glycol  17 g Oral Daily    sennosides-docusate sodium  2 tablet Oral Daily    oxyCODONE  10 mg Oral 2 times per day    enoxaparin  1 mg/kg Subcutaneous BID    dexamethasone  4 mg Intravenous Q6H    sodium chloride flush  10 mL Intravenous 2 times per day     PRN Meds: oxyCODONE-acetaminophen, acetaminophen, sodium chloride flush, promethazine **OR** ondansetron, polyethylene glycol, acetaminophen **OR** acetaminophen, morphine      Intake/Output Summary (Last 24 hours) at 12/28/2020 1003  Last data filed at 12/28/2020 0912  Gross per 24 hour   Intake 1417 ml   Output 300 ml   Net 1117 ml       Physical Exam Performed:    BP (!) 141/87   Pulse 101   Temp 96.2 °F (35.7 °C) (Oral)   Resp 18   Ht 6' 1\" (1.854 m)   Wt 180 lb 9.6 oz (81.9 kg)   SpO2 94%   BMI 23.83 kg/m²     General appearance: No apparent distress appears stated age and cooperative. HEENT Normal cephalic, atraumatic without obvious deformity. Pupils equal, round, and reactive to light. Extra ocular muscles intact. Conjunctivae/corneas clear. Neck: Supple, No jugular venous distention/bruits. Trachea midline   Lungs: No rhonchi, no wheezing, no rales, diminished sounds - worse on the right.    Heart: Regular rate and rhythm with Normal S1/S2 without murmurs, rubs or gallops Abdomen: Soft, non-tender or non-distended without rigidity or guarding and positive bowel sounds all four quadrants. Extremities: RUE edema. Skin: Skin color, texture, turgor normal.  No rashes or lesions. Neurologic: Alert and oriented X 3, neurovascularly intact with sensory/motor intact upper extremities/lower extremities, bilaterally. Cranial nerves: II-XII intact, grossly non-focal.  Mental status: Alert, oriented, thought content appropriate. Capillary Refill: Acceptable  < 3 seconds  Peripheral Pulses: +3 Easily felt, not easily obliterated with pressure         I Juliet Myles have reviewed the chart on [facility] and personally interviewed and examined patient, reviewed the data (labs and imaging) and after discussion with my PA formulated the plan. Agree with note with the following edits. HPI:     I reviewed the patient's Past Medical History, Past Surgical History, Medications, and Allergies. Edema of right upper ext better      General:  Awake, alert and oriented.  Appears to be not in any distress  Mucous Membranes:  Pink , anicteric  Neck: No JVD, no carotid bruit, no thyromegaly  Chest: diminished in right mid and bases, clear on left side  Cardiovascular:  RRR S1S2 heard, no murmurs or gallops  Abdomen:  Soft, undistended, non tender, no organomegaly, BS present  Extremities: right UE 1 + edema  Distal pulses well felt  Neurological : grossly normal                Labs:   Recent Labs     12/26/20 0516 12/27/20 0646 12/28/20 0417   WBC 16.7* 14.7* 17.9*   HGB 13.4* 12.2* 13.0*   HCT 41.1 37.1* 39.7*    413 481*     Recent Labs     12/27/20  1935 12/28/20 0226 12/28/20 0417   * 128* 132*  131*   K 4.7 4.3 4.6  4.8   CL 95* 93* 96*  96*   CO2 27 26 25  26   BUN 14 12 11  11   CREATININE <0.5* <0.5* <0.5*  <0.5*   CALCIUM 8.6 8.9 8.8  8.9     Recent Labs     12/26/20  0516 12/27/20  0646 12/28/20 0417   AST 10* 7* 8*   ALT 10 9* 9* BILITOT 0.3 <0.2 <0.2   ALKPHOS 113 101 93     No results for input(s): INR in the last 72 hours. No results for input(s): [place] in the last 72 hours. Urinalysis:      Lab Results   Component Value Date    NITRU Negative 12/24/2020    BLOODU Negative 12/24/2020    SPECGRAV 1.025 12/24/2020    GLUCOSEU Negative 12/24/2020     Cultures  Blood: NGTD  Pleural fluid: NGTD  COVID: not detected    Radiology:  US THORACENTESIS Which side should the procedure be performed? Radiologist Recommendation   Final Result   Successful ultrasound guided right thoracentesis. XR CHEST PORTABLE   Final Result   No definite pneumothorax on the right status post thoracentesis      Complete opacification of the right hemithorax, due to pleuroparenchymal   disease         CT ABDOMEN PELVIS W IV CONTRAST Additional Contrast? None   Final Result   Chest:      Small pulmonary emboli left lower lobe      Marked abnormal mediastinal adenopathy, with compression and obscuration of   the airways on the right. There is collapse and consolidation seen of the   right upper, right middle lobe, and right lower lobe. There is abnormal   pleural thickening on the right, suspicious for pleural implants, and a large   loculated pleural effusion on the right. Metastatic adenopathy seen in the right axillary region, and supraclavicular   region. Abdomen pelvis: There is widespread infiltrative nodular soft tissue density throughout the   abdomen and pelvis, compatible with sequelae of peritoneal carcinomatosis. There is abnormal adenopathy in the gastrohepatic ligament, and   retroperitoneum along with mild splenomegaly      Ill-defined hypodense nodule in the liver and spleen, possibly metastatic foci      Results discussed with Destini Mensah by Steven Elliott MD at 2:23 pm on   12/24/2020         CT CHEST PULMONARY EMBOLISM W CONTRAST   Final Result   Chest:      Small pulmonary emboli left lower lobe Marked abnormal mediastinal adenopathy, with compression and obscuration of   the airways on the right. There is collapse and consolidation seen of the   right upper, right middle lobe, and right lower lobe. There is abnormal   pleural thickening on the right, suspicious for pleural implants, and a large   loculated pleural effusion on the right. Metastatic adenopathy seen in the right axillary region, and supraclavicular   region. Abdomen pelvis: There is widespread infiltrative nodular soft tissue density throughout the   abdomen and pelvis, compatible with sequelae of peritoneal carcinomatosis. There is abnormal adenopathy in the gastrohepatic ligament, and   retroperitoneum along with mild splenomegaly      Ill-defined hypodense nodule in the liver and spleen, possibly metastatic foci      Results discussed with Emily Herrera by Vale Albarado. Otto Cabral MD at 2:23 pm on   12/24/2020                 Assessment/Plan:    Active Hospital Problems    Diagnosis    Axillary lymphadenopathy [R59.0]    Mediastinal mass [J98.59]    SVC syndrome [I87.1]    Acute pulmonary embolism (HCC) [I26.99]       Abnormal CT Chest and Abdomen  Highly concerning for wide spread malignancy with carcinomatosis  - right hilar mass with compression of right sided airways and right lung collapse, pleural implants, axillary , supraclavicular LN     - primary unknown - possibly lung vs lymphoma   HemOnc and Pulm involved. S/p thoracentesis - Rt loculated pleural effusion - 800cc of fluid taken - cytology pending.    if no results , can do axillary LN bx     Acute PE  Likely secondary to malignancy. On heparin gtt - to lovenox - access problems. - plan NOAC on discharge        Chest Pain - he likely has widely metastatic Ca. Multi focal pain - mostly in R chest and chest wall. I suspect bone involvement with cancer. Added oxy ER and continue percocet for break through symptoms. Avoid IV opiate as able. He has not received IV morphine in the last couple days. Right UE edema and dyspnea   - sec to compressive symptoms- likely SVC syndrome  - on steroids per Pulm   - remains on RA         Constipation - add on bowel regimen today. Consider Relistor        DVT Prophylaxis: on heparin gtt to lovenox.    Diet: DIET GENERAL;  Code Status: Full Code        Dispo - cc.      Tyesha [id number]  12/28/2020     Agree with above  Changes made to note    Renata Ragsdale MD 12/28/2020 1:26 PM

## 2020-12-28 NOTE — PROGRESS NOTES
Pulmonary Progress Note    CC: Pleural effusion    Subjective:   Patient feels about the same      Intake/Output Summary (Last 24 hours) at 12/28/2020 1310  Last data filed at 12/28/2020 0912  Gross per 24 hour   Intake 1417 ml   Output 300 ml   Net 1117 ml       Exam:   BP (!) 141/87   Pulse 101   Temp 96.2 °F (35.7 °C) (Oral)   Resp 18   Ht 6' 1\" (1.854 m)   Wt 180 lb 9.6 oz (81.9 kg)   SpO2 94%   BMI 23.83 kg/m²  on room air  Gen: No Distress. Ill-appearing  Eyes: PERRL. No sclera icterus. No conjunctival injection. ENT: No discharge. Pharynx clear. Neck: Trachea midline. No obvious mass. Resp:  No Accessory muscle use. No crackles. No wheezes. Few rhonchi. R dullness on percussion. CV: Regular rate. Regular rhythm. No murmur or rub. GI: Non-tender. Non-distended. No hernia. Skin: Warm and dry. No nodule on exposed extremities. Lymph: No cervical LAD. R supraclavicular LAD. Right axillary lymphadenopathy    M/S: No cyanosis. No joint deformity. No clubbing. Neuro: Awake. Alert. Moves all four extremities. Psych: Oriented x 3. No anxiety.      Scheduled Meds:   polyethylene glycol  17 g Oral Daily    sennosides-docusate sodium  2 tablet Oral Daily    oxyCODONE  10 mg Oral 2 times per day    enoxaparin  1 mg/kg Subcutaneous BID    dexamethasone  4 mg Intravenous Q6H    sodium chloride flush  10 mL Intravenous 2 times per day     Continuous Infusions:   IV infusion builder 60 mL/hr at 12/27/20 0532     PRN Meds:  oxyCODONE-acetaminophen, acetaminophen, sodium chloride flush, promethazine **OR** ondansetron, polyethylene glycol, acetaminophen **OR** acetaminophen, morphine    Labs:  CBC:   Recent Labs     12/26/20  0516 12/27/20  0646 12/28/20  0417   WBC 16.7* 14.7* 17.9*   HGB 13.4* 12.2* 13.0*   HCT 41.1 37.1* 39.7*   MCV 79.8* 78.5* 78.6*    413 481*     BMP:   Recent Labs     12/27/20  1935 12/28/20 0226 12/28/20  0417   * 128* 132*  131*   K 4.7 4.3 4.6  4.8 · Right axillary mass will have the highest diagnostic/staging yield- Dr. Chinyere Dickerson evaluated patient   · Lovenox therapeutic dose  · Decadron 4 mg IV every 6 hours  · Might require Pleurx catheter placement

## 2020-12-28 NOTE — PROGRESS NOTES
Pt in bed, awake, A/O X4. Shift assessment complete, night meds given. Pt C/O 5/10 pain, scheduled oxy given at this time  . No other needs expressed. Call light in reach. Will monitor.   Robina Cheadle

## 2020-12-28 NOTE — PROGRESS NOTES
[facility]       Progress Note           Subjective/   60y.o. year old male who we are seeing in consultation for Hyponatremia. HPI:    He is constipated so not eating or drinking much  Kidney function normal  Sodium is up to 132  Passing gas     ROS:  +R arm pain. +LUIS.     Objective/   GEN:  Chronically ill, /83   Pulse 89   Temp 96.8 °F (36 °C) (Oral)   Resp 18   Ht 6' 1\" (1.854 m)   Wt 180 lb 9.6 oz (81.9 kg)   SpO2 98%   BMI 23.83 kg/m²   HEENT: non-icteric, no JVD  CV: S1, S2 without m/r/g; no LE edema  RESP: CTA B without w/r/r; breathing wnl  ABD: +bs, soft, nt, no hsm  SKIN: warm, no rashes    Data/  Recent Labs     12/26/20  0516 12/27/20  0646 12/28/20  0417   WBC 16.7* 14.7* 17.9*   HGB 13.4* 12.2* 13.0*   HCT 41.1 37.1* 39.7*   MCV 79.8* 78.5* 78.6*    413 481*     Recent Labs     12/27/20  1935 12/28/20  0226 12/28/20  0417   * 128* 132*  131*   K 4.7 4.3 4.6  4.8   CL 95* 93* 96*  96*   CO2 27 26 25  26   GLUCOSE 129* 118* 112*  110*   BUN 14 12 11  11   CREATININE <0.5* <0.5* <0.5*  <0.5*   LABGLOM >60 >60 >60  >60   GFRAA >60 >60 >60  >60       Assessment/      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

## 2020-12-28 NOTE — PROGRESS NOTES
ONCOLOGY FOLLOW-UP:         PROBLEM LIST:       Patient Active Problem List   Diagnosis Code    Acute pulmonary embolism (HCC) I26.99    Axillary lymphadenopathy R59.0    Mediastinal mass J98.59    SVC syndrome I87.1       INTERVAL HISTORY:       Arm pain stable. Still SOB. REVIEW OF SYSTEMS:       10 point ROS completed. Pertinent positives in HPI, otherwise negative.      PHYSICAL EXAM:       Vitals:    12/28/20 0228   BP: (!) 140/91   Pulse: 88   Resp: 18   Temp: 98 °F (36.7 °C)   SpO2: 97%       General appearance: alert and cooperative  Head: Normocephalic, without obvious abnormality, atraumatic  Neck: No palpable lymphadenopathy in supraclavicular or cervical chains  Lungs: Clear to auscultation bilaterally, no audible rales, wheezes or crackles  Heart: Regular rate and rhythm, S1, S2 normal  Abdomen: Soft, non-tender; bowel sounds normal; no masses,  no organomegaly  Extremities: without cyanosis, clubbing, edema or asymmetry  Skin: No jaundice, purpura or petechiae      LABS:     Lab Results   Component Value Date    WBC 17.9 (H) 12/28/2020    HGB 13.0 (L) 12/28/2020    HCT 39.7 (L) 12/28/2020    MCV 78.6 (L) 12/28/2020     (H) 12/28/2020       Lab Results   Component Value Date    GLUCOSE 112 (H) 12/28/2020    GLUCOSE 110 (H) 12/28/2020    BUN 11 12/28/2020    BUN 11 12/28/2020    CREATININE <0.5 (L) 12/28/2020    CREATININE <0.5 (L) 12/28/2020    K 4.8 12/28/2020    K 4.6 12/28/2020       Lab Results   Component Value Date    ALKPHOS 93 12/28/2020    ALT 9 (L) 12/28/2020    AST 8 (L) 12/28/2020    BILITOT <0.2 12/28/2020    PROT 6.1 (L) 12/28/2020       IMAGING:     Ct Abdomen Pelvis W Iv Contrast Additional Contrast? None  Result Date: 12/24/2020 1. CT Scan Suspicious for Malignancy    - CT CAP 12/24/2020 with mediastinal LAD with airway compression, R lung consolidation and collapse, pleural implants, pleural effusion, axillary and supraclavicular LAD, peritoneal carcinomatosis and possible liver and spleen metastasis  - CT scan highly suspicious for malignancy  - awaiting pleural fluid cytology   - surgery consulted for possible axillary LN biopsy - will plan biopsy if cytology not diagnostic  - further recommendations to follow once diagnosis established    2. Pulmonary Embolism    - risk factor is suspected malignancy  - Lovenox for now   - can transition to NOAC at discharge    3.  Pleural Effusion    - suspect malignant  - chest tube placed in ER  - awaiting cytology    Gilma Crowe MD

## 2020-12-28 NOTE — CARE COORDINATION
INTERDISCIPLINARY PLAN OF CARE CONFERENCE    Date/Time: 12/28/2020 1:32 PM  Completed by: Maximo Blum      Patient Name:  Durga Bello  YOB: 1965  Admitting Diagnosis: Acute pulmonary embolism, unspecified pulmonary embolism type, unspecified whether acute cor pulmonale present [facility] [I26.99]     Admit Date/Time:  12/24/2020 12:13 PM    Chart reviewed. Interdisciplinary team contacted or reviewed plan related to patient progress and discharge plans. Disciplines included Case Management, Nursing, and Dietitian. Current Status: PCU, labs, vs  PT/OT recommendation for discharge plan of care: n/a    Expected D/C Disposition: poss return to skilled nursing? Discharge Plan Comments: pt cont in PCU. Will likely return to [facility] at discharge. Following for poss Xarelto or Eliquis.      Home O2 in place on admit: No  Pt informed of need to bring portable home O2 tank on day of discharge for nursing to connect prior to leaving:  Not Indicated  Verbalized agreement/Understanding:  Not Indicated

## 2020-12-29 ENCOUNTER — APPOINTMENT (OUTPATIENT)
Dept: ULTRASOUND IMAGING | Age: 55
DRG: 829 | End: 2020-12-29

## 2020-12-29 LAB
A/G RATIO: 1 (ref 1.1–2.2)
ALBUMIN SERPL-MCNC: 2.8 G/DL (ref 3.4–5)
ALP BLD-CCNC: 77 U/L (ref 40–129)
ALT SERPL-CCNC: 7 U/L (ref 10–40)
ANION GAP SERPL CALCULATED.3IONS-SCNC: 8 MMOL/L (ref 3–16)
APTT: 33.7 SEC (ref 24.2–36.2)
AST SERPL-CCNC: 6 U/L (ref 15–37)
BASOPHILS ABSOLUTE: 0 K/UL (ref 0–0.2)
BASOPHILS RELATIVE PERCENT: 0.1 %
BILIRUB SERPL-MCNC: <0.2 MG/DL (ref 0–1)
BUN BLDV-MCNC: 11 MG/DL (ref 7–20)
CALCIUM SERPL-MCNC: 8.7 MG/DL (ref 8.3–10.6)
CHLORIDE BLD-SCNC: 93 MMOL/L (ref 99–110)
CO2: 27 MMOL/L (ref 21–32)
CREAT SERPL-MCNC: <0.5 MG/DL (ref 0.9–1.3)
EOSINOPHILS ABSOLUTE: 0 K/UL (ref 0–0.6)
EOSINOPHILS RELATIVE PERCENT: 0.1 %
GFR AFRICAN AMERICAN: >60
GFR NON-AFRICAN AMERICAN: >60
GLOBULIN: 2.9 G/DL
GLUCOSE BLD-MCNC: 105 MG/DL (ref 70–99)
HCT VFR BLD CALC: 37.8 % (ref 40.5–52.5)
HEMOGLOBIN: 12.5 G/DL (ref 13.5–17.5)
LYMPHOCYTES ABSOLUTE: 0.5 K/UL (ref 1–5.1)
LYMPHOCYTES RELATIVE PERCENT: 4.4 %
MCH RBC QN AUTO: 26.3 PG (ref 26–34)
MCHC RBC AUTO-ENTMCNC: 33.1 G/DL (ref 31–36)
MCV RBC AUTO: 79.4 FL (ref 80–100)
MONOCYTES ABSOLUTE: 0.7 K/UL (ref 0–1.3)
MONOCYTES RELATIVE PERCENT: 6.1 %
NEUTROPHILS ABSOLUTE: 11 K/UL (ref 1.7–7.7)
NEUTROPHILS RELATIVE PERCENT: 89.3 %
PDW BLD-RTO: 16.7 % (ref 12.4–15.4)
PHOSPHORUS: 3.7 MG/DL (ref 2.5–4.9)
PLATELET # BLD: 420 K/UL (ref 135–450)
PMV BLD AUTO: 6.6 FL (ref 5–10.5)
POTASSIUM REFLEX MAGNESIUM: 4.3 MMOL/L (ref 3.5–5.1)
POTASSIUM SERPL-SCNC: 4.3 MMOL/L (ref 3.5–5.1)
RBC # BLD: 4.76 M/UL (ref 4.2–5.9)
SODIUM BLD-SCNC: 128 MMOL/L (ref 136–145)
TOTAL PROTEIN: 5.7 G/DL (ref 6.4–8.2)
WBC # BLD: 12.3 K/UL (ref 4–11)

## 2020-12-29 PROCEDURE — 6360000002 HC RX W HCPCS: Performed by: INTERNAL MEDICINE

## 2020-12-29 PROCEDURE — 88342 IMHCHEM/IMCYTCHM 1ST ANTB: CPT

## 2020-12-29 PROCEDURE — 85025 COMPLETE CBC W/AUTO DIFF WBC: CPT

## 2020-12-29 PROCEDURE — 36415 COLL VENOUS BLD VENIPUNCTURE: CPT

## 2020-12-29 PROCEDURE — 85730 THROMBOPLASTIN TIME PARTIAL: CPT

## 2020-12-29 PROCEDURE — 88305 TISSUE EXAM BY PATHOLOGIST: CPT

## 2020-12-29 PROCEDURE — 07B53ZX EXCISION OF RIGHT AXILLARY LYMPHATIC, PERCUTANEOUS APPROACH, DIAGNOSTIC: ICD-10-PCS | Performed by: INTERNAL MEDICINE

## 2020-12-29 PROCEDURE — 6370000000 HC RX 637 (ALT 250 FOR IP): Performed by: NURSE PRACTITIONER

## 2020-12-29 PROCEDURE — 6370000000 HC RX 637 (ALT 250 FOR IP): Performed by: INTERNAL MEDICINE

## 2020-12-29 PROCEDURE — 2580000003 HC RX 258: Performed by: PHYSICIAN ASSISTANT

## 2020-12-29 PROCEDURE — 38505 NEEDLE BIOPSY LYMPH NODES: CPT

## 2020-12-29 PROCEDURE — 99232 SBSQ HOSP IP/OBS MODERATE 35: CPT | Performed by: INTERNAL MEDICINE

## 2020-12-29 PROCEDURE — 80053 COMPREHEN METABOLIC PANEL: CPT

## 2020-12-29 PROCEDURE — 99231 SBSQ HOSP IP/OBS SF/LOW 25: CPT | Performed by: SURGERY

## 2020-12-29 PROCEDURE — 88341 IMHCHEM/IMCYTCHM EA ADD ANTB: CPT

## 2020-12-29 PROCEDURE — 2060000000 HC ICU INTERMEDIATE R&B

## 2020-12-29 RX ADMIN — Medication 10 ML: at 09:01

## 2020-12-29 RX ADMIN — ENOXAPARIN SODIUM 80 MG: 80 INJECTION SUBCUTANEOUS at 21:19

## 2020-12-29 RX ADMIN — OXYCODONE HYDROCHLORIDE AND ACETAMINOPHEN 1 TABLET: 7.5; 325 TABLET ORAL at 02:28

## 2020-12-29 RX ADMIN — OXYCODONE HYDROCHLORIDE 10 MG: 10 TABLET, FILM COATED, EXTENDED RELEASE ORAL at 21:17

## 2020-12-29 RX ADMIN — OXYCODONE HYDROCHLORIDE AND ACETAMINOPHEN 1 TABLET: 7.5; 325 TABLET ORAL at 14:15

## 2020-12-29 RX ADMIN — STANDARDIZED SENNA CONCENTRATE AND DOCUSATE SODIUM 2 TABLET: 8.6; 5 TABLET ORAL at 14:16

## 2020-12-29 RX ADMIN — CALCIUM CARBONATE (ANTACID) CHEW TAB 500 MG 500 MG: 500 CHEW TAB at 14:22

## 2020-12-29 RX ADMIN — Medication 10 ML: at 21:18

## 2020-12-29 RX ADMIN — DEXAMETHASONE SODIUM PHOSPHATE 4 MG: 4 INJECTION, SOLUTION INTRAMUSCULAR; INTRAVENOUS at 09:01

## 2020-12-29 RX ADMIN — DEXAMETHASONE SODIUM PHOSPHATE 4 MG: 4 INJECTION, SOLUTION INTRAMUSCULAR; INTRAVENOUS at 14:15

## 2020-12-29 RX ADMIN — CALCIUM CARBONATE (ANTACID) CHEW TAB 500 MG 500 MG: 500 CHEW TAB at 00:14

## 2020-12-29 RX ADMIN — DEXAMETHASONE SODIUM PHOSPHATE 4 MG: 4 INJECTION, SOLUTION INTRAMUSCULAR; INTRAVENOUS at 21:18

## 2020-12-29 RX ADMIN — DEXAMETHASONE SODIUM PHOSPHATE 4 MG: 4 INJECTION, SOLUTION INTRAMUSCULAR; INTRAVENOUS at 02:28

## 2020-12-29 ASSESSMENT — PAIN SCALES - GENERAL
PAINLEVEL_OUTOF10: 8
PAINLEVEL_OUTOF10: 0
PAINLEVEL_OUTOF10: 6
PAINLEVEL_OUTOF10: 4
PAINLEVEL_OUTOF10: 9

## 2020-12-29 ASSESSMENT — PAIN DESCRIPTION - ORIENTATION: ORIENTATION: RIGHT;LEFT;LOWER

## 2020-12-29 ASSESSMENT — PAIN - FUNCTIONAL ASSESSMENT: PAIN_FUNCTIONAL_ASSESSMENT: PREVENTS OR INTERFERES SOME ACTIVE ACTIVITIES AND ADLS

## 2020-12-29 ASSESSMENT — PAIN DESCRIPTION - ONSET: ONSET: ON-GOING

## 2020-12-29 ASSESSMENT — PAIN DESCRIPTION - LOCATION
LOCATION: ABDOMEN
LOCATION: ABDOMEN

## 2020-12-29 ASSESSMENT — PAIN DESCRIPTION - FREQUENCY
FREQUENCY: INTERMITTENT
FREQUENCY: CONTINUOUS

## 2020-12-29 ASSESSMENT — PAIN DESCRIPTION - DESCRIPTORS
DESCRIPTORS: DISCOMFORT
DESCRIPTORS: ACHING;DISCOMFORT;DULL

## 2020-12-29 ASSESSMENT — PAIN DESCRIPTION - PAIN TYPE
TYPE: ACUTE PAIN
TYPE: ACUTE PAIN

## 2020-12-29 ASSESSMENT — PAIN DESCRIPTION - PROGRESSION: CLINICAL_PROGRESSION: NOT CHANGED

## 2020-12-29 NOTE — PROGRESS NOTES
Image guided right lymph node biopsy completed. Two core samples obtained from the right axillary lymph node. Pt tolerated procedure without any signs or symptoms of distress. Vital signs stable see flow sheets. Report called to Luis CHRISTENSEN on PCU. Pt transported back to PCU in stable condition via bed by transport.

## 2020-12-29 NOTE — FLOWSHEET NOTE
12/29/20 1400   Vital Signs   Temp 98.5 °F (36.9 °C)   Temp Source Oral   Pulse 102   Heart Rate Source Monitor   Resp 18   BP (!) 143/95   BP Location Left upper arm   Patient Position Semi fowlers   Level of Consciousness Alert (0)   MEWS Score 2   Patient Currently in Pain Denies   Oxygen Therapy   SpO2 95 %   Pulse Oximeter Device Mode Intermittent   Pulse Oximeter Device Location Finger   O2 Device None (Room air)   Patient is back from biopsy. Perfectserve sent to  in regards to patient's diet. Per Dr. Ian Motta patient can eat. Snack provided. PRN percocet given. PRN tums given. Patient denies any other needs. Will continue to monitor.

## 2020-12-29 NOTE — PROGRESS NOTES
ONCOLOGY FOLLOW-UP:         PROBLEM LIST:       Patient Active Problem List   Diagnosis Code    Single subsegmental pulmonary embolism without acute cor pulmonale (HCC) I26.93    Axillary lymphadenopathy R59.0    Mediastinal mass J98.59    SVC syndrome I87.1    Malignant pleural effusion J91.0    Malignant neoplasm of lung (HCC) C34.90       INTERVAL HISTORY:       Afebrile. Pleural fluid cytology is still pending. REVIEW OF SYSTEMS:       10 point ROS completed. Pertinent positives in HPI, otherwise negative.      PHYSICAL EXAM:       Vitals:    12/29/20 0227   BP: (!) 157/95   Pulse: 87   Resp: 18   Temp: 96.6 °F (35.9 °C)   SpO2: 98%       General appearance: alert and cooperative  Head: Normocephalic, without obvious abnormality, atraumatic  Neck: No palpable lymphadenopathy in supraclavicular or cervical chains  Lungs: Clear to auscultation bilaterally, no audible rales, wheezes or crackles  Heart: Regular rate and rhythm, S1, S2 normal  Abdomen: Soft, non-tender; bowel sounds normal; no masses,  no organomegaly  Extremities: without cyanosis, clubbing, edema or asymmetry  Skin: No jaundice, purpura or petechiae      LABS:     Lab Results   Component Value Date    WBC 12.3 (H) 12/29/2020    HGB 12.5 (L) 12/29/2020    HCT 37.8 (L) 12/29/2020    MCV 79.4 (L) 12/29/2020     12/29/2020       Lab Results   Component Value Date    GLUCOSE 105 (H) 12/29/2020    BUN 11 12/29/2020    CREATININE <0.5 (L) 12/29/2020    K 4.3 12/29/2020    K 4.3 12/29/2020    PHOS 3.7 12/29/2020       Lab Results   Component Value Date    ALKPHOS 77 12/29/2020    ALT 7 (L) 12/29/2020    AST 6 (L) 12/29/2020    BILITOT <0.2 12/29/2020    PROT 5.7 (L) 12/29/2020       IMAGING:     Ct Abdomen Pelvis W Iv Contrast Additional Contrast? None  Result Date: 12/24/2020 Chest: Small pulmonary emboli left lower lobe Marked abnormal mediastinal adenopathy, with compression and obscuration of the airways on the right. There is collapse and consolidation seen of the right upper, right middle lobe, and right lower lobe. There is abnormal pleural thickening on the right, suspicious for pleural implants, and a large loculated pleural effusion on the right. Metastatic adenopathy seen in the right axillary region, and supraclavicular region. Abdomen pelvis: There is widespread infiltrative nodular soft tissue density throughout the abdomen and pelvis, compatible with sequelae of peritoneal carcinomatosis. There is abnormal adenopathy in the gastrohepatic ligament, and retroperitoneum along with mild splenomegaly Ill-defined hypodense nodule in the liver and spleen, possibly metastatic foci Results discussed with Edgar Hopper by Debo Magallanes. Alex Tran MD at 2:23 pm on 12/24/2020     Ct Chest Pulmonary Embolism W Contrast  Result Date: 12/24/2020  Chest: Small pulmonary emboli left lower lobe Marked abnormal mediastinal adenopathy, with compression and obscuration of the airways on the right. There is collapse and consolidation seen of the right upper, right middle lobe, and right lower lobe. There is abnormal pleural thickening on the right, suspicious for pleural implants, and a large loculated pleural effusion on the right. Metastatic adenopathy seen in the right axillary region, and supraclavicular region. Abdomen pelvis: There is widespread infiltrative nodular soft tissue density throughout the abdomen and pelvis, compatible with sequelae of peritoneal carcinomatosis. There is abnormal adenopathy in the gastrohepatic ligament, and retroperitoneum along with mild splenomegaly Ill-defined hypodense nodule in the liver and spleen, possibly metastatic foci Results discussed with Edgar Hopper by Debo Tran MD at 2:23 pm on 12/24/2020     PATHOLOGY: Pleural fluid cytology 12/24/2020: pending    ASSESSMENT/PLAN:     1. CT Scan Suspicious for Malignancy    - CT CAP 12/24/2020 with mediastinal LAD with airway compression, R lung consolidation and collapse, pleural implants, pleural effusion, axillary and supraclavicular LAD, peritoneal carcinomatosis and possible liver and spleen metastasis  - CT scan highly suspicious for malignancy  - awaiting pleural fluid cytology   - surgery consulted for possible axillary LN biopsy   - if pleural fluid cytology not diagnostic, plan needle biopsy of axillary LN tomorrow  - further recommendations to follow once diagnosis established    2. Pulmonary Embolism    - risk factor is suspected malignancy  - Lovenox for now   - can transition to NOAC at discharge    3.  Pleural Effusion    - suspect malignant  - thoracentesis done in ER  - awaiting cytology    Maxi Moyer MD

## 2020-12-29 NOTE — PROGRESS NOTES
Patient self reports that he was able to have a BM today. RN did not witness BM but patient states it was large and he feels much better.

## 2020-12-29 NOTE — PROGRESS NOTES
Pt arrived for image guided right lymph node biopsy. Procedure explained including the risk and benefits of the procedure. All questions answered. Pt verbalizes understanding of the procedure and states no more questions. Consent verified. Vital signs stable see flow sheets. Labs, allergies, medications, and code status reviewed. No contraindications noted.

## 2020-12-29 NOTE — PROGRESS NOTES
Pulmonary Progress Note    CC: Pleural effusion    Subjective:   Patient had biopsy of the right axillary lymph node this afternoon. He remains short of breath with any exertion. Intake/Output Summary (Last 24 hours) at 12/29/2020 1535  Last data filed at 12/29/2020 1245  Gross per 24 hour   Intake 475 ml   Output    Net 475 ml       Exam:   BP (!) 143/95   Pulse 102   Temp 98.5 °F (36.9 °C) (Oral)   Resp 18   Ht 6' 1\" (1.854 m)   Wt 174 lb 3.2 oz (79 kg)   SpO2 95%   BMI 22.98 kg/m²  on room air  Gen: No Distress. Ill-appearing  Eyes: PERRL. No sclera icterus. No conjunctival injection. ENT: No discharge. Pharynx clear. Neck: Trachea midline. No obvious mass. Resp:  No Accessory muscle use. No crackles. No wheezes. Few rhonchi. R dullness on percussion. CV: Regular rate. Regular rhythm. No murmur or rub. GI: Non-tender. Non-distended. No hernia. Skin: Warm and dry. No nodule on exposed extremities. Lymph: No cervical LAD. R supraclavicular LAD. Right axillary lymphadenopathy    M/S: No cyanosis. No joint deformity. No clubbing. Neuro: Awake. Alert. Moves all four extremities. Psych: Oriented x 3. No anxiety.      Scheduled Meds:   polyethylene glycol  17 g Oral Daily    sennosides-docusate sodium  2 tablet Oral Daily    oxyCODONE  10 mg Oral 2 times per day    enoxaparin  1 mg/kg Subcutaneous BID    dexamethasone  4 mg Intravenous Q6H    sodium chloride flush  10 mL Intravenous 2 times per day     Continuous Infusions:    PRN Meds:  calcium carbonate, oxyCODONE-acetaminophen, acetaminophen, sodium chloride flush, promethazine **OR** ondansetron, polyethylene glycol, acetaminophen **OR** acetaminophen, morphine    Labs:  CBC:   Recent Labs     12/27/20  0646 12/28/20  0417 12/29/20  0518   WBC 14.7* 17.9* 12.3*   HGB 12.2* 13.0* 12.5*   HCT 37.1* 39.7* 37.8*   MCV 78.5* 78.6* 79.4*    481* 420     BMP:   Recent Labs     12/28/20  0226 12/28/20  0417 12/29/20  0518 * 132*  131* 128*   K 4.3 4.6  4.8 4.3  4.3   CL 93* 96*  96* 93*   CO2 26 25  26 27   PHOS  --   --  3.7   BUN 12 11  11 11   CREATININE <0.5* <0.5*  <0.5* <0.5*     LIVER PROFILE:   Recent Labs     12/27/20  0646 12/28/20 0417 12/29/20 0518   AST 7* 8* 6*   ALT 9* 9* 7*   BILITOT <0.2 <0.2 <0.2   ALKPHOS 101 93 77     PT/INR: No results for input(s): PROTIME, INR in the last 72 hours. APTT:   Recent Labs     12/27/20  0646 12/28/20 0417 12/29/20 0518   APTT 33.8 34.5 33.7     UA:  No results for input(s): NITRITE, COLORU, PHUR, LABCAST, WBCUA, RBCUA, MUCUS, TRICHOMONAS, YEAST, BACTERIA, CLARITYU, SPECGRAV, LEUKOCYTESUR, UROBILINOGEN, BILIRUBINUR, BLOODU, GLUCOSEU, AMORPHOUS in the last 72 hours. Invalid input(s): KETONESU  No results for input(s): PHART, LQO2DNS, PO2ART in the last 72 hours. Cultures:   12/24 BC NGTD  12/24 pleural fluid no organisms    Films:  CT chest 12/24 imaging was reviewed by me and showed   Small pulmonary emboli left lower lobe   Marked abnormal mediastinal adenopathy, with compression and obscuration of the airways on the right.  There is collapse and consolidation seen of the right upper, right middle lobe, and right lower lobe.  There is abnormal pleural thickening on the right, suspicious for pleural implants, and a large loculated pleural effusion on the right. Metastatic adenopathy seen in the right axillary region, and supraclavicular   region.            ASSESSMENT:  · Acute PE  · Probable SVC syndrome  · Mediastinal/right hilar mass compressing right-sided airways with right lung collapse, pleural implants, axillary/supraclavicular lymphadenopathy, diffuse infiltrative nodularities throughout the peritoneum. Lymphoma versus metastatic bronchogenic carcinoma  · Right-sided pleural effusion-likely malignant effusion.  Post thoracentesis 12/24 800 mL bloody  · Weight loss     PLAN:  · Supplemental oxygen to maintain SaO2 >92%; wean as tolerated · Follow-up pleural fluid cytology- negative  · S/p IR core biopsy of axillary LN  · Lovenox therapeutic dose  · Decadron 4 mg IV every 6 hours  · Might require Pleurx catheter placement  · Repeat chest x-ray tomorrow

## 2020-12-29 NOTE — PROGRESS NOTES
Patient's HR got up to 160s while ambulating using BR. Patient's HR is now sustaining 120s-130s. PerfectServe sent to [address] Per [address], continue to monitor.

## 2020-12-29 NOTE — PROGRESS NOTES
Hospitalist Progress Note      PCP: No primary care provider on file. Date of Admission: 12/24/2020    Chief Complaint: patient presented from from [facility] with c/o weight loss, right upper extremity, right chest swelling. Also reports dyspnea ongoing for several months. He also had chronic/worsening chest pain    Cytology from pleural fluid with no malignant cells    Subjective:     Pain control better today. R arm edema persisting. Also continues to feel short of breath        Medications:  Reviewed    Infusion Medications     Scheduled Medications    polyethylene glycol  17 g Oral Daily    sennosides-docusate sodium  2 tablet Oral Daily    oxyCODONE  10 mg Oral 2 times per day    enoxaparin  1 mg/kg Subcutaneous BID    dexamethasone  4 mg Intravenous Q6H    sodium chloride flush  10 mL Intravenous 2 times per day     PRN Meds: calcium carbonate, oxyCODONE-acetaminophen, acetaminophen, sodium chloride flush, promethazine **OR** ondansetron, polyethylene glycol, acetaminophen **OR** acetaminophen, morphine      Intake/Output Summary (Last 24 hours) at 12/29/2020 1055  Last data filed at 12/28/2020 2041  Gross per 24 hour   Intake 955 ml   Output    Net 955 ml       Physical Exam Performed:    BP (!) 139/91   Pulse 86   Temp 97 °F (36.1 °C) (Oral)   Resp 18   Ht 6' 1\" (1.854 m)   Wt 174 lb 3.2 oz (79 kg)   SpO2 94%   BMI 22.98 kg/m²     General appearance: No apparent distress appears stated age and cooperative. HEENT Normal cephalic, atraumatic without obvious deformity. Pupils equal, round, and reactive to light. Extra ocular muscles intact. Conjunctivae/corneas clear. Neck: Supple, No jugular venous distention/bruits. Trachea midline   Lungs: No rhonchi, no wheezing, no rales, diminished sounds - worse on the right.    Heart: Regular rate and rhythm with Normal S1/S2 without murmurs, rubs or gallops Abdomen: Soft, non-tender or non-distended without rigidity or guarding and positive bowel sounds all four quadrants. Extremities: RUE edema. Skin: Skin color, texture, turgor normal.  No rashes or lesions. Neurologic: Alert and oriented X 3, neurovascularly intact with sensory/motor intact upper extremities/lower extremities, bilaterally. Cranial nerves: II-XII intact, grossly non-focal.  Mental status: Alert, oriented, thought content appropriate. Capillary Refill: Acceptable  < 3 seconds  Peripheral Pulses: +3 Easily felt, not easily obliterated with pressure         I Octavio Goodwin MD  have reviewed the chart on [employer] and personally interviewed and examined patient, reviewed the data (labs and imaging) and after discussion with my PA formulated the plan. Agree with note with the following edits. HPI:     I reviewed the patient's Past Medical History, Past Surgical History, Medications, and Allergies. Edema of right upper ext better   Ambulating in room on RA   Reports pain issues      General: middle aged male,  Awake, alert and oriented.  Appears to be not in any distress  Mucous Membranes:  Pink , anicteric  Neck: No JVD, no carotid bruit, no thyromegaly  Chest: diminished in right mid and bases, clear on left side  Cardiovascular:  RRR S1S2 heard, no murmurs or gallops  Abdomen:  Soft, undistended, non tender, no organomegaly, BS present  Extremities: right UE 1 + edema  Distal pulses well felt  Neurological : grossly normal                Labs:   Recent Labs     12/27/20  0646 12/28/20  0417 12/29/20  0518   WBC 14.7* 17.9* 12.3*   HGB 12.2* 13.0* 12.5*   HCT 37.1* 39.7* 37.8*    481* 420     Recent Labs     12/28/20  0226 12/28/20  0417 12/29/20  0518   * 132*  131* 128*   K 4.3 4.6  4.8 4.3  4.3   CL 93* 96*  96* 93*   CO2 26 25  26 27   BUN 12 11  11 11   CREATININE <0.5* <0.5*  <0.5* <0.5*   CALCIUM 8.9 8.8  8.9 8.7   PHOS  --   --  3.7 Recent Labs     12/27/20  0646 12/28/20  0417 12/29/20  0518   AST 7* 8* 6*   ALT 9* 9* 7*   BILITOT <0.2 <0.2 <0.2   ALKPHOS 101 93 77     No results for input(s): INR in the last 72 hours. No results for input(s): Tomás Dawkins in the last 72 hours. Urinalysis:      Lab Results   Component Value Date    NITRU Negative 12/24/2020    BLOODU Negative 12/24/2020    SPECGRAV 1.025 12/24/2020    GLUCOSEU Negative 12/24/2020     Cultures  Blood: NGTD  Pleural fluid: NGTD  COVID: not detected    Radiology:  US THORACENTESIS Which side should the procedure be performed? Radiologist Recommendation   Final Result   Successful ultrasound guided right thoracentesis. XR CHEST PORTABLE   Final Result   No definite pneumothorax on the right status post thoracentesis      Complete opacification of the right hemithorax, due to pleuroparenchymal   disease         CT ABDOMEN PELVIS W IV CONTRAST Additional Contrast? None   Final Result   Chest:      Small pulmonary emboli left lower lobe      Marked abnormal mediastinal adenopathy, with compression and obscuration of   the airways on the right. There is collapse and consolidation seen of the   right upper, right middle lobe, and right lower lobe. There is abnormal   pleural thickening on the right, suspicious for pleural implants, and a large   loculated pleural effusion on the right. Metastatic adenopathy seen in the right axillary region, and supraclavicular   region. Abdomen pelvis: There is widespread infiltrative nodular soft tissue density throughout the   abdomen and pelvis, compatible with sequelae of peritoneal carcinomatosis. There is abnormal adenopathy in the gastrohepatic ligament, and   retroperitoneum along with mild splenomegaly      Ill-defined hypodense nodule in the liver and spleen, possibly metastatic foci      Results discussed with Any Traore by Simi Garcia.  Gabriel Leroy MD at 2:23 pm on   12/24/2020 CT CHEST PULMONARY EMBOLISM W CONTRAST   Final Result   Chest:      Small pulmonary emboli left lower lobe      Marked abnormal mediastinal adenopathy, with compression and obscuration of   the airways on the right. There is collapse and consolidation seen of the   right upper, right middle lobe, and right lower lobe. There is abnormal   pleural thickening on the right, suspicious for pleural implants, and a large   loculated pleural effusion on the right. Metastatic adenopathy seen in the right axillary region, and supraclavicular   region. Abdomen pelvis: There is widespread infiltrative nodular soft tissue density throughout the   abdomen and pelvis, compatible with sequelae of peritoneal carcinomatosis. There is abnormal adenopathy in the gastrohepatic ligament, and   retroperitoneum along with mild splenomegaly      Ill-defined hypodense nodule in the liver and spleen, possibly metastatic foci      Results discussed with Emily Herrera by Vale Albarado. Otto Cabral MD at 2:23 pm on   12/24/2020           Pathology  Right Side Pleural Fluid:      - Negative for malignancy.      - Benign mesothelial cells and chronic inflammatory cells. Assessment/Plan:    Active Hospital Problems    Diagnosis    Malignant pleural effusion [J91.0]    Malignant neoplasm of lung ([address] [C34.90]    Axillary lymphadenopathy [R59.0]    Mediastinal mass [J98.59]    SVC syndrome [I87.1]    Single subsegmental pulmonary embolism without acute cor pulmonale (HCC) [I26.93]       Abnormal CT Chest and Abdomen  Highly concerning for wide spread malignancy with carcinomatosis  - right hilar mass with compression of right sided airways and right lung collapse, pleural implants, axillary , supraclavicular LN     - primary unknown - possibly lung vs lymphoma   HemOnc and Pulm involved. S/p thoracentesis - Rt loculated pleural effusion - 800cc of fluid taken - cytology negative. Patient having lymph node biopsy today. Acute PE  Likely secondary to malignancy. On heparin gtt - to lovenox - access problems. - plan NOAC on discharge        Chest Pain - he likely has widely metastatic Ca. Multi focal pain - mostly in R chest and chest wall. I suspect bone involvement with cancer. Added oxy ER and continue percocet for break through symptoms. Avoid IV opiate as able. He has not received IV morphine in the last couple days. Right UE edema and dyspnea   - sec to compressive symptoms- likely SVC syndrome  - on steroids per Pulm   - remains on RA     Constipation - add on bowel regimen today. Consider Relistor     DVT Prophylaxis: on heparin gtt to lovenox. Diet: NPO for biopsy.    Code Status: Full Code     Dispo - cc.      Lv CORLEY  12/29/2020     Agree with above  Changes made to note    Edinson Livingston MD 12/29/2020 11:20 AM

## 2020-12-29 NOTE — PROGRESS NOTES
Pt in bed, awake, A/O X4. Shift assessment complete, night meds given. Pt C/O 6/10 pain, scheduled oxy given at this time  . No other needs expressed. Call light in reach. Will monitor.   Henrey Nageotte

## 2020-12-29 NOTE — PROGRESS NOTES
Patient very upset that he remains NPO. Educated patient on the importance of keeping him NPO until they can do the biopsy.

## 2020-12-29 NOTE — PROGRESS NOTES
[facility] SURGERY    PATIENT NAME: Deepa Evans     TODAY'S DATE: 12/29/2020    CHIEF COMPLAINT: Arm swelling    INTERVAL HISTORY/HPI:    Pt patient states he feels better today. He is still little short of breath, and his arm is still swollen. He still has pain. REVIEW OF SYSTEMS:  Pertinent positives and negatives as per interval history section    OBJECTIVE:  VITALS:  BP (!) 139/91   Pulse 86   Temp 97 °F (36.1 °C) (Oral)   Resp 18   Ht 6' 1\" (1.854 m)   Wt 174 lb 3.2 oz (79 kg)   SpO2 94%   BMI 22.98 kg/m²     INTAKE/OUTPUT:    I/O last 3 completed shifts: In: 3503 [P.O.:1435]  Out: 300 [Urine:300]  No intake/output data recorded. CONSTITUTIONAL:  awake and alert  LUNGS:  Respirations easy and unlabored, diminished breath sounds right lung  CARD:  regular rate and rhythm  ABDOMEN:  normal bowel sounds, soft, non-distended, non-tender     Data:  CBC:   Recent Labs     12/27/20  0646 12/28/20 0417 12/29/20 0518   WBC 14.7* 17.9* 12.3*   HGB 12.2* 13.0* 12.5*   HCT 37.1* 39.7* 37.8*    481* 420     BMP:    Recent Labs     12/28/20  0226 12/28/20 0417 12/29/20 0518   * 132*  131* 128*   K 4.3 4.6  4.8 4.3  4.3   CL 93* 96*  96* 93*   CO2 26 25  26 27   BUN 12 11  11 11   CREATININE <0.5* <0.5*  <0.5* <0.5*   GLUCOSE 118* 112*  110* 105*     Hepatic:   Recent Labs     12/27/20  0646 12/28/20 0417 12/29/20 0518   AST 7* 8* 6*   ALT 9* 9* 7*   BILITOT <0.2 <0.2 <0.2   ALKPHOS 101 93 77     Mag:    No results for input(s): MG in the last 72 hours. Phos:     Recent Labs     12/29/20 0518   PHOS 3.7      INR: No results for input(s): INR in the last 72 hours. Radiology Review:  *Imaging personally reviewed by me. N/A      ASSESSMENT AND PLAN:  Right lung mass with likely diffuse metastatic disease. The pattern is more suggestive of carcinoma than lymphoma. Cytology should be available today.   If this is inconclusive, plan for IR biopsy Electronically signed by Jazmyn Valenzuela MD     [id number]    Addendum: Biopsy performed by IR.  Will sign off

## 2020-12-29 NOTE — FLOWSHEET NOTE
12/29/20 0830   Vital Signs   Temp 97 °F (36.1 °C)   Temp Source Oral   Pulse 86   Heart Rate Source Monitor   Resp 18   BP (!) 139/91   BP Location Left upper arm   Patient Position Semi fowlers   Level of Consciousness Alert (0)   MEWS Score 1   Patient Currently in Pain Denies   Oxygen Therapy   SpO2 94 %   Pulse Oximeter Device Mode Intermittent   Pulse Oximeter Device Location Finger   O2 Device None (Room air)   Patient is resting showing no s/s of distress. Patient is alert and oriented. IV  Meds were given, see MAR. PO meds and lovenox held. Patient is denying any needs. Bed is in lowest position and call light is within reach. Will continue to monitor. Shift assessment complete, see flowsheets. Dr. Celestine Fernandez aware that sodium was 128 this morning.

## 2020-12-30 ENCOUNTER — APPOINTMENT (OUTPATIENT)
Dept: GENERAL RADIOLOGY | Age: 55
DRG: 829 | End: 2020-12-30

## 2020-12-30 PROBLEM — E43 SEVERE PROTEIN-CALORIE MALNUTRITION (HCC): Status: ACTIVE | Noted: 2020-12-30

## 2020-12-30 LAB
A/G RATIO: 1 (ref 1.1–2.2)
ALBUMIN SERPL-MCNC: 2.8 G/DL (ref 3.4–5)
ALP BLD-CCNC: 74 U/L (ref 40–129)
ALT SERPL-CCNC: 9 U/L (ref 10–40)
ANION GAP SERPL CALCULATED.3IONS-SCNC: 8 MMOL/L (ref 3–16)
AST SERPL-CCNC: 7 U/L (ref 15–37)
BASOPHILS ABSOLUTE: 0 K/UL (ref 0–0.2)
BASOPHILS RELATIVE PERCENT: 0.1 %
BILIRUB SERPL-MCNC: <0.2 MG/DL (ref 0–1)
BUN BLDV-MCNC: 14 MG/DL (ref 7–20)
CALCIUM SERPL-MCNC: 8.4 MG/DL (ref 8.3–10.6)
CHLORIDE BLD-SCNC: 94 MMOL/L (ref 99–110)
CO2: 26 MMOL/L (ref 21–32)
CREAT SERPL-MCNC: <0.5 MG/DL (ref 0.9–1.3)
EOSINOPHILS ABSOLUTE: 0 K/UL (ref 0–0.6)
EOSINOPHILS RELATIVE PERCENT: 0.1 %
GFR AFRICAN AMERICAN: >60
GFR NON-AFRICAN AMERICAN: >60
GLOBULIN: 2.8 G/DL
GLUCOSE BLD-MCNC: 121 MG/DL (ref 70–99)
HCT VFR BLD CALC: 37.7 % (ref 40.5–52.5)
HEMOGLOBIN: 12.6 G/DL (ref 13.5–17.5)
LYMPHOCYTES ABSOLUTE: 0.7 K/UL (ref 1–5.1)
LYMPHOCYTES RELATIVE PERCENT: 4.8 %
MCH RBC QN AUTO: 26 PG (ref 26–34)
MCHC RBC AUTO-ENTMCNC: 33.5 G/DL (ref 31–36)
MCV RBC AUTO: 77.7 FL (ref 80–100)
MONOCYTES ABSOLUTE: 0.8 K/UL (ref 0–1.3)
MONOCYTES RELATIVE PERCENT: 5.1 %
NEUTROPHILS ABSOLUTE: 13.9 K/UL (ref 1.7–7.7)
NEUTROPHILS RELATIVE PERCENT: 89.9 %
PDW BLD-RTO: 16.6 % (ref 12.4–15.4)
PLATELET # BLD: 424 K/UL (ref 135–450)
PMV BLD AUTO: 6.6 FL (ref 5–10.5)
POTASSIUM REFLEX MAGNESIUM: 3.6 MMOL/L (ref 3.5–5.1)
RBC # BLD: 4.85 M/UL (ref 4.2–5.9)
SODIUM BLD-SCNC: 128 MMOL/L (ref 136–145)
TOTAL PROTEIN: 5.6 G/DL (ref 6.4–8.2)
WBC # BLD: 15.5 K/UL (ref 4–11)

## 2020-12-30 PROCEDURE — 2060000000 HC ICU INTERMEDIATE R&B

## 2020-12-30 PROCEDURE — 85025 COMPLETE CBC W/AUTO DIFF WBC: CPT

## 2020-12-30 PROCEDURE — 6360000002 HC RX W HCPCS: Performed by: INTERNAL MEDICINE

## 2020-12-30 PROCEDURE — 6370000000 HC RX 637 (ALT 250 FOR IP): Performed by: INTERNAL MEDICINE

## 2020-12-30 PROCEDURE — 2580000003 HC RX 258: Performed by: PHYSICIAN ASSISTANT

## 2020-12-30 PROCEDURE — 36415 COLL VENOUS BLD VENIPUNCTURE: CPT

## 2020-12-30 PROCEDURE — 99232 SBSQ HOSP IP/OBS MODERATE 35: CPT | Performed by: INTERNAL MEDICINE

## 2020-12-30 PROCEDURE — 80053 COMPREHEN METABOLIC PANEL: CPT

## 2020-12-30 PROCEDURE — 6370000000 HC RX 637 (ALT 250 FOR IP): Performed by: NURSE PRACTITIONER

## 2020-12-30 PROCEDURE — 71045 X-RAY EXAM CHEST 1 VIEW: CPT

## 2020-12-30 RX ORDER — TOLVAPTAN 15 MG/1
15 TABLET ORAL ONCE
Status: COMPLETED | OUTPATIENT
Start: 2020-12-30 | End: 2020-12-30

## 2020-12-30 RX ADMIN — DEXAMETHASONE SODIUM PHOSPHATE 4 MG: 4 INJECTION, SOLUTION INTRAMUSCULAR; INTRAVENOUS at 02:44

## 2020-12-30 RX ADMIN — OXYCODONE HYDROCHLORIDE AND ACETAMINOPHEN 1 TABLET: 7.5; 325 TABLET ORAL at 13:16

## 2020-12-30 RX ADMIN — OXYCODONE HYDROCHLORIDE 10 MG: 10 TABLET, FILM COATED, EXTENDED RELEASE ORAL at 08:49

## 2020-12-30 RX ADMIN — Medication 10 ML: at 08:49

## 2020-12-30 RX ADMIN — DEXAMETHASONE SODIUM PHOSPHATE 4 MG: 4 INJECTION, SOLUTION INTRAMUSCULAR; INTRAVENOUS at 08:49

## 2020-12-30 RX ADMIN — Medication 10 ML: at 21:06

## 2020-12-30 RX ADMIN — DEXAMETHASONE SODIUM PHOSPHATE 4 MG: 4 INJECTION, SOLUTION INTRAMUSCULAR; INTRAVENOUS at 21:06

## 2020-12-30 RX ADMIN — OXYCODONE HYDROCHLORIDE AND ACETAMINOPHEN 1 TABLET: 7.5; 325 TABLET ORAL at 02:43

## 2020-12-30 RX ADMIN — OXYCODONE HYDROCHLORIDE 10 MG: 10 TABLET, FILM COATED, EXTENDED RELEASE ORAL at 21:06

## 2020-12-30 RX ADMIN — DEXAMETHASONE SODIUM PHOSPHATE 4 MG: 4 INJECTION, SOLUTION INTRAMUSCULAR; INTRAVENOUS at 15:24

## 2020-12-30 RX ADMIN — CALCIUM CARBONATE (ANTACID) CHEW TAB 500 MG 500 MG: 500 CHEW TAB at 02:54

## 2020-12-30 RX ADMIN — Medication 10 ML: at 02:44

## 2020-12-30 RX ADMIN — ENOXAPARIN SODIUM 80 MG: 80 INJECTION SUBCUTANEOUS at 21:06

## 2020-12-30 RX ADMIN — TOLVAPTAN 15 MG: 15 TABLET ORAL at 15:24

## 2020-12-30 RX ADMIN — STANDARDIZED SENNA CONCENTRATE AND DOCUSATE SODIUM 2 TABLET: 8.6; 5 TABLET ORAL at 08:49

## 2020-12-30 ASSESSMENT — PAIN DESCRIPTION - LOCATION
LOCATION: BACK;RIB CAGE;ARM
LOCATION: BACK

## 2020-12-30 ASSESSMENT — PAIN SCALES - GENERAL
PAINLEVEL_OUTOF10: 8
PAINLEVEL_OUTOF10: 8
PAINLEVEL_OUTOF10: 5
PAINLEVEL_OUTOF10: 8
PAINLEVEL_OUTOF10: 6
PAINLEVEL_OUTOF10: 8

## 2020-12-30 ASSESSMENT — PAIN DESCRIPTION - ORIENTATION: ORIENTATION: RIGHT

## 2020-12-30 ASSESSMENT — PAIN DESCRIPTION - PROGRESSION: CLINICAL_PROGRESSION: NOT CHANGED

## 2020-12-30 ASSESSMENT — PAIN DESCRIPTION - PAIN TYPE: TYPE: ACUTE PAIN

## 2020-12-30 ASSESSMENT — PAIN - FUNCTIONAL ASSESSMENT: PAIN_FUNCTIONAL_ASSESSMENT: ACTIVITIES ARE NOT PREVENTED

## 2020-12-30 NOTE — FLOWSHEET NOTE
12/30/20 1525   Vital Signs   Temp 97.4 °F (36.3 °C)   Temp Source Oral   Pulse 97   Heart Rate Source Brachial   Resp 16   /88   BP Location Left upper arm   Patient Position Semi fowlers   Level of Consciousness Alert (0)   MEWS Score 1     Pt is not scheduled to have a Pleurex Drain placed per Edgard Ferguson in Radiology. Dr. Lon Ayon that the pt could eat if he is not scheduled for the procedure. NPO order changed back to a General Diet in the computer. Called Dietary and helped him place an order for an early dinner tray. Pt resting in bed. Pt states that his back, chest, and now Right arm pain is down to a 5/10 on the pain scale.

## 2020-12-30 NOTE — CARE COORDINATION
INTERDISCIPLINARY PLAN OF CARE CONFERENCE    Date/Time: 12/30/2020 4:32 PM  Completed by: Maximo Garza Management      Patient Name:  Zena Juarez  YOB: 1965  Admitting Diagnosis: Acute pulmonary embolism, unspecified pulmonary embolism type, unspecified whether acute cor pulmonale present ([facility] [I26.99]     Admit Date/Time:  12/24/2020 12:13 PM    Chart reviewed. Interdisciplinary team contacted or reviewed plan related to patient progress and discharge plans. Disciplines included Case Management, Nursing, and Dietitian. Current Status: inpatient, stable. Acute PE- on lovenox. Hyponatremia- SIADH. Right upper chest and extremity swelling- Pathology pending from Right axillary LN bx. Abnormal CT chest and abdomen concerning for metastatic CA. - lung vs. Lymphoma, suspect bone involvement. PT/OT recommendation for discharge plan of care: n/a at this time    Expected D/C Disposition:  Return to skilled nursing? No deputy here, checking on pt every 6 hours. Discharge Plan Comments: pending work up results and plan of care decisions. Poss. xarelto or eliquis @ d/c.      Home O2 in place on admit: No  Pt informed of need to bring portable home O2 tank on day of discharge for nursing to connect prior to leaving:  Not Indicated  Verbalized agreement/Understanding:  Not Indicated

## 2020-12-30 NOTE — PROGRESS NOTES
[facility]       Progress Note           Subjective/   60y.o. year old male who we are seeing in consultation for Hyponatremia. HPI:    Bowels are moving  Would like to eat but currently NPO  No new complaints  Sodium is down to 128. ROS:  +R arm pain. +LUIS. Objective/   GEN:  Chronically ill, /88   Pulse 97   Temp 97.4 °F (36.3 °C) (Oral)   Resp 16   Ht 6' 1\" (1.854 m)   Wt 169 lb 14.4 oz (77.1 kg)   SpO2 93%   BMI 22.42 kg/m²   HEENT: non-icteric, no JVD  CV: S1, S2 without m/r/g; no LE edema  RESP: CTA B without w/r/r; breathing wnl  ABD: +bs, soft, nt, no hsm  SKIN: warm, no rashes    Data/  Recent Labs     12/28/20 0417 12/29/20 0518 12/30/20  0356   WBC 17.9* 12.3* 15.5*   HGB 13.0* 12.5* 12.6*   HCT 39.7* 37.8* 37.7*   MCV 78.6* 79.4* 77.7*   * 420 424     Recent Labs     12/28/20 0417 12/29/20  0518 12/30/20  0356   *  131* 128* 128*   K 4.6  4.8 4.3  4.3 3.6   CL 96*  96* 93* 94*   CO2 25  26 27 26   GLUCOSE 112*  110* 105* 121*   PHOS  --  3.7  --    BUN 11  11 11 14   CREATININE <0.5*  <0.5* <0.5* <0.5*   LABGLOM >60  >60 >60 >60   GFRAA >60  >60 >60 >60       Assessment/      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   U osm very high      Acute PE   - On lovenox     Hyperkalemia  - Resolved     Plan/     Try tolvaptan  Follow labs daily

## 2020-12-30 NOTE — ADT AUTH CERT
Utilization Reviews       Pleural Effusion - Care Day 6 (12/29/2020) by Nikia Rebollar RN       Review Status Review Entered   Completed 12/30/2020 10:07      Criteria Review      Care Day: 6 Care Date: 12/29/2020 Level of Care: Intermediate Care    Guideline Day 3    Clinical Status    ( ) * Hemodynamic stability    ( ) * CXR or ultrasound shows stability or improvement    ( ) * Infection absent or outpatient treatment planned    (X) * Tachypnea absent    12/30/2020 10:07 AM EST by Tai Meth      Resp 18    ( ) * Hypoxemia absent    ( ) * Pain absent or managed    ( ) * Discharge plans and education understood    Activity    ( ) * Ambulatory or acceptable for next level of care    Routes    (X) * Oral hydration, medications, and diet    12/30/2020 10:07 AM EST by Tai Meth      noted    Interventions    ( ) * Oxygen absent or at baseline need    ( ) * Chest tube absent or outpatient care arranged    * Milestone   Additional Notes   12/29/20:  HEMATOLOGY PROGRESS NOTE;   PROBLEM LIST:         Patient Active Problem List   Diagnosis Code   · Single subsegmental pulmonary embolism without acute cor pulmonale (HCC) I26.93   · Axillary lymphadenopathy R59.0   · Mediastinal mass J98.59   · SVC syndrome I87.1   · Malignant pleural effusion J91.0   · Malignant neoplasm of lung (HCC) C34.90           INTERVAL HISTORY:         Afebrile. Pleural fluid cytology is still pending.       REVIEW OF SYSTEMS:         10 point ROS completed.  Pertinent positives in HPI, otherwise negative.        PHYSICAL EXAM:         Vitals:     12/29/20 0227   BP: (!) 157/95   Pulse: 87   Resp: 18   Temp: 96.6 °F (35.9 °C)   SpO2: 98%           General appearance: alert and cooperative   Head: Normocephalic, without obvious abnormality, atraumatic   Neck: No palpable lymphadenopathy in supraclavicular or cervical chains   Lungs: Clear to auscultation bilaterally, no audible rales, wheezes or crackles Heart: Regular rate and rhythm, S1, S2 normal   Abdomen: Soft, non-tender; bowel sounds normal; no masses,  no organomegaly   Extremities: without cyanosis, clubbing, edema or asymmetry   Skin: No jaundice, purpura or petechiae           LABS:       Lab Results   Component Value Date     WBC 12.3 (H) 12/29/2020     HGB 12.5 (L) 12/29/2020     HCT 37.8 (L) 12/29/2020     MCV 79.4 (L) 12/29/2020      12/29/2020           Lab Results   Component Value Date     GLUCOSE 105 (H) 12/29/2020     BUN 11 12/29/2020     CREATININE <0.5 (L) 12/29/2020     K 4.3 12/29/2020     K 4.3 12/29/2020     PHOS 3.7 12/29/2020           Lab Results   Component Value Date     ALKPHOS 77 12/29/2020     ALT 7 (L) 12/29/2020     AST 6 (L) 12/29/2020     BILITOT <0.2 12/29/2020     PROT 5.7 (L) 12/29/2020           IMAGING:       Ct Abdomen Pelvis W Iv Contrast Additional Contrast? None   Result Date: 12/24/2020   Chest: Small pulmonary emboli left lower lobe Marked abnormal mediastinal adenopathy, with compression and obscuration of the airways on the right.  There is collapse and consolidation seen of the right upper, right middle lobe, and right lower lobe.  There is abnormal pleural thickening on the right, suspicious for pleural implants, and a large loculated pleural effusion on the right. Metastatic adenopathy seen in the right axillary region, and supraclavicular region. Abdomen pelvis: There is widespread infiltrative nodular soft tissue density throughout the abdomen and pelvis, compatible with sequelae of peritoneal carcinomatosis. There is abnormal adenopathy in the gastrohepatic ligament, and retroperitoneum along with mild splenomegaly Ill-defined hypodense nodule in the liver and spleen, possibly metastatic foci Results discussed with Kassy Meza by Phoenix Liriano.  Shawnee Thomas MD at 2:23 pm on 12/24/2020        Ct Chest Pulmonary Embolism W Contrast   Result Date: 12/24/2020     INTERVAL HISTORY/HPI:     Pt patient states he feels better today. Nay Angle is still little short of breath, and his arm is still swollen.  He still has pain.       REVIEW OF SYSTEMS:   Pertinent positives and negatives as per interval history section       OBJECTIVE:   VITALS:  BP (!) 139/91   Pulse 86   Temp 97 °F (36.1 °C) (Oral)   Resp 18   Ht 6' 1\" (1.854 m)   Wt 174 lb 3.2 oz (79 kg)   SpO2 94%   BMI 22.98 kg/m²        INTAKE/OUTPUT:     I/O last 3 completed shifts: In: 9440 [P.O.:1435]   Out: 300 [Urine:300]   No intake/output data recorded.       CONSTITUTIONAL:  awake and alert   LUNGS:  Respirations easy and unlabored, diminished breath sounds right lung   CARD:  regular rate and rhythm   ABDOMEN:  normal bowel sounds, soft, non-distended, non-tender        Data:   CBC:    Recent Labs     12/27/20 0646 12/28/20 0417 12/29/20 0518   WBC 14.7* 17.9* 12.3*   HGB 12.2* 13.0* 12.5*   HCT 37.1* 39.7* 37.8*    481* 420       BMP:     Recent Labs     12/28/20 0226 12/28/20 0417 12/29/20 0518   * 132*  131* 128*   K 4.3 4.6  4.8 4.3  4.3   CL 93* 96*  96* 93*   CO2 26 25  26 27   BUN 12 11  11 11   CREATININE <0.5* <0.5*  <0.5* <0.5*   GLUCOSE 118* 112*  110* 105*       Hepatic:    Recent Labs     12/27/20 0646 12/28/20 0417 12/29/20 0518   AST 7* 8* 6*   ALT 9* 9* 7*   BILITOT <0.2 <0.2 <0.2   ALKPHOS 101 93 77       Mag:    No results for input(s): MG in the last 72 hours. Phos:      Recent Labs     12/29/20 0518   PHOS 3.7       INR: No results for input(s): INR in the last 72 hours.       Radiology Review:  *Imaging personally reviewed by me.     N/A           ASSESSMENT AND PLAN:   Right lung mass with likely diffuse metastatic disease.  The pattern is more suggestive of carcinoma than lymphoma.  Cytology should be available today.  If this is inconclusive, plan for IR biopsy        Electronically signed by Jazmyn Valenzuela MD        [id number]     Addendum: Biopsy performed by IRMarino Will sign off      HOSPITALIST PROGRESS NOTE;   Chief Complaint: patient presented from from senior care with c/o weight loss, right upper extremity, right chest swelling.  Also reports dyspnea ongoing for several months.  He also had chronic/worsening chest pain       Cytology from pleural fluid with no malignant cells       Subjective:        Pain control better today. R arm edema persisting. Minnie Roy continues to feel short of breath    Intake/Output Summary (Last 24 hours) at 12/29/2020 1055   Last data filed at 12/28/2020 2041   Gross per 24 hour   Intake 955 ml   Output -   Net 955 ml           Physical Exam Performed:       BP (!) 139/91   Pulse 86   Temp 97 °F (36.1 °C) (Oral)   Resp 18   Ht 6' 1\" (1.854 m)   Wt 174 lb 3.2 oz (79 kg)   SpO2 94%   BMI 22.98 kg/m²        General appearance: No apparent distress appears stated age and cooperative. HEENT Normal cephalic, atraumatic without obvious deformity.  Pupils equal, round, and reactive to light.  Extra ocular muscles intact.  Conjunctivae/corneas clear. Neck: Supple, No jugular venous distention/bruits.  Trachea midline    Lungs: No rhonchi, no wheezing, no rales, diminished sounds - worse on the right.    Heart: Regular rate and rhythm with Normal S1/S2 without murmurs, rubs or gallops   Abdomen: Soft, non-tender or non-distended without rigidity or guarding and positive bowel sounds all four quadrants. Extremities: RUE edema.    Skin: Skin color, texture, turgor normal.  No rashes or lesions. Neurologic: Alert and oriented X 3, neurovascularly intact with sensory/motor intact upper extremities/lower extremities, bilaterally.  Cranial nerves: II-XII intact, grossly non-focal.   Mental status: Alert, oriented, thought content appropriate.    Capillary Refill: Acceptable  < 3 seconds   Peripheral Pulses: +3 Easily felt, not easily obliterated with pressure             Humberto Meeks reviewed the chart on Jessica Jackson and personally interviewed and examined patient, reviewed the data (labs and imaging) and after discussion with my PA formulated the plan. Jake Sosa with note with the following edits.       HPI:        I reviewed the patient's Past Medical History, Past Surgical History, Medications, and Allergies.            Edema of right upper ext better    Ambulating in room on RA    Reports pain issues           General: middle aged male, Phylliss Rave, alert and oriented. Appears to be not in any distress   Mucous Membranes:  Pink , anicteric   Neck: No JVD, no carotid bruit, no thyromegaly   Chest: diminished in right mid and bases, clear on left side   Cardiovascular:  RRR S1S2 heard, no murmurs or gallops   Abdomen:  Soft, undistended, non tender, no organomegaly, BS present   Extremities: right UE 1 + edema  Distal pulses well felt   Neurological : grossly normal                               Labs:    Recent Labs     12/27/20   0646 12/28/20 0417 12/29/20   0518   WBC 14.7* 17.9* 12.3*   HGB 12.2* 13.0* 12.5*   HCT 37.1* 39.7* 37.8*    481* 420       Recent Labs     12/28/20   0226 12/28/20 0417 12/29/20   0518   * 132*  131* 128*   K 4.3 4.6  4.8 4.3  4.3   CL 93* 96*  96* 93*   CO2 26 25  26 27   BUN 12 11  11 11   CREATININE <0.5* <0.5*  <0.5* <0.5*   CALCIUM 8.9 8.8  8.9 8.7   PHOS -- -- 3.7       Recent Labs     12/27/20   0646 12/28/20 0417 12/29/20   0518   AST 7* 8* 6*   ALT 9* 9* 7*   BILITOT <0.2 <0.2 <0.2   ALKPHOS 101 93 77       No results for input(s): INR in the last 72 hours.    No results for input(s): Johnson Scrivener in the last 72 hours.       Urinalysis:        Lab Results   Component Value Date     NITRU Negative 12/24/2020     BLOODU Negative 12/24/2020     SPECGRAV 1.025 12/24/2020     GLUCOSEU Negative 12/24/2020       Cultures   Blood: NGTD   Pleural fluid: NGTD   COVID: not detected       Radiology: US THORACENTESIS Which side should the procedure be performed? Radiologist Recommendation   Final Result   Successful ultrasound guided right thoracentesis.         XR CHEST PORTABLE   Final Result   No definite pneumothorax on the right status post thoracentesis       Complete opacification of the right hemithorax, due to pleuroparenchymal   disease           CT ABDOMEN PELVIS W IV CONTRAST Additional Contrast? None   Final Result   Chest:       Small pulmonary emboli left lower lobe       Marked abnormal mediastinal adenopathy, with compression and obscuration of   the airways on the right.  There is collapse and consolidation seen of the   right upper, right middle lobe, and right lower lobe.  There is abnormal   pleural thickening on the right, suspicious for pleural implants, and a large   loculated pleural effusion on the right.       Metastatic adenopathy seen in the right axillary region, and supraclavicular   region.       Abdomen pelvis:       There is widespread infiltrative nodular soft tissue density throughout the   abdomen and pelvis, compatible with sequelae of peritoneal carcinomatosis. There is abnormal adenopathy in the gastrohepatic ligament, and   retroperitoneum along with mild splenomegaly       Ill-defined hypodense nodule in the liver and spleen, possibly metastatic foci       Results discussed with Joan Burrell by Surendra Smiley.  Huber Pimentel MD at 2:23 pm on   12/24/2020           CT CHEST PULMONARY EMBOLISM W CONTRAST   Final Result   Chest:       Small pulmonary emboli left lower lobe       Marked abnormal mediastinal adenopathy, with compression and obscuration of   the airways on the right.  There is collapse and consolidation seen of the   right upper, right middle lobe, and right lower lobe.  There is abnormal   pleural thickening on the right, suspicious for pleural implants, and a large   loculated pleural effusion on the right.     Metastatic adenopathy seen in the right axillary region, and supraclavicular   region.       Abdomen pelvis:       There is widespread infiltrative nodular soft tissue density throughout the   abdomen and pelvis, compatible with sequelae of peritoneal carcinomatosis. There is abnormal adenopathy in the gastrohepatic ligament, and   retroperitoneum along with mild splenomegaly       Ill-defined hypodense nodule in the liver and spleen, possibly metastatic foci       Results discussed with Prashanth Talley by Sylvia Mina. Matt Wynn MD at 2:23 pm on   12/24/2020               Pathology   Right Side Pleural Fluid:       - Negative for malignancy.       - Benign mesothelial cells and chronic inflammatory cells.            Assessment/Plan:       Active Hospital Problems     Diagnosis   · Malignant pleural effusion [J91.0]   · Malignant neoplasm of lung (HCC) [C34.90]   · Axillary lymphadenopathy [R59.0]   · Mediastinal mass [J98.59]   · SVC syndrome [I87.1]   · Single subsegmental pulmonary embolism without acute cor pulmonale (HCC) [I26.93]           Abnormal CT Chest and Abdomen   Highly concerning for wide spread malignancy with carcinomatosis   - right hilar mass with compression of right sided airways and right lung collapse, pleural implants, axillary , supraclavicular LN        - primary unknown - possibly lung vs lymphoma    HemOnc and Pulm involved. S/p thoracentesis - Rt loculated pleural effusion - 800cc of fluid taken - cytology negative. Patient having lymph node biopsy today.        Acute PE   Likely secondary to malignancy. On heparin gtt - to lovenox - access problems. - plan NOAC on discharge            Chest Pain - he likely has widely metastatic Ca. Multi focal pain - mostly in R chest and chest wall. I suspect bone involvement with cancer. Added oxy ER and continue percocet for break through symptoms. Avoid IV opiate as able. [facility] has not received IV morphine in the last couple days.        Right UE edema and dyspnea    - sec to compressive symptoms- likely SVC syndrome   - on steroids per Pulm    - remains on RA        Constipation - add on bowel regimen today. Consider Relistor       DVT Prophylaxis: on heparin gtt to lovenox. Diet: NPO for biopsy. Code Status: Full Code       Dispo - cc.        Komal Alvarez FNP-C   12/29/2020        Agree with above   Changes made to note       PULMONOLOGY PROGRESS NOTE;   CC: Pleural effusion       Subjective:    Patient had biopsy of the right axillary lymph node this afternoon.  He remains short of breath with any exertion.           Intake/Output Summary (Last 24 hours) at 12/29/2020 1535   Last data filed at 12/29/2020 1245   Gross per 24 hour   Intake 475 ml   Output -   Net 475 ml           Exam:    BP (!) 143/95   Pulse 102   Temp 98.5 °F (36.9 °C) (Oral)   Resp 18   Ht 6' 1\" (1.854 m)   Wt 174 lb 3.2 oz (79 kg)   SpO2 95%   BMI 22.98 kg/m²  on room air   Gen: No Distress.  Ill-appearing   Eyes: PERRL. No sclera icterus. No conjunctival injection. ENT: No discharge. Pharynx clear. Neck: Trachea midline. No obvious mass.     Resp:  No Accessory muscle use. No crackles. No wheezes.  Few rhonchi. R dullness on percussion. CV: Regular rate. Regular rhythm. No murmur or rub.     GI: Non-tender. Non-distended. No hernia. Skin: Warm and dry. No nodule on exposed extremities. Lymph: No cervical LAD. R supraclavicular LAD.  Right axillary lymphadenopathy     M/S: No cyanosis. No joint deformity. No clubbing. Neuro: Awake. Alert. Moves all four extremities. Psych: Oriented x 3.  No anxiety.        Labs:   CBC:    Recent Labs     12/27/20   0646 12/28/20   0417 12/29/20   0518   WBC 14.7* 17.9* 12.3*   HGB 12.2* 13.0* 12.5*   HCT 37.1* 39.7* 37.8*   MCV 78.5* 78.6* 79.4*    481* 420       BMP:    Recent Labs     12/28/20   0226 12/28/20 0417 12/29/20 0518   * 132*  131* 128*   K 4.3 4.6  4.8 4.3  4.3   CL 93* 96*  96* 93*   CO2 26 25  26 27   PHOS -- -- 3.7   BUN 12 11  11 11   CREATININE <0.5* <0.5*  <0.5* <0.5*       LIVER PROFILE:    Recent Labs     12/27/20 0646 12/28/20 0417 12/29/20 0518   AST 7* 8* 6*   ALT 9* 9* 7*   BILITOT <0.2 <0.2 <0.2   ALKPHOS 101 93 77       PT/INR: No results for input(s): PROTIME, INR in the last 72 hours. APTT:    Recent Labs     12/27/20 0646 12/28/20 0417 12/29/20 0518   APTT 33.8 34.5 33.7       UA:   No results for input(s): NITRITE, COLORU, PHUR, LABCAST, WBCUA, RBCUA, MUCUS, TRICHOMONAS, YEAST, BACTERIA, CLARITYU, SPECGRAV, LEUKOCYTESUR, UROBILINOGEN, BILIRUBINUR, BLOODU, GLUCOSEU, AMORPHOUS in the last 72 hours.       Invalid input(s): KETONESU   No results for input(s): PHART, GKT3YZP, PO2ART in the last 72 hours. Cultures:    12/24 BC NGTD   12/24 pleural fluid no organisms       Films:   CT chest 12/24 imaging was reviewed by me and showed    Small pulmonary emboli left lower lobe    Marked abnormal mediastinal adenopathy, with compression and obscuration of the airways on the right.  There is collapse and consolidation seen of the right upper, right middle lobe, and right lower lobe.  There is abnormal pleural thickening on the right, suspicious for pleural implants, and a large loculated pleural effusion on the right. Metastatic adenopathy seen in the right axillary region, and supraclavicular    region.                ASSESSMENT:   · Acute PE   · Probable SVC syndrome   · Mediastinal/right hilar mass compressing right-sided airways with right lung collapse, pleural implants, axillary/supraclavicular lymphadenopathy, diffuse infiltrative nodularities throughout the peritoneum.  Lymphoma versus metastatic bronchogenic carcinoma   · Right-sided pleural effusion-likely malignant effusion.  Post thoracentesis 12/24 800 mL bloody   · Weight loss       PLAN: · Supplemental oxygen to maintain SaO2 >92%; wean as tolerated   · Follow-up pleural fluid cytology- negative   · S/p IR core biopsy of axillary LN   · Lovenox therapeutic dose   · Decadron 4 mg IV every 6 hours   · Might require Pleurx catheter placement   · Repeat chest x-ray tomorrow      US BIOPSY LYMPH NODE; Successful ultrasound guided core biopsy of a right axillary lymph node.       ACTIVE MEDS; · polyethylene glycol 17 g Oral Daily   · sennosides-docusate sodium 2 tablet Oral Daily   · oxyCODONE 10 mg Oral 2 times per day   · enoxaparin 1 mg/kg Subcutaneous BID   · dexamethasone 4 mg Intravenous Q6H   · sodium chloride flush 10 mL Intravenous 2 times per day   Percocet 7.5-325mg po prn x2                Pleural Effusion - Care Day 5 (12/28/2020) by Padmini France RN       Review Status Review Entered   Completed 12/30/2020 10:00      Criteria Review      Care Day: 5 Care Date: 12/28/2020 Level of Care: Intermediate Care    Guideline Day 3    Clinical Status    ( ) * Hemodynamic stability    ( ) * CXR or ultrasound shows stability or improvement    ( ) * Infection absent or outpatient treatment planned    (X) * Tachypnea absent    12/30/2020 10:00 AM EST by Kylie Kohli      Resp 18    ( ) * Hypoxemia absent    ( ) * Pain absent or managed    ( ) * Discharge plans and education understood    Activity    ( ) * Ambulatory or acceptable for next level of care    Routes    (X) * Oral hydration, medications, and diet    12/30/2020 10:00 AM EST by Kylie Kohli      noted    Interventions    (X) * Oxygen absent or at baseline need    ( ) * Chest tube absent or outpatient care arranged    * Milestone   Additional Notes   12/28/20:  HEMATOLOGY PROGRESS NOTE;   PROBLEM LIST:         Patient Active Problem List   Diagnosis Code   · Acute pulmonary embolism (HCC) I26.99   · Axillary lymphadenopathy R59.0   · Mediastinal mass J98.59   · SVC syndrome I87.1           INTERVAL HISTORY:       Arm pain stable. Still SOB.         REVIEW OF SYSTEMS:         10 point ROS completed.  Pertinent positives in HPI, otherwise negative.        PHYSICAL EXAM:         Vitals:     12/28/20 0228   BP: (!) 140/91   Pulse: 88   Resp: 18   Temp: 98 °F (36.7 °C)   SpO2: 97%           General appearance: alert and cooperative   Head: Normocephalic, without obvious abnormality, atraumatic   Neck: No palpable lymphadenopathy in supraclavicular or cervical chains   Lungs: Clear to auscultation bilaterally, no audible rales, wheezes or crackles   Heart: Regular rate and rhythm, S1, S2 normal   Abdomen: Soft, non-tender; bowel sounds normal; no masses,  no organomegaly   Extremities: without cyanosis, clubbing, edema or asymmetry   Skin: No jaundice, purpura or petechiae           LABS:       Lab Results   Component Value Date     WBC 17.9 (H) 12/28/2020     HGB 13.0 (L) 12/28/2020     HCT 39.7 (L) 12/28/2020     MCV 78.6 (L) 12/28/2020      (H) 12/28/2020           Lab Results   Component Value Date     GLUCOSE 112 (H) 12/28/2020     GLUCOSE 110 (H) 12/28/2020     BUN 11 12/28/2020     BUN 11 12/28/2020     CREATININE <0.5 (L) 12/28/2020     CREATININE <0.5 (L) 12/28/2020     K 4.8 12/28/2020     K 4.6 12/28/2020           Lab Results   Component Value Date     ALKPHOS 93 12/28/2020     ALT 9 (L) 12/28/2020     AST 8 (L) 12/28/2020     BILITOT <0.2 12/28/2020     PROT 6.1 (L) 12/28/2020           IMAGING:       Ct Abdomen Pelvis W Iv Contrast Additional Contrast? None   Result Date: 12/24/2020 Chest: Small pulmonary emboli left lower lobe Marked abnormal mediastinal adenopathy, with compression and obscuration of the airways on the right.  There is collapse and consolidation seen of the right upper, right middle lobe, and right lower lobe.  There is abnormal pleural thickening on the right, suspicious for pleural implants, and a large loculated pleural effusion on the right. Metastatic adenopathy seen in the right axillary region, and supraclavicular region. Abdomen pelvis: There is widespread infiltrative nodular soft tissue density throughout the abdomen and pelvis, compatible with sequelae of peritoneal carcinomatosis. There is abnormal adenopathy in the gastrohepatic ligament, and retroperitoneum along with mild splenomegaly Ill-defined hypodense nodule in the liver and spleen, possibly metastatic foci Results discussed with Raffy Martin by Linda Hardy. Taurus Adkins MD at 2:23 pm on 12/24/2020        Ct Chest Pulmonary Embolism W Contrast   Result Date: 12/24/2020   Chest: Small pulmonary emboli left lower lobe Marked abnormal mediastinal adenopathy, with compression and obscuration of the airways on the right.  There is collapse and consolidation seen of the right upper, right middle lobe, and right lower lobe.  There is abnormal pleural thickening on the right, suspicious for pleural implants, and a large loculated pleural effusion on the right. Metastatic adenopathy seen in the right axillary region, and supraclavicular region. Abdomen pelvis: There is widespread infiltrative nodular soft tissue density throughout the abdomen and pelvis, compatible with sequelae of peritoneal carcinomatosis. There is abnormal adenopathy in the gastrohepatic ligament, and retroperitoneum along with mild splenomegaly Ill-defined hypodense nodule in the liver and spleen, possibly metastatic foci Results discussed with Raffy Martin by Linda Hardy.  Taurus Adkins MD at 2:23 pm on 12/24/2020            ASSESSMENT/PLAN:     1. CT Scan Suspicious for Malignancy       - CT CAP 12/24/2020 with mediastinal LAD with airway compression, R lung consolidation and collapse, pleural implants, pleural effusion, axillary and supraclavicular LAD, peritoneal carcinomatosis and possible liver and spleen metastasis   - CT scan highly suspicious for malignancy   - awaiting pleural fluid cytology    - surgery consulted for possible axillary LN biopsy - will plan biopsy if cytology not diagnostic   - further recommendations to follow once diagnosis established       2. Pulmonary Embolism       - risk factor is suspected malignancy   - Lovenox for now    - can transition to NOAC at discharge       3. Pleural Effusion       - suspect malignant   - chest tube placed in ER   - awaiting cytology       Milka Worthington MD      HOSPITALIST Pavan Sanchez;   Chief Complaint: patient presented from from skilled nursing with c/o weight loss, right upper extremity, right chest swelling.  Also reports dyspnea ongoing for several months.  He also had chronic/worsening chest pain           Subjective:        Pain control better today. R arm edema persisting. Karlene Garcia continues to feel short of breath       Constipated on opiate pain control. Intake/Output Summary (Last 24 hours) at 12/28/2020 1003   Last data filed at 12/28/2020 0912   Gross per 24 hour   Intake 1417 ml   Output 300 ml   Net 1117 ml           Physical Exam Performed:       BP (!) 141/87   Pulse 101   Temp 96.2 °F (35.7 °C) (Oral)   Resp 18   Ht 6' 1\" (1.854 m)   Wt 180 lb 9.6 oz (81.9 kg)   SpO2 94%   BMI 23.83 kg/m²        General appearance: No apparent distress appears stated age and cooperative. HEENT Normal cephalic, atraumatic without obvious deformity.  Pupils equal, round, and reactive to light.  Extra ocular muscles intact.  Conjunctivae/corneas clear.    Neck: Supple, No jugular venous distention/bruits.  Trachea midline Lungs: No rhonchi, no wheezing, no rales, diminished sounds - worse on the right.    Heart: Regular rate and rhythm with Normal S1/S2 without murmurs, rubs or gallops   Abdomen: Soft, non-tender or non-distended without rigidity or guarding and positive bowel sounds all four quadrants. Extremities: RUE edema.    Skin: Skin color, texture, turgor normal.  No rashes or lesions. Neurologic: Alert and oriented X 3, neurovascularly intact with sensory/motor intact upper extremities/lower extremities, bilaterally.  Cranial nerves: II-XII intact, grossly non-focal.   Mental status: Alert, oriented, thought content appropriate. Capillary Refill: Acceptable  < 3 seconds   Peripheral Pulses: +3 Easily felt, not easily obliterated with pressure               I Cesar Marshall have reviewed the chart on Joshua Maradiaga and personally interviewed and examined patient, reviewed the data (labs and imaging) and after discussion with my PA formulated the plan.  Agree with note with the following edits.       HPI:        I reviewed the patient's Past Medical History, Past Surgical History, Medications, and Allergies.            Edema of right upper ext better           General:  Awake, alert and oriented.  Appears to be not in any distress   Mucous Membranes:  Pink , anicteric   Neck: No JVD, no carotid bruit, no thyromegaly   Chest: diminished in right mid and bases, clear on left side   Cardiovascular:  RRR S1S2 heard, no murmurs or gallops   Abdomen:  Soft, undistended, non tender, no organomegaly, BS present   Extremities: right UE 1 + edema  Distal pulses well felt   Neurological : grossly normal                               Labs:    Recent Labs     12/26/20   0516 12/27/20   0646 12/28/20   0417   WBC 16.7* 14.7* 17.9*   HGB 13.4* 12.2* 13.0*   HCT 41.1 37.1* 39.7*    413 481*       Recent Labs     12/27/20   1935 12/28/20   0226 12/28/20   0417   * 128* 132*  131*   K 4.7 4.3 4.6  4.8 CL 95* 93* 96*  96*   CO2 27 26 25  26   BUN 14 12 11  11   CREATININE <0.5* <0.5* <0.5*  <0.5*   CALCIUM 8.6 8.9 8.8  8.9       Recent Labs     12/26/20   0516 12/27/20   0646 12/28/20   0417   AST 10* 7* 8*   ALT 10 9* 9*   BILITOT 0.3 <0.2 <0.2   ALKPHOS 113 101 93       No results for input(s): INR in the last 72 hours. No results for input(s): Jocelynn [place] in the last 72 hours.       Urinalysis:        Lab Results   Component Value Date     NITRU Negative 12/24/2020     BLOODU Negative 12/24/2020     SPECGRAV 1.025 12/24/2020     GLUCOSEU Negative 12/24/2020       Cultures   Blood: NGTD   Pleural fluid: NGTD   COVID: not detected       Radiology:   US THORACENTESIS Which side should the procedure be performed? Radiologist Recommendation   Final Result   Successful ultrasound guided right thoracentesis.         XR CHEST PORTABLE   Final Result   No definite pneumothorax on the right status post thoracentesis       Complete opacification of the right hemithorax, due to pleuroparenchymal   disease           CT ABDOMEN PELVIS W IV CONTRAST Additional Contrast? None   Final Result   Chest:       Small pulmonary emboli left lower lobe       Marked abnormal mediastinal adenopathy, with compression and obscuration of   the airways on the right.  There is collapse and consolidation seen of the   right upper, right middle lobe, and right lower lobe.  There is abnormal   pleural thickening on the right, suspicious for pleural implants, and a large   loculated pleural effusion on the right.       Metastatic adenopathy seen in the right axillary region, and supraclavicular   region.       Abdomen pelvis:       There is widespread infiltrative nodular soft tissue density throughout the   abdomen and pelvis, compatible with sequelae of peritoneal carcinomatosis.    There is abnormal adenopathy in the gastrohepatic ligament, and   retroperitoneum along with mild splenomegaly     Ill-defined hypodense nodule in the liver and spleen, possibly metastatic foci       Results discussed with Rayna Jang by Sandrita Glasgow. Estefania Arreguin MD at 2:23 pm on   12/24/2020           CT CHEST PULMONARY EMBOLISM W CONTRAST   Final Result   Chest:       Small pulmonary emboli left lower lobe       Marked abnormal mediastinal adenopathy, with compression and obscuration of   the airways on the right.  There is collapse and consolidation seen of the   right upper, right middle lobe, and right lower lobe.  There is abnormal   pleural thickening on the right, suspicious for pleural implants, and a large   loculated pleural effusion on the right.       Metastatic adenopathy seen in the right axillary region, and supraclavicular   region.       Abdomen pelvis:       There is widespread infiltrative nodular soft tissue density throughout the   abdomen and pelvis, compatible with sequelae of peritoneal carcinomatosis. There is abnormal adenopathy in the gastrohepatic ligament, and   retroperitoneum along with mild splenomegaly       Ill-defined hypodense nodule in the liver and spleen, possibly metastatic foci       Results discussed with Rayna Jang by Sandrita Glasgow. Estefania Arreguin MD at 2:23 pm on   12/24/2020                           Assessment/Plan:       Active Hospital Problems     Diagnosis   · Axillary lymphadenopathy [R59.0]   · Mediastinal mass [J98.59]   · SVC syndrome [I87.1]   · Acute pulmonary embolism (HCC) [I26.99]           Abnormal CT Chest and Abdomen   Highly concerning for wide spread malignancy with carcinomatosis   - right hilar mass with compression of right sided airways and right lung collapse, pleural implants, axillary , supraclavicular LN        - primary unknown - possibly lung vs lymphoma    HemOnc and Pulm involved.     S/p thoracentesis - Rt loculated pleural effusion - 800cc of fluid taken - cytology pending.     if no results , can do axillary LN bx        Acute PE Likely secondary to malignancy. On heparin gtt - to lovenox - access problems. - plan NOAC on discharge            Chest Pain - he likely has widely metastatic Ca. Multi focal pain - mostly in R chest and chest wall. I suspect bone involvement with cancer. Added oxy ER and continue percocet for break through symptoms. Avoid IV opiate as able. Elda  has not received IV morphine in the last couple days.        Right UE edema and dyspnea    - sec to compressive symptoms- likely SVC syndrome   - on steroids per Pulm    - remains on RA             Constipation - add on bowel regimen today. Consider Relistor           DVT Prophylaxis: on heparin gtt to lovenox. Diet: DIET GENERAL;   Code Status: Full Code           Dispo - cc.        Pilar Ortiz FNP-C   12/28/2020        Agree with above   Changes made to note      PULMONOLOGY PROGRESS NOTE;   Subjective:    Patient feels about the same           Intake/Output Summary (Last 24 hours) at 12/28/2020 1310   Last data filed at 12/28/2020 0912   Gross per 24 hour   Intake 1417 ml   Output 300 ml   Net 1117 ml           Exam:    BP (!) 141/87   Pulse 101   Temp 96.2 °F (35.7 °C) (Oral)   Resp 18   Ht 6' 1\" (1.854 m)   Wt 180 lb 9.6 oz (81.9 kg)   SpO2 94%   BMI 23.83 kg/m²  on room air   Gen: No Distress.  Ill-appearing   Eyes: PERRL. No sclera icterus. No conjunctival injection. ENT: No discharge. Pharynx clear. Neck: Trachea midline. No obvious mass.     Resp:  No Accessory muscle use. No crackles. No wheezes.  Few rhonchi. R dullness on percussion. CV: Regular rate. Regular rhythm. No murmur or rub.     GI: Non-tender. Non-distended. No hernia. Skin: Warm and dry. No nodule on exposed extremities. Lymph: No cervical LAD. R supraclavicular LAD.  Right axillary lymphadenopathy     M/S: No cyanosis. No joint deformity. No clubbing. Neuro: Awake. Alert. Moves all four extremities. Psych: Oriented x 3.  No anxiety.     Labs: CBC:    Recent Labs     12/26/20   0516 12/27/20   0646 12/28/20   0417   WBC 16.7* 14.7* 17.9*   HGB 13.4* 12.2* 13.0*   HCT 41.1 37.1* 39.7*   MCV 79.8* 78.5* 78.6*    413 481*       BMP:    Recent Labs     12/27/20   1935 12/28/20   0226 12/28/20 0417   * 128* 132*  131*   K 4.7 4.3 4.6  4.8   CL 95* 93* 96*  96*   CO2 27 26 25  26   BUN 14 12 11  11   CREATININE <0.5* <0.5* <0.5*  <0.5*       LIVER PROFILE:    Recent Labs     12/26/20   0516 12/27/20   0646 12/28/20 0417   AST 10* 7* 8*   ALT 10 9* 9*   BILITOT 0.3 <0.2 <0.2   ALKPHOS 113 101 93       PT/INR: No results for input(s): PROTIME, INR in the last 72 hours. APTT:    Recent Labs     12/26/20   1029 12/27/20   0646 12/28/20 0417   APTT 36.7* 33.8 34.5       UA:   No results for input(s): NITRITE, COLORU, PHUR, LABCAST, WBCUA, RBCUA, MUCUS, TRICHOMONAS, YEAST, BACTERIA, CLARITYU, SPECGRAV, LEUKOCYTESUR, UROBILINOGEN, BILIRUBINUR, BLOODU, GLUCOSEU, AMORPHOUS in the last 72 hours.       Invalid input(s): KETONESU   No results for input(s): PHART, TTT8AMD, PO2ART in the last 72 hours. Cultures:    12/24 BC NGTD   12/24 pleural fluid no organisms       Films:   CT chest 12/24 imaging was reviewed by me and showed    Small pulmonary emboli left lower lobe    Marked abnormal mediastinal adenopathy, with compression and obscuration of the airways on the right.  There is collapse and consolidation seen of the right upper, right middle lobe, and right lower lobe.  There is abnormal pleural thickening on the right, suspicious for pleural implants, and a large loculated pleural effusion on the right.     Metastatic adenopathy seen in the right axillary region, and supraclavicular    region.                ASSESSMENT:   · Acute PE   · Probable SVC syndrome · Mediastinal/right hilar mass compressing right-sided airways with right lung collapse, pleural implants, axillary/supraclavicular lymphadenopathy, diffuse infiltrative nodularities throughout the peritoneum.  Lymphoma versus metastatic bronchogenic carcinoma   · Right-sided pleural effusion-likely malignant effusion. Post thoracentesis 12/24 800 mL bloody   · Weight loss       PLAN:   · Supplemental oxygen to maintain SaO2 >92%; wean as tolerated   · Follow-up pleural fluid cytology   · Right axillary mass will have the highest diagnostic/staging yield- Dr. Stephanie Sands evaluated patient    · Lovenox therapeutic dose   · Decadron 4 mg IV every 6 hours   · Might require Pleurx catheter placement      NEPHROLOGY PROGRESS  NOTE;   Subjective/   60y.o. year old male who we are seeing in consultation for Hyponatremia.        HPI:       He is constipated so not eating or drinking much   Kidney function normal   Sodium is up to 132   Passing gas        ROS:   +R arm pain.    +LUIS.       Objective/   GEN:  Chronically ill, /83   Pulse 89   Temp 96.8 °F (36 °C) (Oral)   Resp 18   Ht 6' 1\" (1.854 m)   Wt 180 lb 9.6 oz (81.9 kg)   SpO2 98%   BMI 23.83 kg/m²    HEENT: non-icteric, no JVD   CV: S1, S2 without m/r/g; no LE edema   RESP: CTA B without w/r/r; breathing wnl   ABD: +bs, soft, nt, no hsm   SKIN: warm, no rashes       Data/   Recent Labs     12/26/20   0516 12/27/20   0646 12/28/20   0417   WBC 16.7* 14.7* 17.9*   HGB 13.4* 12.2* 13.0*   HCT 41.1 37.1* 39.7*   MCV 79.8* 78.5* 78.6*    413 481*       Recent Labs     12/27/20   1935 12/28/20   0226 12/28/20   0417   * 128* 132*  131*   K 4.7 4.3 4.6  4.8   CL 95* 93* 96*  96*   CO2 27 26 25  26   GLUCOSE 129* 118* 112*  110*   BUN 14 12 11  11   CREATININE <0.5* <0.5* <0.5*  <0.5*   LABGLOM >60 >60 >60  >60   GFRAA >60 >60 >60  >60           Assessment/       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

## 2020-12-30 NOTE — FLOWSHEET NOTE
12/30/20 0845   Vital Signs   Temp 97 °F (36.1 °C)   Pulse 93   Heart Rate Source Brachial   Resp 16   /86   BP Location Left upper arm   Patient Position Semi fowlers   Level of Consciousness Alert (0)   MEWS Score 1   Pain Assessment   Pain Assessment 0-10   Pain Level 8   Pain Type Acute pain   Pain Location Back  (and Chest)   Oxygen Therapy   SpO2 93 %   O2 Device None (Room air)     Pt resting in bed. Pt states he has been having 9/10 back, and chest discomfort. Pt given his scheduled Oxycontin 10mg po. Pt NPO for possible Pleurex Drain placement. Lovenox is on Hold for now.

## 2020-12-30 NOTE — PROGRESS NOTES
CO2 25  26 27 26   PHOS  --  3.7  --    BUN 11  11 11 14   CREATININE <0.5*  <0.5* <0.5* <0.5*     LIVER PROFILE:   Recent Labs     12/28/20 0417 12/29/20 0518 12/30/20  0356   AST 8* 6* 7*   ALT 9* 7* 9*   BILITOT <0.2 <0.2 <0.2   ALKPHOS 93 77 74     PT/INR: No results for input(s): PROTIME, INR in the last 72 hours. APTT:   Recent Labs     12/28/20 0417 12/29/20 0518   APTT 34.5 33.7     UA:  No results for input(s): NITRITE, COLORU, PHUR, LABCAST, WBCUA, RBCUA, MUCUS, TRICHOMONAS, YEAST, BACTERIA, CLARITYU, SPECGRAV, LEUKOCYTESUR, UROBILINOGEN, BILIRUBINUR, BLOODU, GLUCOSEU, AMORPHOUS in the last 72 hours. Invalid input(s): KETONESU  No results for input(s): PHART, CKU6SLN, PO2ART in the last 72 hours. Cultures:   12/24 BC NGTD  12/24 pleural fluid no organisms    pleural fluid cytology- negative    Films:    CXR 12/30: Heart size is indeterminate.  The right hemithorax is completely opacified  and unchanged.  Very large right pleural effusion with atelectasis and  collapse of the right lung present.  Mild pulmonary vascular congestion  present within the left lung.  No acute airspace disease.  No left pleural  effusion. CT chest 12/24 imaging was reviewed by me and showed   Small pulmonary emboli left lower lobe   Marked abnormal mediastinal adenopathy, with compression and obscuration of the airways on the right.  There is collapse and consolidation seen of the right upper, right middle lobe, and right lower lobe.  There is abnormal pleural thickening on the right, suspicious for pleural implants, and a large loculated pleural effusion on the right.    Metastatic adenopathy seen in the right axillary region, and supraclavicular   region.            ASSESSMENT:  · Acute PE  · Probable SVC syndrome · Mediastinal/right hilar mass compressing right-sided airways with right lung collapse, pleural implants, axillary/supraclavicular lymphadenopathy, diffuse infiltrative nodularities throughout the peritoneum. Lymphoma versus metastatic bronchogenic carcinoma  · Right-sided pleural effusion-likely malignant effusion.  Post thoracentesis 12/24 800 mL bloody  · Weight loss     PLAN:  · Supplemental oxygen to maintain SaO2 >92%; wean as tolerated  · S/p IR core biopsy of axillary LN  · Lovenox therapeutic dose  · Decadron 4 mg IV every 6 hours  · Might require Pleurx catheter placement

## 2020-12-30 NOTE — PROGRESS NOTES
ONCOLOGY FOLLOW-UP:         PROBLEM LIST:       Patient Active Problem List   Diagnosis Code    Single subsegmental pulmonary embolism without acute cor pulmonale (HCC) I26.93    Axillary lymphadenopathy R59.0    Mediastinal mass J98.59    SVC syndrome I87.1    Malignant pleural effusion J91.0    Malignant neoplasm of lung (HCC) C34.90       INTERVAL HISTORY:       The patient is moderately short of breath, but states he is improved. REVIEW OF SYSTEMS:       10 point ROS completed. Pertinent positives in HPI, otherwise negative. PHYSICAL EXAM:       Vitals:    12/30/20 0245   BP: (!) 141/92   Pulse: 103   Resp: 18   Temp: 97.2 °F (36.2 °C)   SpO2: 96%       General appearance: alert and cooperative  Head: Normocephalic, without obvious abnormality, atraumatic  Neck: No palpable lymphadenopathy in supraclavicular or cervical chains  Lungs: There are decreased breath sounds bilaterally. Breath sounds are worse on the right than the left.   Heart: Regular rate and rhythm, S1, S2 normal  Abdomen: Soft, non-tender; bowel sounds normal; no masses,  no organomegaly  Extremities: without cyanosis, clubbing, edema or asymmetry  Skin: No jaundice, purpura or petechiae      LABS:     Lab Results   Component Value Date    WBC 15.5 (H) 12/30/2020    HGB 12.6 (L) 12/30/2020    HCT 37.7 (L) 12/30/2020    MCV 77.7 (L) 12/30/2020     12/30/2020       Lab Results   Component Value Date    GLUCOSE 121 (H) 12/30/2020    BUN 14 12/30/2020    CREATININE <0.5 (L) 12/30/2020    K 3.6 12/30/2020    PHOS 3.7 12/29/2020       Lab Results   Component Value Date    ALKPHOS 74 12/30/2020    ALT 9 (L) 12/30/2020    AST 7 (L) 12/30/2020    BILITOT <0.2 12/30/2020    PROT 5.6 (L) 12/30/2020       IMAGING:     Ct Abdomen Pelvis W Iv Contrast Additional Contrast? None  Result Date: 12/24/2020 Chest: Small pulmonary emboli left lower lobe Marked abnormal mediastinal adenopathy, with compression and obscuration of the airways on the right. There is collapse and consolidation seen of the right upper, right middle lobe, and right lower lobe. There is abnormal pleural thickening on the right, suspicious for pleural implants, and a large loculated pleural effusion on the right. Metastatic adenopathy seen in the right axillary region, and supraclavicular region. Abdomen pelvis: There is widespread infiltrative nodular soft tissue density throughout the abdomen and pelvis, compatible with sequelae of peritoneal carcinomatosis. There is abnormal adenopathy in the gastrohepatic ligament, and retroperitoneum along with mild splenomegaly Ill-defined hypodense nodule in the liver and spleen, possibly metastatic foci Results discussed with Kassy Share by Phoenix Liriano. Shawnee Thomas MD at 2:23 pm on 12/24/2020     Ct Chest Pulmonary Embolism W Contrast  Result Date: 12/24/2020  Chest: Small pulmonary emboli left lower lobe Marked abnormal mediastinal adenopathy, with compression and obscuration of the airways on the right. There is collapse and consolidation seen of the right upper, right middle lobe, and right lower lobe. There is abnormal pleural thickening on the right, suspicious for pleural implants, and a large loculated pleural effusion on the right. Metastatic adenopathy seen in the right axillary region, and supraclavicular region. Abdomen pelvis: There is widespread infiltrative nodular soft tissue density throughout the abdomen and pelvis, compatible with sequelae of peritoneal carcinomatosis. There is abnormal adenopathy in the gastrohepatic ligament, and retroperitoneum along with mild splenomegaly Ill-defined hypodense nodule in the liver and spleen, possibly metastatic foci Results discussed with Kassy Share by Phoenix Liriano.  Shawnee Thomas MD at 2:23 pm on 12/24/2020     PATHOLOGY: Pleural fluid cytology 12/24/2020: pending    ASSESSMENT/PLAN:     1. CT Scan Suspicious for Malignancy    - CT CAP 12/24/2020 with mediastinal LAD with airway compression, R lung consolidation and collapse, pleural implants, pleural effusion, axillary and supraclavicular LAD, peritoneal carcinomatosis and possible liver and spleen metastasis  - CT scan highly suspicious for malignancy  - Pleural fluid cytology was negative  -He had a lymph node biopsy on 12/29 which is pending    2. Pulmonary Embolism    - risk factor is suspected malignancy  - Lovenox for now   - can transition to NOAC at discharge    3.  Pleural Effusion    - suspect malignant  - thoracentesis done in ER  - Cytology was negative and now we are waiting on a lymph node biopsy    Deepali Deluna MD  May be reached through [address] [place] [address]

## 2020-12-30 NOTE — PROGRESS NOTES
Hospitalist Progress Note      PCP: No primary care provider on file. Date of Admission: 12/24/2020    Chief Complaint: patient presented from from custodial with c/o weight loss, right upper extremity, right chest swelling. Also reports dyspnea ongoing for several months. He also had chronic/worsening chest pain    Cytology from pleural fluid with no malignant cells   S;p right axillary LN bx    Subjective:     Pain control better today. Rt arm edema persisting. Remains on RA at rest  Path pending        Medications:  Reviewed    Infusion Medications     Scheduled Medications    polyethylene glycol  17 g Oral Daily    sennosides-docusate sodium  2 tablet Oral Daily    oxyCODONE  10 mg Oral 2 times per day    enoxaparin  1 mg/kg Subcutaneous BID    dexamethasone  4 mg Intravenous Q6H    sodium chloride flush  10 mL Intravenous 2 times per day     PRN Meds: calcium carbonate, oxyCODONE-acetaminophen, acetaminophen, sodium chloride flush, promethazine **OR** ondansetron, polyethylene glycol, acetaminophen **OR** acetaminophen, morphine      Intake/Output Summary (Last 24 hours) at 12/30/2020 0909  Last data filed at 12/29/2020 1747  Gross per 24 hour   Intake 840 ml   Output    Net 840 ml       Physical Exam Performed:    /86   Pulse 93   Temp 97 °F (36.1 °C)   Resp 16   Ht 6' 1\" (1.854 m)   Wt 169 lb 14.4 oz (77.1 kg)   SpO2 93%   BMI 22.42 kg/m²       General: middle aged male,  Awake, alert and oriented.  Appears to be not in any distress  Mucous Membranes:  Pink , anicteric  Neck: No JVD, no carotid bruit, no thyromegaly  Chest: diminished in right mid and bases, clear on left side  Cardiovascular:  RRR S1S2 heard, no murmurs or gallops  Abdomen:  Soft, undistended, non tender, no organomegaly, BS present  Extremities: right UE 2 + edema  Distal pulses well felt  Axillary dressing dry   Neurological : grossly normal                Labs:   Recent Labs     12/28/20  0417 12/29/20 pleural thickening on the right, suspicious for pleural implants, and a large   loculated pleural effusion on the right. Metastatic adenopathy seen in the right axillary region, and supraclavicular   region. Abdomen pelvis: There is widespread infiltrative nodular soft tissue density throughout the   abdomen and pelvis, compatible with sequelae of peritoneal carcinomatosis. There is abnormal adenopathy in the gastrohepatic ligament, and   retroperitoneum along with mild splenomegaly      Ill-defined hypodense nodule in the liver and spleen, possibly metastatic foci      Results discussed with Jin Jauregui by Kalli Walden. Adelaide Figueroa MD at 2:23 pm on   12/24/2020         CT CHEST PULMONARY EMBOLISM W CONTRAST   Final Result   Chest:      Small pulmonary emboli left lower lobe      Marked abnormal mediastinal adenopathy, with compression and obscuration of   the airways on the right. There is collapse and consolidation seen of the   right upper, right middle lobe, and right lower lobe. There is abnormal   pleural thickening on the right, suspicious for pleural implants, and a large   loculated pleural effusion on the right. Metastatic adenopathy seen in the right axillary region, and supraclavicular   region. Abdomen pelvis: There is widespread infiltrative nodular soft tissue density throughout the   abdomen and pelvis, compatible with sequelae of peritoneal carcinomatosis. There is abnormal adenopathy in the gastrohepatic ligament, and   retroperitoneum along with mild splenomegaly      Ill-defined hypodense nodule in the liver and spleen, possibly metastatic foci      Results discussed with Jin Jauregui by Kalli Walden. Adelaide Figueroa MD at 2:23 pm on   12/24/2020           Pathology  Right Side Pleural Fluid:      - Negative for malignancy.      - Benign mesothelial cells and chronic inflammatory cells.      Path from Axillary LN pending    Assessment/Plan:    Abnormal CT Chest and Abdomen Highly concerning for wide spread malignancy with carcinomatosis  - right hilar mass with compression of right sided airways and right lung collapse, pleural implants, axillary , supraclavicular LN     - primary unknown - possibly lung vs lymphoma   HemOnc and Pulm involved. S/p thoracentesis - Rt loculated pleural effusion - 800cc of fluid taken - cytology negative. Pt has axillary lymph node biopsy and pending  For right sided pleurix catheter today     Acute PE  Likely secondary to malignancy. On heparin gtt - to lovenox - access problems. - plan NOAC on discharge - eliquis        Chest Pain - he likely has widely metastatic Ca. Multi focal pain - mostly in R chest and chest wall. I suspect bone involvement with cancer. Added oxy ER and continue percocet for break through symptoms. Avoid IV opiate as able. He has not received IV morphine in the last couple days. Right UE edema and dyspnea   - sec to compressive symptoms- likely SVC syndrome  - on steroids per Pulm   - remains on RA      Constipation - add on bowel regimen r, resolved. DVT Prophylaxis: on heparin gtt to lovenox. Diet: NPO for pleurix catheter.    Code Status: Full Code     Dc planning        Joyce Keane MD 12/30/2020 9:09 AM

## 2020-12-30 NOTE — PROGRESS NOTES
Shift assessment complete. See flow sheet. Patient complains of 9/10 pain. Scheduled oxycontin provided with other meds. See MAR. Respirations easy and even on 2L O2 nasal canula. Pt states no additional needs at this time. Call light and bedside table in reach. Will follow.

## 2020-12-31 ENCOUNTER — TELEPHONE (OUTPATIENT)
Dept: PULMONOLOGY | Age: 55
End: 2020-12-31

## 2020-12-31 VITALS
RESPIRATION RATE: 18 BRPM | OXYGEN SATURATION: 96 % | SYSTOLIC BLOOD PRESSURE: 123 MMHG | BODY MASS INDEX: 22.12 KG/M2 | HEIGHT: 73 IN | DIASTOLIC BLOOD PRESSURE: 87 MMHG | HEART RATE: 95 BPM | WEIGHT: 166.9 LBS | TEMPERATURE: 96.6 F

## 2020-12-31 LAB
A/G RATIO: 1.1 (ref 1.1–2.2)
ALBUMIN SERPL-MCNC: 3.1 G/DL (ref 3.4–5)
ALP BLD-CCNC: 62 U/L (ref 40–129)
ALT SERPL-CCNC: 15 U/L (ref 10–40)
ANION GAP SERPL CALCULATED.3IONS-SCNC: 10 MMOL/L (ref 3–16)
AST SERPL-CCNC: 7 U/L (ref 15–37)
BASOPHILS ABSOLUTE: 0.1 K/UL (ref 0–0.2)
BASOPHILS RELATIVE PERCENT: 0.9 %
BILIRUB SERPL-MCNC: <0.2 MG/DL (ref 0–1)
BUN BLDV-MCNC: 12 MG/DL (ref 7–20)
CALCIUM SERPL-MCNC: 8.6 MG/DL (ref 8.3–10.6)
CHLORIDE BLD-SCNC: 97 MMOL/L (ref 99–110)
CO2: 27 MMOL/L (ref 21–32)
CREAT SERPL-MCNC: <0.5 MG/DL (ref 0.9–1.3)
EOSINOPHILS ABSOLUTE: 0 K/UL (ref 0–0.6)
EOSINOPHILS RELATIVE PERCENT: 0.1 %
GFR AFRICAN AMERICAN: >60
GFR NON-AFRICAN AMERICAN: >60
GLOBULIN: 2.7 G/DL
GLUCOSE BLD-MCNC: 125 MG/DL (ref 70–99)
HCT VFR BLD CALC: 38.5 % (ref 40.5–52.5)
HEMOGLOBIN: 12.8 G/DL (ref 13.5–17.5)
LYMPHOCYTES ABSOLUTE: 0.5 K/UL (ref 1–5.1)
LYMPHOCYTES RELATIVE PERCENT: 3.8 %
MCH RBC QN AUTO: 25.8 PG (ref 26–34)
MCHC RBC AUTO-ENTMCNC: 33.2 G/DL (ref 31–36)
MCV RBC AUTO: 77.6 FL (ref 80–100)
MONOCYTES ABSOLUTE: 0.6 K/UL (ref 0–1.3)
MONOCYTES RELATIVE PERCENT: 4.6 %
NEUTROPHILS ABSOLUTE: 12.3 K/UL (ref 1.7–7.7)
NEUTROPHILS RELATIVE PERCENT: 90.6 %
PDW BLD-RTO: 16.6 % (ref 12.4–15.4)
PHOSPHORUS: 3.8 MG/DL (ref 2.5–4.9)
PLATELET # BLD: 418 K/UL (ref 135–450)
PMV BLD AUTO: 6.3 FL (ref 5–10.5)
POTASSIUM REFLEX MAGNESIUM: 4.1 MMOL/L (ref 3.5–5.1)
POTASSIUM SERPL-SCNC: 4.1 MMOL/L (ref 3.5–5.1)
RBC # BLD: 4.97 M/UL (ref 4.2–5.9)
SODIUM BLD-SCNC: 134 MMOL/L (ref 136–145)
TOTAL PROTEIN: 5.8 G/DL (ref 6.4–8.2)
WBC # BLD: 13.6 K/UL (ref 4–11)

## 2020-12-31 PROCEDURE — 85025 COMPLETE CBC W/AUTO DIFF WBC: CPT

## 2020-12-31 PROCEDURE — 80053 COMPREHEN METABOLIC PANEL: CPT

## 2020-12-31 PROCEDURE — 6360000002 HC RX W HCPCS: Performed by: INTERNAL MEDICINE

## 2020-12-31 PROCEDURE — 6370000000 HC RX 637 (ALT 250 FOR IP): Performed by: INTERNAL MEDICINE

## 2020-12-31 PROCEDURE — 99238 HOSP IP/OBS DSCHRG MGMT 30/<: CPT | Performed by: INTERNAL MEDICINE

## 2020-12-31 PROCEDURE — 36415 COLL VENOUS BLD VENIPUNCTURE: CPT

## 2020-12-31 PROCEDURE — 99232 SBSQ HOSP IP/OBS MODERATE 35: CPT | Performed by: INTERNAL MEDICINE

## 2020-12-31 PROCEDURE — 2580000003 HC RX 258: Performed by: PHYSICIAN ASSISTANT

## 2020-12-31 RX ORDER — SENNA AND DOCUSATE SODIUM 50; 8.6 MG/1; MG/1
2 TABLET, FILM COATED ORAL DAILY
Qty: 30 TABLET | Refills: 0 | Status: SHIPPED | OUTPATIENT
Start: 2021-01-01

## 2020-12-31 RX ORDER — DOCUSATE SODIUM 100 MG/1
100 CAPSULE, LIQUID FILLED ORAL 2 TIMES DAILY
Qty: 60 CAPSULE | Refills: 0 | Status: SHIPPED
Start: 2020-12-31 | End: 2021-01-20 | Stop reason: ALTCHOICE

## 2020-12-31 RX ORDER — OXYCODONE AND ACETAMINOPHEN 7.5; 325 MG/1; MG/1
1 TABLET ORAL EVERY 6 HOURS PRN
Qty: 20 TABLET | Refills: 0 | Status: SHIPPED | OUTPATIENT
Start: 2020-12-31 | End: 2021-01-05

## 2020-12-31 RX ORDER — OXYCODONE HCL 10 MG/1
10 TABLET, FILM COATED, EXTENDED RELEASE ORAL EVERY 12 HOURS SCHEDULED
Qty: 20 TABLET | Refills: 0 | Status: SHIPPED | OUTPATIENT
Start: 2020-12-31 | End: 2021-01-10

## 2020-12-31 RX ORDER — TOLVAPTAN 15 MG/1
30 TABLET ORAL ONCE
Status: COMPLETED | OUTPATIENT
Start: 2020-12-31 | End: 2020-12-31

## 2020-12-31 RX ADMIN — OXYCODONE HYDROCHLORIDE AND ACETAMINOPHEN 1 TABLET: 7.5; 325 TABLET ORAL at 02:05

## 2020-12-31 RX ADMIN — Medication 10 ML: at 08:49

## 2020-12-31 RX ADMIN — OXYCODONE HYDROCHLORIDE 10 MG: 10 TABLET, FILM COATED, EXTENDED RELEASE ORAL at 08:48

## 2020-12-31 RX ADMIN — ENOXAPARIN SODIUM 80 MG: 80 INJECTION SUBCUTANEOUS at 08:47

## 2020-12-31 RX ADMIN — OXYCODONE HYDROCHLORIDE AND ACETAMINOPHEN 1 TABLET: 7.5; 325 TABLET ORAL at 13:23

## 2020-12-31 RX ADMIN — TOLVAPTAN 30 MG: 15 TABLET ORAL at 13:16

## 2020-12-31 RX ADMIN — DEXAMETHASONE SODIUM PHOSPHATE 4 MG: 4 INJECTION, SOLUTION INTRAMUSCULAR; INTRAVENOUS at 02:05

## 2020-12-31 RX ADMIN — Medication 10 ML: at 02:05

## 2020-12-31 RX ADMIN — STANDARDIZED SENNA CONCENTRATE AND DOCUSATE SODIUM 2 TABLET: 8.6; 5 TABLET ORAL at 08:49

## 2020-12-31 RX ADMIN — DEXAMETHASONE SODIUM PHOSPHATE 4 MG: 4 INJECTION, SOLUTION INTRAMUSCULAR; INTRAVENOUS at 08:48

## 2020-12-31 ASSESSMENT — PAIN DESCRIPTION - FREQUENCY: FREQUENCY: CONTINUOUS

## 2020-12-31 ASSESSMENT — PAIN DESCRIPTION - PROGRESSION: CLINICAL_PROGRESSION: NOT CHANGED

## 2020-12-31 ASSESSMENT — PAIN DESCRIPTION - PAIN TYPE: TYPE: ACUTE PAIN

## 2020-12-31 ASSESSMENT — PAIN SCALES - GENERAL: PAINLEVEL_OUTOF10: 8

## 2020-12-31 ASSESSMENT — PAIN - FUNCTIONAL ASSESSMENT: PAIN_FUNCTIONAL_ASSESSMENT: ACTIVITIES ARE NOT PREVENTED

## 2020-12-31 NOTE — PROGRESS NOTES
Pulmonary Progress Note    CC: Pleural effusion    Subjective:   Patient feels about the same. Intake/Output Summary (Last 24 hours) at 12/31/2020 1127  Last data filed at 12/31/2020 2563  Gross per 24 hour   Intake 1196 ml   Output    Net 1196 ml       Exam:   /87   Pulse 95   Temp 96.6 °F (35.9 °C) (Oral)   Resp 18   Ht 6' 1\" (1.854 m)   Wt 166 lb 14.4 oz (75.7 kg)   SpO2 96%   BMI 22.02 kg/m²  on room air  Gen: No Distress. Ill-appearing  Eyes: PERRL. No sclera icterus. No conjunctival injection. ENT: No discharge. Pharynx clear. Neck: Trachea midline. No obvious mass. Resp:  No Accessory muscle use. No crackles. No wheezes. No rhonchi. R dullness on percussion. CV: Regular rate. Regular rhythm. No murmur or rub. GI: Non-tender. Non-distended. No hernia. Skin: Warm and dry. No nodule on exposed extremities. Lymph: No cervical LAD. R supraclavicular LAD. Right axillary lymphadenopathy    M/S: No cyanosis. No joint deformity. No clubbing. Neuro: Awake. Alert. Moves all four extremities. Psych: Oriented x 3. No anxiety.      Scheduled Meds:   polyethylene glycol  17 g Oral Daily    sennosides-docusate sodium  2 tablet Oral Daily    oxyCODONE  10 mg Oral 2 times per day    enoxaparin  1 mg/kg Subcutaneous BID    dexamethasone  4 mg Intravenous Q6H    sodium chloride flush  10 mL Intravenous 2 times per day     Continuous Infusions:    PRN Meds:  calcium carbonate, oxyCODONE-acetaminophen, sodium chloride flush, promethazine **OR** ondansetron, polyethylene glycol, acetaminophen **OR** acetaminophen, morphine    Labs:  CBC:   Recent Labs     12/29/20 0518 12/30/20 0356 12/31/20 0451   WBC 12.3* 15.5* 13.6*   HGB 12.5* 12.6* 12.8*   HCT 37.8* 37.7* 38.5*   MCV 79.4* 77.7* 77.6*    424 418     BMP:   Recent Labs     12/29/20 0518 12/30/20 0356 12/31/20 0451   * 128* 134*   K 4.3  4.3 3.6 4.1  4.1   CL 93* 94* 97*   CO2 27 26 27   PHOS 3.7  --  3.8 BUN 11 14 12   CREATININE <0.5* <0.5* <0.5*     LIVER PROFILE:   Recent Labs     12/29/20  0518 12/30/20  0356 12/31/20  0451   AST 6* 7* 7*   ALT 7* 9* 15   BILITOT <0.2 <0.2 <0.2   ALKPHOS 77 74 62     PT/INR: No results for input(s): PROTIME, INR in the last 72 hours. APTT:   Recent Labs     12/29/20 0518   APTT 33.7     UA:  No results for input(s): NITRITE, COLORU, PHUR, LABCAST, WBCUA, RBCUA, MUCUS, TRICHOMONAS, YEAST, BACTERIA, CLARITYU, SPECGRAV, LEUKOCYTESUR, UROBILINOGEN, BILIRUBINUR, BLOODU, GLUCOSEU, AMORPHOUS in the last 72 hours. Invalid input(s): KETONESU  No results for input(s): PHART, EOH8HQO, PO2ART in the last 72 hours. Cultures:   12/24 BC NGTD  12/24 pleural fluid no organisms    pleural fluid cytology- negative    Films:    CXR 12/30: Heart size is indeterminate.  The right hemithorax is completely opacified  and unchanged.  Very large right pleural effusion with atelectasis and  collapse of the right lung present.  Mild pulmonary vascular congestion  present within the left lung.  No acute airspace disease.  No left pleural  effusion. CT chest 12/24 imaging was reviewed by me and showed   Small pulmonary emboli left lower lobe   Marked abnormal mediastinal adenopathy, with compression and obscuration of the airways on the right.  There is collapse and consolidation seen of the right upper, right middle lobe, and right lower lobe.  There is abnormal pleural thickening on the right, suspicious for pleural implants, and a large loculated pleural effusion on the right. Metastatic adenopathy seen in the right axillary region, and supraclavicular   region.      Right axillary lymph node biopsy:       FINAL DIAGNOSIS:     Lymph node, right axilla, core biopsy:   - Metastatic mesothelioma, epithelioid type.    ASSESSMENT:  · Acute PE  · Probable SVC syndrome · Mediastinal/right hilar mass compressing right-sided airways with right lung collapse, pleural implants, axillary/supraclavicular lymphadenopathy, diffuse infiltrative nodularities throughout the peritoneum. Lymphoma versus metastatic bronchogenic carcinoma  · Right-sided pleural effusion-likely malignant effusion.  Post thoracentesis 12/24 800 mL bloody  · Weight loss  · Malignant mesothelioma     PLAN:  · Supplemental oxygen to maintain SaO2 >92%; wean as tolerated  · Oncology consulted  · Lovenox therapeutic dose- consider NOAC at d/c  · Decadron 4 mg IV every 6 hours  · D/c ok from my perspective  · Patient requests f/u with Dr. Lauren Mcmullen

## 2020-12-31 NOTE — PROGRESS NOTES
Chest: Small pulmonary emboli left lower lobe Marked abnormal mediastinal adenopathy, with compression and obscuration of the airways on the right. There is collapse and consolidation seen of the right upper, right middle lobe, and right lower lobe. There is abnormal pleural thickening on the right, suspicious for pleural implants, and a large loculated pleural effusion on the right. Metastatic adenopathy seen in the right axillary region, and supraclavicular region. Abdomen pelvis: There is widespread infiltrative nodular soft tissue density throughout the abdomen and pelvis, compatible with sequelae of peritoneal carcinomatosis. There is abnormal adenopathy in the gastrohepatic ligament, and retroperitoneum along with mild splenomegaly Ill-defined hypodense nodule in the liver and spleen, possibly metastatic foci Results discussed with Raffy Martin by Linda Hardy. Taurus Adkins MD at 2:23 pm on 12/24/2020     Ct Chest Pulmonary Embolism W Contrast  Result Date: 12/24/2020  Chest: Small pulmonary emboli left lower lobe Marked abnormal mediastinal adenopathy, with compression and obscuration of the airways on the right. There is collapse and consolidation seen of the right upper, right middle lobe, and right lower lobe. There is abnormal pleural thickening on the right, suspicious for pleural implants, and a large loculated pleural effusion on the right. Metastatic adenopathy seen in the right axillary region, and supraclavicular region. Abdomen pelvis: There is widespread infiltrative nodular soft tissue density throughout the abdomen and pelvis, compatible with sequelae of peritoneal carcinomatosis. There is abnormal adenopathy in the gastrohepatic ligament, and retroperitoneum along with mild splenomegaly Ill-defined hypodense nodule in the liver and spleen, possibly metastatic foci Results discussed with Raffy Martin by Linda Hardy.  Taurus Adkins MD at 2:23 pm on 12/24/2020     PATHOLOGY:

## 2020-12-31 NOTE — PROGRESS NOTES
[facility]       Progress Note           Subjective/   60y.o. year old male who we are seeing in consultation for Hyponatremia. HPI:    Given a dose of tolvaptan  Sodium is better at 134. He is hoping to go home  Just got diagnosis of cancer    ROS:  +R arm swelling is better   +LUIS. Objective/   GEN:  Chronically ill, /87   Pulse 95   Temp 96.6 °F (35.9 °C) (Oral)   Resp 18   Ht 6' 1\" (1.854 m)   Wt 166 lb 14.4 oz (75.7 kg)   SpO2 96%   BMI 22.02 kg/m²   HEENT: non-icteric, no JVD  CV: S1, S2 without m/r/g; no LE edema  RESP: CTA B without w/r/r; breathing wnl  ABD: +bs, soft, nt, no hsm  SKIN: warm, no rashes    Data/  Recent Labs     12/29/20  0518 12/30/20  0356 12/31/20  0451   WBC 12.3* 15.5* 13.6*   HGB 12.5* 12.6* 12.8*   HCT 37.8* 37.7* 38.5*   MCV 79.4* 77.7* 77.6*    424 418     Recent Labs     12/29/20  0518 12/30/20  0356 12/31/20  0451   * 128* 134*   K 4.3  4.3 3.6 4.1  4.1   CL 93* 94* 97*   CO2 27 26 27   GLUCOSE 105* 121* 125*   PHOS 3.7  --  3.8   BUN 11 14 12   CREATININE <0.5* <0.5* <0.5*   LABGLOM >60 >60 >60   GFRAA >60 >60 >60       Assessment/      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 U osm very high      Acute PE   - On lovenox     Hyperkalemia  - Resolved     Plan/     Repeat tolvaptan  Discussed diet recommendations for SIADH.      Fluid intake ideally around 45 oz per day   Try to add more protein in diet and liberal salt intake is ok   If additional issues may need urea / salt tabs / lasix   Ok for discharge

## 2020-12-31 NOTE — PROGRESS NOTES
Physician Progress Note      PATIENT:               Jaye Irwin  [facility] #:                  [id number]  :                       1965  ADMIT DATE:       2020 12:13 PM  DISCH DATE:  RESPONDING  PROVIDER #:        Mauri Cook CNP          QUERY TEXT:    Pt admitted with Abnormal CT Chest and Abdomen- metastatic Mesothelioma. Noted documentation of severe malnutrition per dietary consult on . If   possible, please document in progress notes and discharge summary:    The medical record reflects the following:  Risk Factors: new dx mesothelioma, incarcerated, catabolic illness,   psychological cause or life stress, inadequate protein-energy intake  Clinical Indicators: poor intake prior to admission, weight loss greater than   or equal to 10% in 6 months  Treatment: general diet, Ensure Enlive TID, dietician consult    Thank you for your assistance,  Shelli Dozier RN,BSN,CCDS,CRCR  Options provided:  -- Severe Protein Calorie Malnutrition confirmed POA  -- Severe Protein Calorie Malnutrition confirmed not POA  -- Severe Protein Calorie Malnutrition ruled out  -- Other - I will add my own diagnosis  -- Disagree - Not applicable / Not valid  -- Disagree - Clinically unable to determine / Unknown  -- Refer to Clinical Documentation Reviewer    PROVIDER RESPONSE TEXT:    The diagnosis of Severe Protein Calorie Malnutrition was confirmed as POA.     Query created by: Santiago Vital on 2020 2:15 PM      Electronically signed by:  Mauir Cook CNP 2020 2:54 PM

## 2020-12-31 NOTE — CARE COORDINATION
Giselle checked with the Medical Doctor at the care home and per [employer], he states that given the atmosphere at the care home, pt can have the paper scripts and can have them placed with his personal belongings for when he is released from care home and then have the scripts. The Medical Doctor will write pt medication for Norco himself as needed while in the care home. CM informed FERMIN Oliver and Brisa CHRISTENSEN (charge RN) of this. CM explained to them that CM can informed the deputy that is coming to  pt if needed, as well. No further discharge needs needed or noted. FERMIN Oliver, called deputy at care home and asked for pt to be picked up at 1500 today. Reviewed chart. Role of discharge planner explained and patient verbalized understanding. Discharge order is noted. Has Home O2 in place on admit:  No  Informed of need to bring portable home O2 tank on day of discharge for nursing to connect prior to leaving:   Not Indicated  Verbalized agreement/Understanding:   Not Indicated  Pt is being d/c'd to [facility] today. Pt's O2 sats are 96% on RA. Discharge timeout done with FERMIN Oliver. All discharge needs and concerns addressed.

## 2020-12-31 NOTE — PROGRESS NOTES
Hospitalist Progress Note      PCP: No primary care provider on file. Date of Admission: 12/24/2020    Chief Complaint: patient presented from from intermediate with c/o weight loss, right upper extremity, right chest swelling. Also reports dyspnea ongoing for several months. He also had chronic/worsening chest pain    Cytology from pleural fluid with no malignant cells   S;p right axillary LN bx    Subjective:     Pain control better today. Rt arm edema improved   Remains on RA  Wishes to go home    Path Lymph node, right axilla, core biopsy:   - Metastatic mesothelioma, epithelioid type. Medications:  Reviewed    Infusion Medications     Scheduled Medications    polyethylene glycol  17 g Oral Daily    sennosides-docusate sodium  2 tablet Oral Daily    oxyCODONE  10 mg Oral 2 times per day    enoxaparin  1 mg/kg Subcutaneous BID    dexamethasone  4 mg Intravenous Q6H    sodium chloride flush  10 mL Intravenous 2 times per day     PRN Meds: calcium carbonate, oxyCODONE-acetaminophen, sodium chloride flush, promethazine **OR** ondansetron, polyethylene glycol, acetaminophen **OR** acetaminophen, morphine      Intake/Output Summary (Last 24 hours) at 12/31/2020 1135  Last data filed at 12/31/2020 7924  Gross per 24 hour   Intake 1196 ml   Output    Net 1196 ml       Physical Exam Performed:    /87   Pulse 95   Temp 96.6 °F (35.9 °C) (Oral)   Resp 18   Ht 6' 1\" (1.854 m)   Wt 166 lb 14.4 oz (75.7 kg)   SpO2 96%   BMI 22.02 kg/m²       General: middle aged male,  Awake, alert and oriented.  Appears to be not in any distress  Mucous Membranes:  Pink , anicteric  Neck: No JVD, no carotid bruit, no thyromegaly  Chest: diminished in right mid and bases, clear on left side  Cardiovascular:  RRR S1S2 heard, no murmurs or gallops  Abdomen:  Soft, undistended, non tender, no organomegaly, BS present  Extremities: right UE 1 + edema  Distal pulses well felt  Axillary dressing dry right upper, right middle lobe, and right lower lobe. There is abnormal   pleural thickening on the right, suspicious for pleural implants, and a large   loculated pleural effusion on the right. Metastatic adenopathy seen in the right axillary region, and supraclavicular   region. Abdomen pelvis: There is widespread infiltrative nodular soft tissue density throughout the   abdomen and pelvis, compatible with sequelae of peritoneal carcinomatosis. There is abnormal adenopathy in the gastrohepatic ligament, and   retroperitoneum along with mild splenomegaly      Ill-defined hypodense nodule in the liver and spleen, possibly metastatic foci      Results discussed with Joan Burrell by Surendra Smiley. Huber Pimentel MD at 2:23 pm on   12/24/2020         CT CHEST PULMONARY EMBOLISM W CONTRAST   Final Result   Chest:      Small pulmonary emboli left lower lobe      Marked abnormal mediastinal adenopathy, with compression and obscuration of   the airways on the right. There is collapse and consolidation seen of the   right upper, right middle lobe, and right lower lobe. There is abnormal   pleural thickening on the right, suspicious for pleural implants, and a large   loculated pleural effusion on the right. Metastatic adenopathy seen in the right axillary region, and supraclavicular   region. Abdomen pelvis: There is widespread infiltrative nodular soft tissue density throughout the   abdomen and pelvis, compatible with sequelae of peritoneal carcinomatosis. There is abnormal adenopathy in the gastrohepatic ligament, and   retroperitoneum along with mild splenomegaly      Ill-defined hypodense nodule in the liver and spleen, possibly metastatic foci      Results discussed with Joan Burrell by Surendra Smiley. Huber Pimentel MD at 2:23 pm on   12/24/2020           Pathology  Right Side Pleural Fluid:      - Negative for malignancy.      - Benign mesothelial cells and chronic inflammatory cells. Path from Axillary LN core biopsy:   - Metastatic mesothelioma, epithelioid type. Assessment/Plan:    Abnormal CT Chest and Abdomen- metastatic Mesothelioma    Highly concerning for wide spread malignancy with carcinomatosis  - right hilar mass with compression of right sided airways and right lung collapse, pleural implants, axillary , supraclavicular LN     - primary unknown - possibly lung vs lymphoma   HemOnc and Pulm involved. S/p thoracentesis - Rt loculated pleural effusion - 800cc of fluid taken - cytology negative. Axillary LN bx with mesothelioma        Acute PE  Likely secondary to malignancy. On heparin gtt - to lovenox - access problems. - change to - eliquis        Chest Pain - he likely has widely metastatic Ca. Multi focal pain - mostly in R chest and chest wall. I suspect bone involvement with cancer. Added oxy ER and continue percocet for break through symptoms. Avoid IV opiate as able. He has not received IV morphine in the last couple days. Right UE edema and dyspnea   - sec to compressive symptoms- likely SVC syndrome  - on steroids per Pulm - can wean after chemo  - remains on RA      Constipation - add on bowel regimen r, resolved. DVT Prophylaxis: on heparin gtt to lovenox.      Dc home,   fw Dr. Uzma Bryant next week to plan chemo        Raul Man MD 12/31/2020 11:35 AM

## 2020-12-31 NOTE — DISCHARGE SUMMARY
Pt discharged to go back to [facility]. Paperwork given to [employer]. IV saline lock removed intact, DSD applied to the site. Pt belongings gathered and bagged by patient, and given to the  also. Pt dressed in hospital gown and wrapped in 2 warm blankets. Pt upset, because he was under the impression that he was going home from the hospital and not back to [facility].

## 2020-12-31 NOTE — PLAN OF CARE
Nutrition Problem #1: Severe malnutrition  Intervention: Food and/or Nutrient Delivery: Continue Current Diet, Start Oral Nutrition Supplement  Nutritional Goals: pt will increase his PO intake by consuming > 50% of meals and ensure and weight will remain > 165#

## 2020-12-31 NOTE — FLOWSHEET NOTE
Shift assessment complete. See flow sheet. Patient apears in no acute distress, eating a snack in bed. Respirations easy and even on room air, dyspnea with exertion. Pt states his pain is 6/10. Scheduled oxycontin given with other meds. PIV in left forearm flushes well., dressing reinforced with tubigrip. Pt expresses no other needs at this time. Call light and bedside table in reach. Will follow. 12/30/20 2100   Vital Signs   Temp 97.2 °F (36.2 °C)   Temp Source Oral   Pulse 114   Heart Rate Source Monitor   Resp 18   /76   BP Location Left upper arm   Patient Position Semi fowlers   Level of Consciousness Alert (0)   MEWS Score 3   Patient Currently in Pain Yes   Pain Assessment   Pain Assessment 0-10   Pain Level 6   Pain Type Acute pain   Pain Location Back;Rib cage; Arm   Pain Orientation Right   Pain Descriptors Constant; Aching   Pain Frequency Continuous   Pain Onset On-going   Patient's Stated Pain Goal 3   Functional Pain Assessment Activities are not prevented   Clinical Progression Not changed   Non-Pharmaceutical Pain Intervention(s) Repositioned; Rest   Oxygen Therapy   SpO2 94 %   O2 Device None (Room air)   O2 Flow Rate (L/min) 0 L/min

## 2020-12-31 NOTE — PROGRESS NOTES
Comprehensive Nutrition Assessment    Type and Reason for Visit:  Initial, Positive Nutrition Screen(wt loss)    Nutrition Recommendations/Plan:   1. Continue General Diet  2. Ensure Enlive TID     Nutrition Assessment:    Pt. severely malnourished AEB he has a sustined weight loss > 20% since August r/t incarceration and possible malignacy. At risk for further nutritional compromise r/t he voices that his weight keeps decreasing . Will add supps to meals . Malnutrition Assessment:  Malnutrition Status:  Severe malnutrition    Context:  Acute Illness     Findings of the 6 clinical characteristics of malnutrition:  Energy Intake:  7 - 50% or less of estimated energy requirements for 5 or more days  Weight Loss:  7 - Greater than 7.5% over 3 months     Body Fat Loss:  Unable to assess     Muscle Mass Loss:  Unable to assess    Fluid Accumulation:  Unable to assess     Strength:  Not Performed    Estimated Daily Nutrient Needs:  Energy (kcal):  7150-1152 based ~ 25-30 kcal/kg cbw; Weight Used for Energy Requirements:  Current     Protein (g):   based ~ 1.2-1.4 g /kg cbw; Weight Used for Protein Requirements:  Current        Fluid (ml/day):  0676-6301; Method Used for Fluid Requirements:  1 ml/kcal      Nutrition Related Findings:  thin male walking in room; states wt loss occured since Aug 7 d/t incarceration and chose not to eat;Acute PE- on lovenox. Hyponatremia- SIADH; Abnormal CT chest and abdomen concerning for metastatic CA. - lung vs. Lymphoma, suspect bone involvement; Large BM today      Wounds:  None       Current Nutrition Therapies:    DIET GENERAL; Anthropometric Measures:  · Height: 6' 1\" (185.4 cm)  · Current Body Weight: 165 lb (74.8 kg)   · Admission Body Weight: 169 lb (76.7 kg)    · Usual Body Weight: 215 lb (97.5 kg)     · Ideal Body Weight: 184 lbs; % Ideal Body Weight 89.7 %   · BMI: 21.8  · BMI Categories: Normal Weight (BMI 18.5-24. 9)       Nutrition Diagnosis: · Severe malnutrition related to catabolic illness, psychological cause or life stress, inadequate protein-energy intake as evidenced by poor intake prior to admission, weight loss greater than or equal to 10% in 6 months      Nutrition Interventions:   Food and/or Nutrient Delivery:  Continue Current Diet, Start Oral Nutrition Supplement  Nutrition Education/Counseling:  No recommendation at this time   Coordination of Nutrition Care:  Continue to monitor while inpatient, Coordination of Community Care    Goals:  pt will increase his PO intake by consuming > 50% of meals and ensure and weight will remain > 165#       Nutrition Monitoring and Evaluation:     Food/Nutrient Intake Outcomes:  Food and Nutrient Intake, Supplement Intake  Physical Signs/Symptoms Outcomes:  Biochemical Data, Nutrition Focused Physical Findings, Weight     Discharge Planning:     Too soon to determine     Electronically signed by Leslie Wilson RD, LD on 12/30/20 at 8:19 PM EST    Contact: [phone]

## 2020-12-31 NOTE — PROGRESS NOTES
Patient medicated with percocet for 8/10 right back, rib and arm pain. Pt expresses no other needs at this time. Decadron given per MAR.

## 2020-12-31 NOTE — TELEPHONE ENCOUNTER
Inpatient Notes  Received:  Today  Message Contents   MD Rolly Tatum MA             Please arrange f/u with Dr. Cesar Poole in 4-6 weeks

## 2021-01-05 NOTE — TELEPHONE ENCOUNTER
Patient was discharged from hospital back to skilled nursing. Will follow-up in 4 weeks to check if patient is released from skilled nursing.

## 2021-01-18 ENCOUNTER — HOSPITAL ENCOUNTER (OUTPATIENT)
Dept: GENERAL RADIOLOGY | Age: 56
Discharge: HOME | End: 2021-01-18
Payer: MEDICAID

## 2021-01-18 ENCOUNTER — HOSPITAL ENCOUNTER (OUTPATIENT)
Age: 56
Discharge: HOME | End: 2021-01-18
Payer: MEDICAID

## 2021-01-18 DIAGNOSIS — J90 PLEURAL EFFUSION: ICD-10-CM

## 2021-01-18 PROCEDURE — 71046 X-RAY EXAM CHEST 2 VIEWS: CPT

## 2021-01-20 ENCOUNTER — APPOINTMENT (OUTPATIENT)
Dept: CT IMAGING | Age: 56
DRG: 694 | End: 2021-01-20
Payer: MEDICAID

## 2021-01-20 ENCOUNTER — APPOINTMENT (OUTPATIENT)
Dept: GENERAL RADIOLOGY | Age: 56
DRG: 694 | End: 2021-01-20
Payer: MEDICAID

## 2021-01-20 ENCOUNTER — HOSPITAL ENCOUNTER (INPATIENT)
Age: 56
LOS: 3 days | Discharge: HOSPICE | DRG: 694 | End: 2021-01-23
Attending: EMERGENCY MEDICINE | Admitting: INTERNAL MEDICINE
Payer: MEDICAID

## 2021-01-20 DIAGNOSIS — I26.99 ACUTE PULMONARY EMBOLISM WITHOUT ACUTE COR PULMONALE, UNSPECIFIED PULMONARY EMBOLISM TYPE (HCC): ICD-10-CM

## 2021-01-20 DIAGNOSIS — Z51.5 HOSPICE CARE: ICD-10-CM

## 2021-01-20 DIAGNOSIS — R09.02 HYPOXIA: Primary | ICD-10-CM

## 2021-01-20 DIAGNOSIS — C34.90 PRIMARY MALIGNANT NEOPLASM OF LUNG METASTATIC TO OTHER SITE, UNSPECIFIED LATERALITY (HCC): ICD-10-CM

## 2021-01-20 DIAGNOSIS — J90 BILATERAL PLEURAL EFFUSION: ICD-10-CM

## 2021-01-20 PROBLEM — J96.00 ACUTE RESPIRATORY FAILURE (HCC): Status: ACTIVE | Noted: 2021-01-20

## 2021-01-20 LAB
A/G RATIO: 0.8 (ref 1.1–2.2)
ALBUMIN SERPL-MCNC: 2.8 G/DL (ref 3.4–5)
ALP BLD-CCNC: 136 U/L (ref 40–129)
ALT SERPL-CCNC: 8 U/L (ref 10–40)
ANION GAP SERPL CALCULATED.3IONS-SCNC: 2 MMOL/L (ref 3–16)
AST SERPL-CCNC: 9 U/L (ref 15–37)
BASE EXCESS VENOUS: -3.6 MMOL/L (ref -3–3)
BASOPHILS ABSOLUTE: 0.2 K/UL (ref 0–0.2)
BASOPHILS RELATIVE PERCENT: 1.2 %
BILIRUB SERPL-MCNC: 0.3 MG/DL (ref 0–1)
BUN BLDV-MCNC: 15 MG/DL (ref 7–20)
CALCIUM SERPL-MCNC: 8.1 MG/DL (ref 8.3–10.6)
CARBOXYHEMOGLOBIN: 1.7 % (ref 0–1.5)
CHLORIDE BLD-SCNC: 102 MMOL/L (ref 99–110)
CO2: 27 MMOL/L (ref 21–32)
CREAT SERPL-MCNC: <0.5 MG/DL (ref 0.9–1.3)
EOSINOPHILS ABSOLUTE: 0 K/UL (ref 0–0.6)
EOSINOPHILS RELATIVE PERCENT: 0.3 %
GFR AFRICAN AMERICAN: >60
GFR NON-AFRICAN AMERICAN: >60
GLOBULIN: 3.7 G/DL
GLUCOSE BLD-MCNC: 142 MG/DL (ref 70–99)
HCO3 VENOUS: 25.3 MMOL/L (ref 23–29)
HCT VFR BLD CALC: 37.4 % (ref 40.5–52.5)
HEMOGLOBIN: 12.2 G/DL (ref 13.5–17.5)
INR BLD: 1.3 (ref 0.86–1.14)
LACTIC ACID: 0.8 MMOL/L (ref 0.4–2)
LYMPHOCYTES ABSOLUTE: 0.7 K/UL (ref 1–5.1)
LYMPHOCYTES RELATIVE PERCENT: 4.1 %
MCH RBC QN AUTO: 26.5 PG (ref 26–34)
MCHC RBC AUTO-ENTMCNC: 32.6 G/DL (ref 31–36)
MCV RBC AUTO: 81.5 FL (ref 80–100)
METHEMOGLOBIN VENOUS: 0.6 %
MONOCYTES ABSOLUTE: 0.1 K/UL (ref 0–1.3)
MONOCYTES RELATIVE PERCENT: 0.8 %
NEUTROPHILS ABSOLUTE: 15.3 K/UL (ref 1.7–7.7)
NEUTROPHILS RELATIVE PERCENT: 93.6 %
O2 CONTENT, VEN: 17 VOL %
O2 SAT, VEN: 88 %
O2 THERAPY: ABNORMAL
PCO2, VEN: 63.6 MMHG (ref 40–50)
PDW BLD-RTO: 18 % (ref 12.4–15.4)
PH VENOUS: 7.22 (ref 7.35–7.45)
PLATELET # BLD: 688 K/UL (ref 135–450)
PMV BLD AUTO: 6.1 FL (ref 5–10.5)
PO2, VEN: 63.2 MMHG (ref 25–40)
POTASSIUM REFLEX MAGNESIUM: 5 MMOL/L (ref 3.5–5.1)
PRO-BNP: 255 PG/ML (ref 0–124)
PROCALCITONIN: 0.17 NG/ML (ref 0–0.15)
PROTHROMBIN TIME: 15.1 SEC (ref 10–13.2)
RBC # BLD: 4.59 M/UL (ref 4.2–5.9)
SARS-COV-2, NAAT: NOT DETECTED
SODIUM BLD-SCNC: 131 MMOL/L (ref 136–145)
TCO2 CALC VENOUS: 27 MMOL/L
TOTAL PROTEIN: 6.5 G/DL (ref 6.4–8.2)
TROPONIN: <0.01 NG/ML
WBC # BLD: 16.4 K/UL (ref 4–11)

## 2021-01-20 PROCEDURE — 99285 EMERGENCY DEPT VISIT HI MDM: CPT

## 2021-01-20 PROCEDURE — 6360000004 HC RX CONTRAST MEDICATION: Performed by: PHYSICIAN ASSISTANT

## 2021-01-20 PROCEDURE — 83605 ASSAY OF LACTIC ACID: CPT

## 2021-01-20 PROCEDURE — 80053 COMPREHEN METABOLIC PANEL: CPT

## 2021-01-20 PROCEDURE — 2580000003 HC RX 258: Performed by: INTERNAL MEDICINE

## 2021-01-20 PROCEDURE — 93005 ELECTROCARDIOGRAM TRACING: CPT | Performed by: EMERGENCY MEDICINE

## 2021-01-20 PROCEDURE — 51701 INSERT BLADDER CATHETER: CPT

## 2021-01-20 PROCEDURE — 2060000000 HC ICU INTERMEDIATE R&B

## 2021-01-20 PROCEDURE — 85610 PROTHROMBIN TIME: CPT

## 2021-01-20 PROCEDURE — 85025 COMPLETE CBC W/AUTO DIFF WBC: CPT

## 2021-01-20 PROCEDURE — 87040 BLOOD CULTURE FOR BACTERIA: CPT

## 2021-01-20 PROCEDURE — 71045 X-RAY EXAM CHEST 1 VIEW: CPT

## 2021-01-20 PROCEDURE — 84145 PROCALCITONIN (PCT): CPT

## 2021-01-20 PROCEDURE — U0002 COVID-19 LAB TEST NON-CDC: HCPCS

## 2021-01-20 PROCEDURE — 82803 BLOOD GASES ANY COMBINATION: CPT

## 2021-01-20 PROCEDURE — 94761 N-INVAS EAR/PLS OXIMETRY MLT: CPT

## 2021-01-20 PROCEDURE — 84484 ASSAY OF TROPONIN QUANT: CPT

## 2021-01-20 PROCEDURE — 71260 CT THORAX DX C+: CPT

## 2021-01-20 PROCEDURE — 6360000002 HC RX W HCPCS: Performed by: INTERNAL MEDICINE

## 2021-01-20 PROCEDURE — 2700000000 HC OXYGEN THERAPY PER DAY

## 2021-01-20 PROCEDURE — 99255 IP/OBS CONSLTJ NEW/EST HI 80: CPT | Performed by: THORACIC SURGERY (CARDIOTHORACIC VASCULAR SURGERY)

## 2021-01-20 PROCEDURE — 83880 ASSAY OF NATRIURETIC PEPTIDE: CPT

## 2021-01-20 RX ORDER — HEPARIN SODIUM 1000 [USP'U]/ML
6700 INJECTION, SOLUTION INTRAVENOUS; SUBCUTANEOUS PRN
Status: DISCONTINUED | OUTPATIENT
Start: 2021-01-21 | End: 2021-01-23 | Stop reason: HOSPADM

## 2021-01-20 RX ORDER — HEPARIN SODIUM 10000 [USP'U]/100ML
5-30 INJECTION, SOLUTION INTRAVENOUS CONTINUOUS
Status: DISCONTINUED | OUTPATIENT
Start: 2021-01-20 | End: 2021-01-21

## 2021-01-20 RX ORDER — ACETAMINOPHEN 650 MG/1
650 SUPPOSITORY RECTAL EVERY 6 HOURS PRN
Status: DISCONTINUED | OUTPATIENT
Start: 2021-01-20 | End: 2021-01-23 | Stop reason: HOSPADM

## 2021-01-20 RX ORDER — HEPARIN SODIUM 1000 [USP'U]/ML
6700 INJECTION, SOLUTION INTRAVENOUS; SUBCUTANEOUS ONCE
Status: COMPLETED | OUTPATIENT
Start: 2021-01-20 | End: 2021-01-21

## 2021-01-20 RX ORDER — SODIUM CHLORIDE 0.9 % (FLUSH) 0.9 %
10 SYRINGE (ML) INJECTION PRN
Status: DISCONTINUED | OUTPATIENT
Start: 2021-01-20 | End: 2021-01-23 | Stop reason: HOSPADM

## 2021-01-20 RX ORDER — PROMETHAZINE HYDROCHLORIDE 25 MG/1
12.5 TABLET ORAL EVERY 6 HOURS PRN
Status: DISCONTINUED | OUTPATIENT
Start: 2021-01-20 | End: 2021-01-23 | Stop reason: HOSPADM

## 2021-01-20 RX ORDER — SODIUM CHLORIDE 0.9 % (FLUSH) 0.9 %
10 SYRINGE (ML) INJECTION EVERY 12 HOURS SCHEDULED
Status: DISCONTINUED | OUTPATIENT
Start: 2021-01-20 | End: 2021-01-23 | Stop reason: HOSPADM

## 2021-01-20 RX ORDER — POTASSIUM CHLORIDE 7.45 MG/ML
10 INJECTION INTRAVENOUS PRN
Status: DISCONTINUED | OUTPATIENT
Start: 2021-01-20 | End: 2021-01-23 | Stop reason: HOSPADM

## 2021-01-20 RX ORDER — HEPARIN SODIUM 1000 [USP'U]/ML
3400 INJECTION, SOLUTION INTRAVENOUS; SUBCUTANEOUS PRN
Status: DISCONTINUED | OUTPATIENT
Start: 2021-01-21 | End: 2021-01-23 | Stop reason: HOSPADM

## 2021-01-20 RX ORDER — ACETAMINOPHEN 325 MG/1
650 TABLET ORAL EVERY 6 HOURS PRN
Status: DISCONTINUED | OUTPATIENT
Start: 2021-01-20 | End: 2021-01-23 | Stop reason: HOSPADM

## 2021-01-20 RX ORDER — APIXABAN 5 MG/1
TABLET, FILM COATED ORAL
COMMUNITY
Start: 2021-01-14

## 2021-01-20 RX ORDER — FOLIC ACID 1 MG/1
TABLET ORAL
COMMUNITY
Start: 2021-01-14

## 2021-01-20 RX ORDER — FAMOTIDINE 20 MG/1
20 TABLET, FILM COATED ORAL DAILY PRN
Status: DISCONTINUED | OUTPATIENT
Start: 2021-01-20 | End: 2021-01-23 | Stop reason: HOSPADM

## 2021-01-20 RX ORDER — ONDANSETRON 2 MG/ML
4 INJECTION INTRAMUSCULAR; INTRAVENOUS EVERY 6 HOURS PRN
Status: DISCONTINUED | OUTPATIENT
Start: 2021-01-20 | End: 2021-01-23 | Stop reason: HOSPADM

## 2021-01-20 RX ORDER — MAGNESIUM SULFATE IN WATER 40 MG/ML
2000 INJECTION, SOLUTION INTRAVENOUS PRN
Status: DISCONTINUED | OUTPATIENT
Start: 2021-01-20 | End: 2021-01-23 | Stop reason: HOSPADM

## 2021-01-20 RX ORDER — OXYCODONE HYDROCHLORIDE AND ACETAMINOPHEN 5; 325 MG/1; MG/1
1-2 TABLET ORAL EVERY 6 HOURS PRN
COMMUNITY
Start: 2021-01-14

## 2021-01-20 RX ADMIN — CEFEPIME HYDROCHLORIDE 2000 MG: 2 INJECTION, POWDER, FOR SOLUTION INTRAVENOUS at 23:25

## 2021-01-20 RX ADMIN — IOPAMIDOL 75 ML: 755 INJECTION, SOLUTION INTRAVENOUS at 17:35

## 2021-01-20 ASSESSMENT — ENCOUNTER SYMPTOMS
SHORTNESS OF BREATH: 1
COLOR CHANGE: 0
ABDOMINAL PAIN: 0
COUGH: 0
NAUSEA: 0
EYES NEGATIVE: 1
VOMITING: 0
BACK PAIN: 0

## 2021-01-20 NOTE — ED NOTES
Called Pulmonology @ [phone]  RE: Last--lung cancer with increased cancer burden, pleural effusion and PEs per Lucila Myles called back @ [phone]  01/20/21 5924

## 2021-01-20 NOTE — ED PROVIDER NOTES
[facility]  ED  EMERGENCY DEPARTMENT ENCOUNTER        Pt Name: Misael Bergeron  MRN: [id number]  [place] 1965  Date of evaluation: 1/20/2021  Provider: Lee Christianson PA-C  PCP: No primary care provider on file. ED Attending: Yazan Anand MD      This patient was seen by the attending provider Daron Downs MD    History provided by the patient    CHIEF COMPLAINT:     Chief Complaint   Patient presents with    Shortness of Breath     Pt to ED from oncology tx. Pt has hx of stage 4 lung cx and was receiving first tx when his oxygen decreased. Pt on 4L of O2 via NC with an oxygen saturation of 99%. Pt's oxygen 78% on RA. HISTORY OF PRESENT ILLNESS:      Misael Bergeron is a 60 y.o. male who arrives to the ED by [facility]. Patient sent from oncology. He was receiving treatment for lung cancer and while receiving treatment was found to be hypoxic. He was placed on supplemental oxygen and improved but upon removing it he his sats dropped again. Patient maintains oxygen saturations in the 90s on 4 L nasal cannula oxygen but without it, drops to the 70s. Again he has known stage IV lung cancer. Patient himself is without complaints. He doesn't complain of being particularly short of breath though was hypoxic. Upon asking if he was in any pain he states \"no more than usual\". Patient has no other complaints or concerns today. He would not have come had he not been found to be hypoxic at his cancer treatment and been sent in.    Nursing Notes were reviewed     REVIEW OF SYSTEMS:     Review of Systems   Constitutional: Negative for appetite change, chills and fever. HENT: Negative. Eyes: Negative. Respiratory: Positive for shortness of breath. Negative for cough. Cardiovascular: Negative for chest pain. Gastrointestinal: Negative for abdominal pain, nausea and vomiting. Genitourinary: Negative. Musculoskeletal: Negative for back pain and neck pain. Skin: Negative for color change. Neurological: Negative for dizziness and headaches. All other systems reviewed and are negative. Except as noted above in the ROS, all other systems were reviewed and negative. PAST MEDICAL HISTORY:     Past Medical History:   Diagnosis Date    Depression          SURGICAL HISTORY:      Past Surgical History:   Procedure Laterality Date    US LYMPH NODE BIOPSY  12/29/2020    US LYMPH NODE BIOPSY 12/29/2020 [facility] ULTRASOUND         CURRENT MEDICATIONS:       Previous Medications    ELIQUIS 5 MG TABS TABLET    TAKE 1 TABLET BY MOUTH TWICE A DAY    FOLIC ACID (FOLVITE) 1 MG TABLET    TAKE 1 TABLET BY MOUTH EVERY DAY    OXYCODONE-ACETAMINOPHEN (PERCOCET) 5-325 MG PER TABLET    TAKE 1 TABLET BY MOUTH EVERY 4 TO 6 HOURS AS NEEDED FOR PAIN    SENNOSIDES-DOCUSATE SODIUM (SENOKOT-S) 8.6-50 MG TABLET    Take 2 tablets by mouth daily         ALLERGIES:    Patient has no known allergies. FAMILY HISTORY:     History reviewed. No pertinent family history.        SOCIAL HISTORY:       Social History     Socioeconomic History    Marital status:      Spouse name: None    Number of children: None    Years of education: None    Highest education level: None   Occupational History    None   Social Needs    Financial resource strain: None    Food insecurity     Worry: None     Inability: None    Transportation needs     Medical: None     Non-medical: None   Tobacco Use    Smoking status: Never Smoker    Smokeless tobacco: Never Used   Substance and Sexual Activity    Alcohol use: Not Currently     Alcohol/week: 77.0 standard drinks     Types: 70 Cans of beer, 7 Shots of liquor per week     Comment: sober since 08-    Drug use: Not Currently     Frequency: 7.0 times per week     Types: Marijuana     Comment: clean since 08/07/2020    Sexual activity: None   Lifestyle    Physical activity     Days per week: None     Minutes per session: None    Stress: None   Relationships    Social connections     Talks on phone: None     Gets together: None     Attends Zoroastrianism service: None     Active member of club or organization: None     Attends meetings of clubs or organizations: None     Relationship status: None    Intimate partner violence     Fear of current or ex partner: None     Emotionally abused: None     Physically abused: None     Forced sexual activity: None   Other Topics Concern    None   Social History Narrative    None       SCREENINGS:    Hiram Coma Scale  Eye Opening: Spontaneous  Best Verbal Response: Oriented  Best Motor Response: Obeys commands  Hiram Coma Scale Score: 15        PHYSICAL EXAM:       ED Triage Vitals [01/20/21 1616]   BP Temp Temp Source Pulse Resp SpO2 Height Weight   125/81 98 °F (36.7 °C) Oral 105 22 -- 6' 1\" (1.854 m) 200 lb (90.7 kg)       Physical Exam    CONSTITUTIONAL: Awake and alert. Cooperative. Well-developed. Chronically ill in appearance. No acute distress. HENT: Normocephalic. Atraumatic. External ears normal, without discharge. No nasal discharge. Oropharynx clear. Mucous membranes moist.  EYES: Conjunctiva non-injected. No scleral icterus. PERRL. EOM's grossly intact. NECK: Supple. Normal ROM. CARDIOVASCULAR: RRR. No Murmer. Intact distal pulses. PULMONARY/CHEST WALL: Effort normal. No tachypnea. Lungs with nearly absent breath sounds on the right. Decreased on the left. ABDOMEN: Normal BS. Soft. Nondistended. No tenderness to palpate. No guarding. /ANORECTAL: Not assessed  MUSKULOSKELETAL: Normal ROM. No acute deformities. No edema. No tenderness to palpate. SKIN: Warm and dry. No rash. Pale. NEUROLOGICAL: Alert and oriented x 3. GCS 15. CN II-XII grossly intact. Strength is 5/5 in all extremities and sensation is intact. Normal gait.    PSYCHIATRIC: Normal affect        DIAGNOSTICRESULTS:     LABS:    Results for orders placed or performed during the hospital encounter of 01/20/21   CBC Auto Differential   Result Value Ref Range    WBC 16.4 (H) 4.0 - 11.0 K/uL    RBC 4.59 4.20 - 5.90 M/uL    Hemoglobin 12.2 (L) 13.5 - 17.5 g/dL    Hematocrit 37.4 (L) 40.5 - 52.5 %    MCV 81.5 80.0 - 100.0 fL    MCH 26.5 26.0 - 34.0 pg    MCHC 32.6 31.0 - 36.0 g/dL    RDW 18.0 (H) 12.4 - 15.4 %    Platelets 753 (H) 885 - 450 K/uL    MPV 6.1 5.0 - 10.5 fL    Neutrophils % 93.6 %    Lymphocytes % 4.1 %    Monocytes % 0.8 %    Eosinophils % 0.3 %    Basophils % 1.2 %    Neutrophils Absolute 15.3 (H) 1.7 - 7.7 K/uL    Lymphocytes Absolute 0.7 (L) 1.0 - 5.1 K/uL    Monocytes Absolute 0.1 0.0 - 1.3 K/uL    Eosinophils Absolute 0.0 0.0 - 0.6 K/uL    Basophils Absolute 0.2 0.0 - 0.2 K/uL   Comprehensive Metabolic Panel w/ Reflex to MG   Result Value Ref Range    Sodium 131 (L) 136 - 145 mmol/L    Potassium reflex Magnesium 5.0 3.5 - 5.1 mmol/L    Chloride 102 99 - 110 mmol/L    CO2 27 21 - 32 mmol/L    Anion Gap 2 (L) 3 - 16    Glucose 142 (H) 70 - 99 mg/dL    BUN 15 7 - 20 mg/dL    CREATININE <0.5 (L) 0.9 - 1.3 mg/dL    GFR Non-African American >60 >60    GFR African American >60 >60    Calcium 8.1 (L) 8.3 - 10.6 mg/dL    Total Protein 6.5 6.4 - 8.2 g/dL    Alb 2.8 (L) 3.4 - 5.0 g/dL    Albumin/Globulin Ratio 0.8 (L) 1.1 - 2.2    Total Bilirubin 0.3 0.0 - 1.0 mg/dL    Alkaline Phosphatase 136 (H) 40 - 129 U/L    ALT 8 (L) 10 - 40 U/L    AST 9 (L) 15 - 37 U/L    Globulin 3.7 g/dL   Troponin   Result Value Ref Range    Troponin <0.01 <0.01 ng/mL   Brain Natriuretic Peptide   Result Value Ref Range    Pro- (H) 0 - 124 pg/mL   Protime-INR   Result Value Ref Range    Protime 15.1 (H) 10.0 - 13.2 sec    INR 1.30 (H) 0.86 - 1.14   Lactic Acid, Plasma   Result Value Ref Range    Lactic Acid 0.8 0.4 - 2.0 mmol/L   Procalcitonin   Result Value Ref Range    Procalcitonin 0.17 (H) 0.00 - 0.15 ng/mL   Blood Gas, Venous   Result Value Ref Range    pH, Vladislav 7.218 (L) 7.350 - 7.450    pCO2, Vladislav 63.6 (H) 40.0 - 50.0 mmHg    pO2, Vladislav 63.2 administration of intravenous contrast.  Multiplanar reformatted images are provided for review. MIP images are provided for review. Dose modulation, iterative reconstruction, and/or weight based adjustment of the mA/kV was utilized to reduce the radiation dose to as low as reasonably achievable. COMPARISON: 12/24/2020. HISTORY: ORDERING SYSTEM PROVIDED HISTORY: New hypoxia, shortness of breath, recent PE TECHNOLOGIST PROVIDED HISTORY: Reason for exam:->New hypoxia, shortness of breath, recent PE Decision Support Exception->Emergency Medical Condition (MA) Reason for Exam: New hypoxia, shortness of breath, recent PE Acuity: Acute Type of Exam: Initial FINDINGS: Pulmonary Arteries: Pulmonary arteries are adequately opacified for evaluation. There is redemonstration of left lower lobe pulmonary emboli in 2nd and 3rd order branches. There is no significant interval change and these are likely chronic. However, there is a new embolus in a left upper lobe pulmonary branch. No significant right heart strain. Mildly enlarged main pulmonary artery. Mediastinum: The thoracic aorta is normal in course and caliber. Mild cardiomegaly with small pericardial effusion. Extensive mediastinal and hilar adenopathy with progression. A representative right paratracheal node measures 2.5 cm transversely compared with 2.0 cm previously. Diffuse esophageal wall thickening. Lungs/pleura: There is medial collapse of the right lung. Large pleural fluid collection with marginal pleural thickening is stable. There is a new moderate left pleural effusion. There is interlobular septal thickening throughout the left lung with ground-glass opacification consistent with edema. Some dependent left lower lobe atelectasis. There is opacification in the left perihilar left upper lobe likely representing consolidated lung. Upper Abdomen: Extensive adenopathy within the upper abdomen is again noted. Small quantity of perihepatic ascites. The appendix and splenic lesions noted on previous CT of the abdomen are incompletely assessed due to the phase of imaging. Soft Tissues/Bones: Extensive metastatic adenopathy in the right axilla. No acute osseous abnormality. 1. Stable chronic emboli within the left lower lobe. New left upper lobe pulmonary embolus in a 1st or 2nd order branch. No evidence of right heart strain. 2. Stable large malignant pleural collection on the right with collapse of the right lung. New moderate left pleural effusion with diffuse interstitial edema, possibly lymphangitic tumor. 3. Progressive extensive mediastinal and bilateral hilar adenopathy as well as right axillary adenopathy. Metastatic adenopathy in the upper abdomen with small quantity of perihepatic ascites. 4. Diffuse esophageal wall thickening, likely an esophagitis. Findings were discussed with Dr. Sandra Pascual at 6:01 pm on 1/20/2021. EKG: The Ekg interpreted by me in the absence of a cardiologist shows. sinus tachycardia, [id number]     See also interpretation by Isaak Hair MD.      PROCEDURES:   N/A    CRITICAL CARE TIME:       Due to the immediate potential for life-threatening deterioration due to hypoxia and significant worsening of pleural effusions and cancer/metastases, I spent 32 minutes providing critical care. This time is excluding time spent performing procedures.       CONSULTS:  IP CONSULT TO PULMONOLOGY  IP CONSULT TO CARDIOTHORACIC SURGERY      EMERGENCY DEPARTMENT COURSE and DIFFERENTIAL DIAGNOSIS/MDM:   Vitals:    Vitals:    01/20/21 1616 01/20/21 1748 01/20/21 1849 01/20/21 1949   BP: 125/81  110/76 100/76   Pulse: 105 110 113 114   Resp: 22 18 23    Temp: 98 °F (36.7 °C)      TempSrc: Oral      SpO2:  96% 95% 96%   Weight: 200 lb (90.7 kg)      Height: 6' 1\" (1.854 m)          Patient was given the following medications:  Medications   iopamidol (ISOVUE-370) 76 % injection 75 mL (75 mLs Intravenous Given 1/20/21 2755) Patient was evaluated by both myself and Daron Elise MD.   Old records were reviewed. Patient arrives to the ED by EMS from oncology. Patient found to be hypoxic while getting treated today. He was hypoxic in the 70s on room air and has required 4 L nasal cannula oxygen to maintain sats in the 90s. Patient is essentially without complaints but given his history of and his hypoxia broad work-up was initiated. COVID-19 not detected  CBC reveals white count of 16.4 with H&H 12.2 and 37.4  CMP mild hyponatremia 131. Remaining CMP unremarkable  Troponin negative  proBNP 255  PT 15.1 and INR 1.3  Lactate 0.8  Procalcitonin 0.17  VBG reveals pH 7.218, PCO2 63.6, PO2 63.2  Pleural chest x-ray shows stable compared to 1/18/2021. Redemonstration of complete opacification of the right hemithorax with mild mediastinal shift towards the left. Persistent vascular congestion changes are present on the left  CT chest PE study is performed revealing   1. Stable chronic emboli within the left lower lobe.  New left upper lobe   pulmonary embolus in a 1st or 2nd order branch.  No evidence of right heart   strain. 2. Stable large malignant pleural collection on the right with collapse of   the right lung.  New moderate left pleural effusion with diffuse interstitial   edema, possibly lymphangitic tumor. 3. Progressive extensive mediastinal and bilateral hilar adenopathy as well   as right axillary adenopathy.  Metastatic adenopathy in the upper abdomen   with small quantity of perihepatic ascites. 4. Diffuse esophageal wall thickening, likely an esophagitis. Findings were discussed with Dr. Umang Good at 6:01 pm on 1/20/2021. Patient is stable on 4 L nasal cannula oxygen. He will need pulmonology involvement family to be monitored closely. I made him aware of findings on scan including the worsening of his cancer.   Patient acknowledges understanding of this and the fact that he requires hospitalization. I'm making consultations for admission. I spoke with Dr. Samantha Phillip and Anjali Mcqueen. We thoroughly discussed the history, physical exam, laboratory and imaging studies, as well as, emergency department course. Based upon that discussion, we've decided to admit Mercedez Sen for further observation and evaluation of Katelynn Hunter's hypoxia with bilateral pleural effusions and pulmonary emboli as I have deemed necessary from their history, physical, and studies, I have considered and evaluated Mercedez Sen for the following diagnoses:  ACUTE CORONARY SYNDROME, PERICARDIAL TAMPONADE, CHF, SEPSIS, COPD EXACERBATION, PNEUMONIA, PNEUMOTHORAX, PULMONARY EMBOLISM, and THORACIC DISSECTION. FINAL IMPRESSION:      1. Hypoxia    2. Bilateral pleural effusion    3. Acute pulmonary embolism without acute cor pulmonale, unspecified pulmonary embolism type ([address]    4.  Primary malignant neoplasm of lung metastatic to other site, unspecified laterality [facility])          DISPOSITION/PLAN:   DISPOSITION     ADMIT         (Please note thatportions of this note were completed with a voice recognition program.  Efforts were made to edit the dictations, but occasionally words are mis-transcribed.)    Nicolle Jorge PA-C (electronicallysigned)              Dominick Dorado  01/20/21 2026

## 2021-01-20 NOTE — ED PROVIDER NOTES
Emergency Department Provider Note     Location: [address] [place] [address]  ED  1/20/2021    I independently performed a history and physical on Nelly Garcia. All diagnostic, treatment, and disposition decisions were made by myself in conjunction with the mid-level provider. Briefly, this is a 60 y.o. male here for new oxygen requirement. Patient has known stage IV lung cancer and was receiving his first treatment when the nursing staff monitoring him noted that his oxygen saturation was drifting downwards. Patient was 78% on room air and subsequently placed on 4 L nasal cannula. Patient is still on 4 L nasal cannula satting 96%. He denies any other complaints at this time. He reports that he does not feel any worse than he typically does. .      ED Triage Vitals [01/20/21 1616]   BP Temp Temp Source Pulse Resp SpO2 Height Weight   125/81 98 °F (36.7 °C) Oral 105 22 -- 6' 1\" (1.854 m) 200 lb (90.7 kg)        Patient resting comfortably in no acute distress. Heart is tachycardic rate and regular rhythm. Lungs are diminished without wheezes or rhonchi. .  Abdomen is soft, nondistended, and nontender. Significant swelling of the right upper extremity which patient states has been present for the last couple months. Patient seen and examined. Vital signs notable for mild tachycardia, normotensive, oxygen saturation 96% on 4 L nasal cannula which patient did not previously require. Physical exam as documented above. Chest x-ray shows stable appearance but with patient's worsening symptoms CT scan is obtained. CT scan as documented below. Lab work-up notable for leukocytosis, elevated pro-Andres but symptoms more concerning for worsening metastatic disease than truly pneumonia. Did not cover with antibiotics at this time. Patient admitted to hospitalist service but they were concerned about patient needing a chest tube.   At this point in the day the hospitalist is on-call for pulmonology and the nighttime hospitalist is not credentialed to place chest tubes. Explained that at this point time patient is hemodynamically stable on only 4 L nasal cannula and has remained on that during his hospital stay today for the last 5 hours. As patient is also anticoagulated with Eliquis I do not feel patient needs a chest tube right now nor do I feel as though the risk outweighs the benefit. Hospitalist wanted to consult cardiothoracic surgery as well as IR prior to accepting the patient. Patient's admission was delayed due to this. EKG  The Ekg interpreted by me in the absence of a cardiologist shows. Sinus tachycardia, rate 103, normal intervals, no STEMI      Xr Chest Portable    Result Date: 1/20/2021  EXAMINATION: ONE XRAY VIEW OF THE CHEST 1/20/2021 4:08 pm COMPARISON: January 18, 2021 HISTORY: ORDERING SYSTEM PROVIDED HISTORY: SOB TECHNOLOGIST PROVIDED HISTORY: Reason for exam:->SOB Ongoing evaluation, shortness of breath FINDINGS: Cardiomediastinal silhouette is stable in size and contour. Mild leftward displacement of the mediastinum appears stable. Redemonstration of complete opacification of the right hemithorax. Stable vascular congestive changes are present on the left without overt pulmonary edema. No pleural effusion or pneumothorax is identified. Overall, no significant interval change. Stable chest x-ray compared to January 18, 2021. Redemonstration of complete opacification of the right hemithorax with mild mediastinal shift towards the left. Persistent vascular congestive changes are present on the left. Ct Chest Pulmonary Embolism W Contrast    Result Date: 1/20/2021  EXAMINATION: CTA OF THE CHEST 1/20/2021 5:19 pm TECHNIQUE: CTA of the chest was performed after the administration of intravenous contrast.  Multiplanar reformatted images are provided for review. MIP images are provided for review.  Dose modulation, iterative reconstruction, and/or weight based adjustment of the mA/kV was utilized to reduce the radiation dose to as low as reasonably achievable. COMPARISON: 12/24/2020. HISTORY: ORDERING SYSTEM PROVIDED HISTORY: New hypoxia, shortness of breath, recent PE TECHNOLOGIST PROVIDED HISTORY: Reason for exam:->New hypoxia, shortness of breath, recent PE Decision Support Exception->Emergency Medical Condition (MA) Reason for Exam: New hypoxia, shortness of breath, recent PE Acuity: Acute Type of Exam: Initial FINDINGS: Pulmonary Arteries: Pulmonary arteries are adequately opacified for evaluation. There is redemonstration of left lower lobe pulmonary emboli in 2nd and 3rd order branches. There is no significant interval change and these are likely chronic. However, there is a new embolus in a left upper lobe pulmonary branch. No significant right heart strain. Mildly enlarged main pulmonary artery. Mediastinum: The thoracic aorta is normal in course and caliber. Mild cardiomegaly with small pericardial effusion. Extensive mediastinal and hilar adenopathy with progression. A representative right paratracheal node measures 2.5 cm transversely compared with 2.0 cm previously. Diffuse esophageal wall thickening. Lungs/pleura: There is medial collapse of the right lung. Large pleural fluid collection with marginal pleural thickening is stable. There is a new moderate left pleural effusion. There is interlobular septal thickening throughout the left lung with ground-glass opacification consistent with edema. Some dependent left lower lobe atelectasis. There is opacification in the left perihilar left upper lobe likely representing consolidated lung. Upper Abdomen: Extensive adenopathy within the upper abdomen is again noted. Small quantity of perihepatic ascites. The appendix and splenic lesions noted on previous CT of the abdomen are incompletely assessed due to the phase of imaging. Soft Tissues/Bones: Extensive metastatic adenopathy in the right axilla.   No acute osseous abnormality. 1. Stable pulmonary emboli within the left lower lobe. New left upper lobe pulmonary embolus in a 1st or 2nd order branch. No evidence of right heart strain. 2. Stable large malignant pleural collection on the right with collapse of the right lung. New moderate left pleural effusion with diffuse interstitial edema, possibly lymphangitic tumor. 3. Progressive extensive mediastinal and bilateral hilar adenopathy as well as right axillary adenopathy. Metastatic adenopathy in the upper abdomen with small quantity of perihepatic ascites. 4. Diffuse esophageal wall thickening, likely an esophagitis. Findings were discussed with Dr. Deborah Darnell at 6:01 pm on 1/20/2021.          Results for orders placed or performed during the hospital encounter of 01/20/21   CBC Auto Differential   Result Value Ref Range    WBC 16.4 (H) 4.0 - 11.0 K/uL    RBC 4.59 4.20 - 5.90 M/uL    Hemoglobin 12.2 (L) 13.5 - 17.5 g/dL    Hematocrit 37.4 (L) 40.5 - 52.5 %    MCV 81.5 80.0 - 100.0 fL    MCH 26.5 26.0 - 34.0 pg    MCHC 32.6 31.0 - 36.0 g/dL    RDW 18.0 (H) 12.4 - 15.4 %    Platelets 640 (H) 991 - 450 K/uL    MPV 6.1 5.0 - 10.5 fL    Neutrophils % 93.6 %    Lymphocytes % 4.1 %    Monocytes % 0.8 %    Eosinophils % 0.3 %    Basophils % 1.2 %    Neutrophils Absolute 15.3 (H) 1.7 - 7.7 K/uL    Lymphocytes Absolute 0.7 (L) 1.0 - 5.1 K/uL    Monocytes Absolute 0.1 0.0 - 1.3 K/uL    Eosinophils Absolute 0.0 0.0 - 0.6 K/uL    Basophils Absolute 0.2 0.0 - 0.2 K/uL   Comprehensive Metabolic Panel w/ Reflex to MG   Result Value Ref Range    Sodium 131 (L) 136 - 145 mmol/L    Potassium reflex Magnesium 5.0 3.5 - 5.1 mmol/L    Chloride 102 99 - 110 mmol/L    CO2 27 21 - 32 mmol/L    Anion Gap 2 (L) 3 - 16    Glucose 142 (H) 70 - 99 mg/dL    BUN 15 7 - 20 mg/dL    CREATININE <0.5 (L) 0.9 - 1.3 mg/dL    GFR Non-African American >60 >60    GFR African American >60 >60    Calcium 8.1 (L) 8.3 - 10.6 mg/dL    Total Protein 6.5 6.4 - 8.2 g/dL Alb 2.8 (L) 3.4 - 5.0 g/dL    Albumin/Globulin Ratio 0.8 (L) 1.1 - 2.2    Total Bilirubin 0.3 0.0 - 1.0 mg/dL    Alkaline Phosphatase 136 (H) 40 - 129 U/L    ALT 8 (L) 10 - 40 U/L    AST 9 (L) 15 - 37 U/L    Globulin 3.7 g/dL   Troponin   Result Value Ref Range    Troponin <0.01 <0.01 ng/mL   Brain Natriuretic Peptide   Result Value Ref Range    Pro- (H) 0 - 124 pg/mL   Protime-INR   Result Value Ref Range    Protime 15.1 (H) 10.0 - 13.2 sec    INR 1.30 (H) 0.86 - 1.14   Lactic Acid, Plasma   Result Value Ref Range    Lactic Acid 0.8 0.4 - 2.0 mmol/L   Procalcitonin   Result Value Ref Range    Procalcitonin 0.17 (H) 0.00 - 0.15 ng/mL   Blood Gas, Venous   Result Value Ref Range    pH, Vladislav 7.218 (L) 7.350 - 7.450    pCO2, Vladislav 63.6 (H) 40.0 - 50.0 mmHg    pO2, Vladislav 63.2 (H) 25.0 - 40.0 mmHg    HCO3, Venous 25.3 23.0 - 29.0 mmol/L    Base Excess, Vladislav -3.6 (L) -3.0 - 3.0 mmol/L    O2 Sat, Vladislav 88 Not Established %    Carboxyhemoglobin 1.7 (H) 0.0 - 1.5 %    MetHgb, Vladislav 0.6 <1.5 %    TC02 (Calc), Vladislav 27 Not Established mmol/L    O2 Content, Vladislav 17 Not Established VOL %    O2 Therapy Unknown    COVID-19   Result Value Ref Range    SARS-CoV-2, NAAT Not Detected Not Detected   EKG 12 Lead   Result Value Ref Range    Ventricular Rate 103 BPM    Atrial Rate 103 BPM    P-R Interval 174 ms    QRS Duration 90 ms    Q-T Interval 338 ms    QTc Calculation (Bazett) 442 ms    P Axis 50 degrees    R Axis 105 degrees    T Axis 46 degrees    Diagnosis       Sinus tachycardiaRightward axisBorderline ECGNo previous ECGs available       For further details of [facility] emergency department encounter, please see Nicky Romero's documentation. Total critical care time is 15 minutes, which excludes separately billable procedures and updating family. Time spent is specifically for management of the presenting complaint and symptoms initially, direct bedside care, reevaluation, review of records, and consultation.   There was a high probability of clinically significant life-threatening deterioration in the patient's condition, which required my urgent intervention. This chart was generated in part by using Dragon Dictation system and may contain errors related to that system including errors in grammar, punctuation, and spelling, as well as words and phrases that may be inappropriate. If there are any questions or concerns please feel free to contact the dictating provider for clarification.           Daron Solano MD  01/20/21 4917

## 2021-01-20 NOTE — ED NOTES
Bed: 06  Expected date:   Expected time:   Means of arrival:   Comments:  [place] [facility]  01/20/21 2436

## 2021-01-21 LAB
A/G RATIO: 0.7 (ref 1.1–2.2)
ALBUMIN SERPL-MCNC: 2.4 G/DL (ref 3.4–5)
ALP BLD-CCNC: 115 U/L (ref 40–129)
ALT SERPL-CCNC: 8 U/L (ref 10–40)
ANION GAP SERPL CALCULATED.3IONS-SCNC: 5 MMOL/L (ref 3–16)
APTT: 30.5 SEC (ref 24.2–36.2)
APTT: 45.1 SEC (ref 24.2–36.2)
APTT: 52.2 SEC (ref 24.2–36.2)
APTT: 71.1 SEC (ref 24.2–36.2)
AST SERPL-CCNC: 8 U/L (ref 15–37)
BASOPHILS ABSOLUTE: 0.1 K/UL (ref 0–0.2)
BASOPHILS RELATIVE PERCENT: 0.8 %
BILIRUB SERPL-MCNC: <0.2 MG/DL (ref 0–1)
BUN BLDV-MCNC: 15 MG/DL (ref 7–20)
CALCIUM SERPL-MCNC: 8.5 MG/DL (ref 8.3–10.6)
CHLORIDE BLD-SCNC: 104 MMOL/L (ref 99–110)
CO2: 29 MMOL/L (ref 21–32)
CREAT SERPL-MCNC: 0.7 MG/DL (ref 0.9–1.3)
EKG ATRIAL RATE: 103 BPM
EKG DIAGNOSIS: NORMAL
EKG P AXIS: 50 DEGREES
EKG P-R INTERVAL: 174 MS
EKG Q-T INTERVAL: 338 MS
EKG QRS DURATION: 90 MS
EKG QTC CALCULATION (BAZETT): 442 MS
EKG R AXIS: 105 DEGREES
EKG T AXIS: 46 DEGREES
EKG VENTRICULAR RATE: 103 BPM
EOSINOPHILS ABSOLUTE: 0 K/UL (ref 0–0.6)
EOSINOPHILS RELATIVE PERCENT: 0 %
GFR AFRICAN AMERICAN: >60
GFR NON-AFRICAN AMERICAN: >60
GLOBULIN: 3.6 G/DL
GLUCOSE BLD-MCNC: 125 MG/DL (ref 70–99)
HCT VFR BLD CALC: 36.2 % (ref 40.5–52.5)
HEMOGLOBIN: 11.7 G/DL (ref 13.5–17.5)
LYMPHOCYTES ABSOLUTE: 0.9 K/UL (ref 1–5.1)
LYMPHOCYTES RELATIVE PERCENT: 7.9 %
MCH RBC QN AUTO: 26.1 PG (ref 26–34)
MCHC RBC AUTO-ENTMCNC: 32.2 G/DL (ref 31–36)
MCV RBC AUTO: 81.1 FL (ref 80–100)
MONOCYTES ABSOLUTE: 0.9 K/UL (ref 0–1.3)
MONOCYTES RELATIVE PERCENT: 8.4 %
NEUTROPHILS ABSOLUTE: 9.3 K/UL (ref 1.7–7.7)
NEUTROPHILS RELATIVE PERCENT: 82.9 %
PDW BLD-RTO: 17.5 % (ref 12.4–15.4)
PLATELET # BLD: 638 K/UL (ref 135–450)
PMV BLD AUTO: 6 FL (ref 5–10.5)
POTASSIUM REFLEX MAGNESIUM: 5.2 MMOL/L (ref 3.5–5.1)
RBC # BLD: 4.46 M/UL (ref 4.2–5.9)
SODIUM BLD-SCNC: 138 MMOL/L (ref 136–145)
TOTAL PROTEIN: 6 G/DL (ref 6.4–8.2)
WBC # BLD: 11.2 K/UL (ref 4–11)

## 2021-01-21 PROCEDURE — 6360000002 HC RX W HCPCS: Performed by: INTERNAL MEDICINE

## 2021-01-21 PROCEDURE — 2580000003 HC RX 258: Performed by: INTERNAL MEDICINE

## 2021-01-21 PROCEDURE — 2500000003 HC RX 250 WO HCPCS: Performed by: INTERNAL MEDICINE

## 2021-01-21 PROCEDURE — 6370000000 HC RX 637 (ALT 250 FOR IP): Performed by: INTERNAL MEDICINE

## 2021-01-21 PROCEDURE — 36415 COLL VENOUS BLD VENIPUNCTURE: CPT

## 2021-01-21 PROCEDURE — 94761 N-INVAS EAR/PLS OXIMETRY MLT: CPT

## 2021-01-21 PROCEDURE — 85730 THROMBOPLASTIN TIME PARTIAL: CPT

## 2021-01-21 PROCEDURE — 85025 COMPLETE CBC W/AUTO DIFF WBC: CPT

## 2021-01-21 PROCEDURE — 93010 ELECTROCARDIOGRAM REPORT: CPT | Performed by: INTERNAL MEDICINE

## 2021-01-21 PROCEDURE — 2700000000 HC OXYGEN THERAPY PER DAY

## 2021-01-21 PROCEDURE — 2060000000 HC ICU INTERMEDIATE R&B

## 2021-01-21 PROCEDURE — 80053 COMPREHEN METABOLIC PANEL: CPT

## 2021-01-21 PROCEDURE — 6360000002 HC RX W HCPCS: Performed by: NURSE PRACTITIONER

## 2021-01-21 PROCEDURE — 99254 IP/OBS CNSLTJ NEW/EST MOD 60: CPT | Performed by: INTERNAL MEDICINE

## 2021-01-21 RX ORDER — HYDROMORPHONE HCL 110MG/55ML
1 PATIENT CONTROLLED ANALGESIA SYRINGE INTRAVENOUS EVERY 4 HOURS PRN
Status: DISCONTINUED | OUTPATIENT
Start: 2021-01-21 | End: 2021-01-23 | Stop reason: HOSPADM

## 2021-01-21 RX ORDER — MORPHINE SULFATE 2 MG/ML
2 INJECTION, SOLUTION INTRAMUSCULAR; INTRAVENOUS
Status: DISCONTINUED | OUTPATIENT
Start: 2021-01-21 | End: 2021-01-23 | Stop reason: HOSPADM

## 2021-01-21 RX ORDER — HEPARIN SODIUM 10000 [USP'U]/100ML
1850 INJECTION, SOLUTION INTRAVENOUS CONTINUOUS
Status: DISCONTINUED | OUTPATIENT
Start: 2021-01-21 | End: 2021-01-23 | Stop reason: HOSPADM

## 2021-01-21 RX ADMIN — VANCOMYCIN HYDROCHLORIDE 1500 MG: 10 INJECTION, POWDER, LYOPHILIZED, FOR SOLUTION INTRAVENOUS at 11:59

## 2021-01-21 RX ADMIN — HYDROMORPHONE HYDROCHLORIDE 1 MG: 2 INJECTION INTRAMUSCULAR; INTRAVENOUS; SUBCUTANEOUS at 16:59

## 2021-01-21 RX ADMIN — ACETAMINOPHEN 650 MG: 325 TABLET ORAL at 14:17

## 2021-01-21 RX ADMIN — HEPARIN SODIUM 3400 UNITS: 1000 INJECTION INTRAVENOUS; SUBCUTANEOUS at 15:33

## 2021-01-21 RX ADMIN — VANCOMYCIN HYDROCHLORIDE 1500 MG: 10 INJECTION, POWDER, LYOPHILIZED, FOR SOLUTION INTRAVENOUS at 00:25

## 2021-01-21 RX ADMIN — HEPARIN SODIUM 6700 UNITS: 1000 INJECTION INTRAVENOUS; SUBCUTANEOUS at 02:06

## 2021-01-21 RX ADMIN — HEPARIN SODIUM 1510 UNITS/HR: 10000 INJECTION, SOLUTION INTRAVENOUS at 02:07

## 2021-01-21 RX ADMIN — CEFEPIME HYDROCHLORIDE 2000 MG: 2 INJECTION, POWDER, FOR SOLUTION INTRAVENOUS at 14:21

## 2021-01-21 RX ADMIN — METRONIDAZOLE 500 MG: 500 INJECTION, SOLUTION INTRAVENOUS at 08:11

## 2021-01-21 RX ADMIN — CEFEPIME HYDROCHLORIDE 2000 MG: 2 INJECTION, POWDER, FOR SOLUTION INTRAVENOUS at 22:24

## 2021-01-21 RX ADMIN — METRONIDAZOLE 500 MG: 500 INJECTION, SOLUTION INTRAVENOUS at 00:25

## 2021-01-21 RX ADMIN — VANCOMYCIN HYDROCHLORIDE 1500 MG: 10 INJECTION, POWDER, LYOPHILIZED, FOR SOLUTION INTRAVENOUS at 22:24

## 2021-01-21 RX ADMIN — METRONIDAZOLE 500 MG: 500 INJECTION, SOLUTION INTRAVENOUS at 23:45

## 2021-01-21 RX ADMIN — METRONIDAZOLE 500 MG: 500 INJECTION, SOLUTION INTRAVENOUS at 15:32

## 2021-01-21 ASSESSMENT — ENCOUNTER SYMPTOMS
ABDOMINAL PAIN: 0
SHORTNESS OF BREATH: 1
VOICE CHANGE: 0
DIARRHEA: 0
EYE DISCHARGE: 0
COUGH: 1
STRIDOR: 0
EYE PAIN: 0
SORE THROAT: 0
CHEST TIGHTNESS: 0
EYE ITCHING: 0
CHOKING: 0
CONSTIPATION: 0

## 2021-01-21 ASSESSMENT — PAIN DESCRIPTION - PAIN TYPE: TYPE: ACUTE PAIN

## 2021-01-21 ASSESSMENT — PAIN SCALES - GENERAL
PAINLEVEL_OUTOF10: 7
PAINLEVEL_OUTOF10: 8
PAINLEVEL_OUTOF10: 8

## 2021-01-21 ASSESSMENT — PAIN DESCRIPTION - LOCATION: LOCATION: BACK;CHEST

## 2021-01-21 NOTE — ED NOTES

## 2021-01-21 NOTE — CONSULTS
Pharmacy to dose IV Vanco:  Please give 1500mg IV Vanco Q12hrs to provide Gram+ organism coverage to include MRSA. Will check a Trough 1/22 at 1030. Rukhsana Tinoco PharmD 1/20/2021 10:48 PM      Vancomycin Day 3  Current Dosing: Vancomycin 1500 mg IVPB q12h  Vancomycin trough = 15.9 mcg/mL at 1032  Estimated Creatinine Clearance: 132 mL/min (A) (based on SCr of 0.7 mg/dL (L)).   NGTD  Continue current dose & monitor  Chyrl Orf 1/22/2021 11:48 AM

## 2021-01-21 NOTE — ED NOTES
Called Cardiothoracic Surgery @ 2011  RE: lung cancer, bilateral pleural effusions  Dr. Christine Sanchez called back @ 2025        Metjesi Hou  01/20/21 2033

## 2021-01-21 NOTE — ED NOTES
Pt remains alert and oriented with no change in condition. Pt laying in bed attempting to sleep at this time.      Jf Parra, FERMIN  01/20/21 5026

## 2021-01-21 NOTE — CONSULTS
Pulmonary & Critical Care Consultation Note   Coral Kawasaki, MD    REASONFOR CONSULTATION/CC: pleural effusions, PE, lung cancer    Consult at request of  Echo Larsen DO  PCP: No primary care provider on file. HISTORYOF PRESENT ILLNESS:    60y.o. year old male with advanced stage mesothelioma, recently diagnosed at [facility] and started on chemotherapy with oncology. Had asbestos exposure in the past.   He developed worsening shortness of breath at home for the past 2 days, constant at rest. Associated nonproductive cough. Has diffuse chest discomfort. Had a thoracentesis on the right done 12/24, states that this was very uncomfortable. COVID rapid was negative  Seen today with IM, had ongoing pain complaints and is contemplating a change of code status but wished to discuss further with oncology      Past Medical History:   Diagnosis Date    Depression        Past Surgical History:   Procedure Laterality Date     LYMPH NODE BIOPSY  12/29/2020     LYMPH NODE BIOPSY 12/29/2020 [facility]       family history is not on file.     Social History     Tobacco Use    Smoking status: Never Smoker    Smokeless tobacco: Never Used   Substance Use Topics    Alcohol use: Not Currently     Alcohol/week: 77.0 standard drinks     Types: 70 Cans of beer, 7 Shots of liquor per week     Comment: sober since 08-        Scheduled Meds:   sodium chloride flush  10 mL Intravenous 2 times per day    cefepime  2,000 mg Intravenous Q12H    metroNIDAZOLE  500 mg Intravenous Q8H    vancomycin  1,500 mg Intravenous Q12H       Continuous Infusions:   heparin (Porcine) 1,678 Units/hr (01/21/21 1600)       PRN Meds:   sodium chloride flush, potassium chloride, magnesium sulfate, promethazine **OR** ondansetron, famotidine, acetaminophen **OR** acetaminophen, heparin (porcine), heparin (porcine)   Inpatient consult to Pulmonology  Consult performed by: Shahbaz Syed MD  Consult ordered by: ALEXANDRE Palacios    Inpatient consult to Pulmonology  Consult performed by: Taurus Ahumada MD  Consult ordered by: Adelina Flowers PA-C        ALLERGIES:  Patient has No Known Allergies. REVIEW OF SYSTEMS:  Review of Systems   Constitutional: Negative for chills, fever and unexpected weight change. HENT: Negative for mouth sores, sore throat and voice change. Eyes: Negative for pain, discharge and itching. Respiratory: Positive for cough and shortness of breath. Negative for choking, chest tightness and stridor. Cardiovascular: Negative for chest pain, palpitations and leg swelling. Gastrointestinal: Negative for abdominal pain, constipation and diarrhea. Endocrine: Negative for cold intolerance, heat intolerance and polydipsia. Genitourinary: Negative for dysuria, frequency and hematuria. Musculoskeletal: Negative for gait problem, joint swelling and neck stiffness. Neurological: Negative for dizziness, numbness and headaches. Psychiatric/Behavioral: Negative for agitation, confusion and hallucinations. Objective:   PHYSICAL EXAM:  /78   Pulse 104   Temp 97.6 °F (36.4 °C) (Oral)   Resp 24   Ht 6' 1\" (1.854 m)   Wt 200 lb (90.7 kg)   SpO2 95%   BMI 26.39 kg/m²    Physical Exam  Constitutional:       General: He is not in acute distress. Appearance: He is well-developed. He is ill-appearing. He is not diaphoretic. HENT:      Head: Normocephalic and atraumatic. Mouth/Throat:      Pharynx: No oropharyngeal exudate. Eyes:      Pupils: Pupils are equal, round, and reactive to light. Neck:      Musculoskeletal: Neck supple. Vascular: No JVD. Cardiovascular:      Heart sounds: Normal heart sounds. No murmur. No friction rub. No gallop. Pulmonary:      Effort: Pulmonary effort is normal.      Breath sounds: Rales present. No wheezing. Abdominal:      General: Bowel sounds are normal. There is no distension. Palpations: Abdomen is soft. Tenderness:  There is no abdominal tenderness. Musculoskeletal: Normal range of motion. General: Swelling present. Comments: RUE swelling   Lymphadenopathy:      Cervical: No cervical adenopathy. Skin:     General: Skin is warm and dry. Findings: No rash. Neurological:      Mental Status: He is alert. Cranial Nerves: No cranial nerve deficit. Comments: CN 2-12 grossly intact            Data Reviewed:   LABS:  CBC:   Recent Labs     01/20/21  1620 01/21/21  0545   WBC 16.4* 11.2*   HGB 12.2* 11.7*   HCT 37.4* 36.2*   MCV 81.5 81.1   * 638*     BMP:   Recent Labs     01/20/21  1620 01/21/21  0545   * 138   K 5.0 5.2*    104   CO2 27 29   BUN 15 15   CREATININE <0.5* 0.7*     LIVER PROFILE:   Recent Labs     01/20/21  1620 01/21/21  0545   AST 9* 8*   ALT 8* 8*   BILITOT 0.3 <0.2   ALKPHOS 136* 115     PT/INR:   Recent Labs     01/20/21  1620   PROTIME 15.1*   INR 1.30*     APTT:  Recent Labs     01/21/21  0025 01/21/21  0812 01/21/21  1422   APTT 30.5 52.2* 45.1*     UA:No results for input(s): NITRITE, COLORU, PHUR, LABCAST, WBCUA, RBCUA, MUCUS, TRICHOMONAS, YEAST, BACTERIA, CLARITYU, SPECGRAV, LEUKOCYTESUR, UROBILINOGEN, BILIRUBINUR, BLOODU, GLUCOSEU, AMORPHOUS in the last 72 hours. Invalid input(s): KETONESU  No results for input(s): PHART, AZU0UTH, PO2ART in the last 72 hours. Vent Information  SpO2: 95 %    CT chest personally reviewed, left sided pulmonary emboli, large right sided pleural effusion mediastinal and hilar adenopathy, esophageal thickening           Assessment:     1. Recurrent right pleural effusion, malignant  2. Mesothelioma, advanced stage  3. Provoked PE, from above  4. Chronic resp failure, hypoxic from above issues  5. Intractable pain    Plan:      -Discussed recurrent effusion, thoracic surgery input also noted.  Will request IR intervention for pleurX catheter placement.   -Anticoagulation, agree with heparin until above can be facilitated  -Presenting leukocytosis and elevated pct in an immune compromised patient. Agree with empiric atb.  Plan to de-escalate after 5 days      Freddy Brenner MD

## 2021-01-21 NOTE — CONSULTS
Department of Cardiovascular & Thoracic Surgery  History and Physical          DIAGNOSIS: Metastatic mesothelioma, chronic right and new small to moderate left pleural effusion    CHIEF COMPLAINT: Mild shortness of breath    History Obtained From:  patient, electronic medical record    HISTORY OF PRESENT ILLNESS:      The patient is a 60 y.o. male with significant past medical history of recently diagnosed metastatic mesothelioma who was sent to the emergency department after he underwent his first chemotherapy treatment at his oncologist office earlier today where he was found to have mild hypoxia. Upon arrival to the ED here he required 4 L nasal cannula to maintain sats above 95%. Breathing very comfortably, he is not tachypneic and he was not using any accessory muscles. He is mildly tachycardic. Work-up here included a CT scan of the chest which showed stable large right-sided pleural effusion with compression of the right lung and a new mild to moderate sized left effusion. We were asked to evaluate the patient for management of his malignant bilateral pleural effusions. Patient states that he has some mild shortness of breath which has been bothering him for the last year or so and he underwent an ultrasound-guided thoracentesis on [facility] with 800 cc of fluid being drained with no lung reexpansion after that. He has had  an axillary lymph node biopsy done which showed metastatic mesothelioma, epithelioid type. Past Medical History:        Diagnosis Date    Depression     Metastatic Mesothelioma    Past Surgical History:        Procedure Laterality Date    US LYMPH NODE BIOPSY  12/29/2020    US LYMPH NODE BIOPSY 12/29/2020 [facility]       Medications:   No current facility-administered medications for this encounter.       Current Outpatient Medications   Medication Sig Dispense Refill    oxyCODONE-acetaminophen (PERCOCET) 5-325 MG per tablet TAKE 1 TABLET BY MOUTH EVERY 4 TO 6 HOURS AS NEEDED FOR PAIN      ELIQUIS 5 MG TABS tablet TAKE 1 TABLET BY MOUTH TWICE A DAY      folic acid (FOLVITE) 1 MG tablet TAKE 1 TABLET BY MOUTH EVERY DAY      sennosides-docusate sodium (SENOKOT-S) 8.6-50 MG tablet Take 2 tablets by mouth daily 30 tablet 0       Allergies:  Patient has no known allergies. Social History:    TOBACCO:   reports that he has never smoked. He has never used smokeless tobacco.  ETOH:   reports previous alcohol use of about 77.0 standard drinks of alcohol per week. CAFFEINE ABUSE:  No  DRUGS:   reports previous drug use. Frequency: 7.00 times per week. Drug: Marijuana. Family History:    History reviewed. No pertinent family history.     REVIEW OF SYSTEMS:    CONSTITUTIONAL:  weight loss  DERMATOLOGICAL: negative  NEUROLOGICAL:  negative  EYES:  negative  HEENT:  negative  RESPIRATORY:  positive for  dry cough, dyspnea and pleuritic pain  CARDIOVASCULAR:  positive for  edema  GASTROINTESTINAL:  negative  GENITO-URINARY: no dysuria, trouble voiding, or hematuria  ENDOCRINE: negative  MUSCULOSKELETAL:  negative  HEMATOLOGICAL AND LYMPHATIC: positive for - swollen lymph nodes  IMMUNOLOGICAL: negative  PSYCHOLOGICAL: negative    PHYSICAL EXAM:    VITALS:  /76   Pulse 117   Temp 98 °F (36.7 °C) (Oral)   Resp 19   Ht 6' 1\" (1.854 m)   Wt 200 lb (90.7 kg)   SpO2 94%   BMI 26.39 kg/m²     Eyes:  lids and lashes normal, pupils equal, round and reactive to light, extra ocular muscles intact, sclera clear, conjunctiva normal    Head/ENT:  normal teeth, gums, & palate    Neck:  supple, symmetrical, trachea midline, lymphadenopathy noted right axillary, no jugular venous distension, no carotid bruits and MASSES:  no masses    Lungs:  no increased work of breathing, mild air exchange, no retractions and diminished breath sounds right apex, right base, right middle lobe and left base    Cardiovascular:  regular rate and rhythm, S1, S2 normal, no murmur, click, rub or patient require intervention while on anticoagulation, consideration could be given to a repeat ultrasound-guided thoracentesis. I discussed this case with admitting hospitalist and I told him that should the patient had respiratory decompensation, would require endotracheal intubation with positive pressure ventilation which could help with better lung aeration and I would be happy to perform the thoracentesis myself if he does not feel comfortable doing it. Due to the fact that the patient is on anticoagulation and he has a thickened pleura due to his mesothelioma and the risk of bleeding and cancer seeding of the chest tube tract,a surgically placed chest tube would be the least desirable option. In addition, the patient experienced excruciating pain just from his thoracentesis and I can only  Imagine that a surgical chest tube placement, even under conscious sedation, would cause excruciating pain on this unfortunate patient who has a stage IV mesothelioma. An extensive discussion needs to take place tomorrow with the patient's oncologist in regards with the management of this chronic right and new left effusion. Pulmonary consult would also be useful. Recommend holding anticoagulation with Eliquis for now and switching to heparin drip oat least until final plan is made in regards with possible procedures later during this hospital stay (such as Pleurx catheter placement) . There is no need for emergent thoracic surgical intervention at this point. All the above were discussed with the patient and all of his questions were answered.  CVTS will follow along and make further recommendations as needed    Dr. Nitin Ragsdale, thank you very much for allowing us to participate in the care of this patient        Gali Carmona  1/20/2021  9:54 PM  Personally reviewed the CT scan unfortunately with the right side trapped pleural effusion at the cancer, mesothelioma trapped lung would never expand with tapping the fluid I do not recommend placing any tube or catheter, we could try left side tap first if patient respiratory status is deterioration if expanded and reaccumulate quickly we could advertise Pleurx patient's long-term life expectancy is short will follow with the primary team

## 2021-01-21 NOTE — CARE COORDINATION
[id number] What Type Of Note Output Would You Prefer (Optional)?: Standard Output Guadalupe Rad Guadalupe Rad Guadalupe Rad ........................................................................................................................................................................................................................................................ CASE MANAGEMENT INITIAL ASSESSMENT      Reviewed chart and completed assessment via telephone with:  Explained Case Management role/services. to patient    Primary contact information:See [facility] Decision Maker :   Primary Decision Maker: Yulia Tomlinson - Parent - [phone]    Secondary Decision Maker: Rena Wise - Child - [phone]          Can this person be reached and be able to respond quickly, such as within a few minutes or hours?  Yes    Admit date/status:Inpatient  Diagnosis: Acute respiratory Hpi Title: Evaluation of Skin Lesions How Severe Are Your Spot(S)?: moderate failure  Is this a Readmission?:  Yes      [address] required for SNF: Yes       3 night stay required: No    Living arrangements, Adls, care needs, prior to admission:Lives at home with his mother and daughter    Transportation:TBD    Durable Medical Equipment at home:  Walker__Cane__RTS__ BSC__Shower Chair__  02__ HHN__ CPAP__  BiPap__  Hospital Bed__ W/C___ Other__________    Services in the home and/or outpatient, prior to admission: Goes to chemotherapy at [facility] outpatient    PT/OT recs:Not seen     Hospital Exemption Notification (HEN):N/A    Barriers to discharge:None identified    Plan/comments:Patient was just here in December. At that time he was in [facility] and was discharged back to group home. He states he is no longer in group home and has been discharged back home. He has a new cancer diagnosis and has been getting chemotherapy at [facility] outpatient. He was sent to ED after his chemo because he was short of breath and found to be hypoxic. Patient plans to return home at discharge. Currently requiring 4 liters per nasal cannula and is not on oxygen at home. Monitor for home oxygen needs and any specialty recommendations.      ECOC on chart for MD signature Have Your Spot(S) Been Treated In The Past?: has not been treated

## 2021-01-21 NOTE — ED NOTES
Pt's bed lowered down for comfort. Pt denies further needs at this time.      Juan Ramon Pennington RN  01/20/21 8968

## 2021-01-21 NOTE — ACP (ADVANCE CARE PLANNING)
referral for advance directive information. Patient currently in isolation/COVID rule out. RN brought forms into patient room. He is currently not available. We will call at a later time to provide education and help him complete documents if he desires and is able.

## 2021-01-21 NOTE — H&P
Hospital Medicine History & Physical      PCP: No primary care provider on file. Date of Admission: 1/20/2021    Date of Service: Pt seen/examined on 1/20/2021  Pt seen/examined face to face on and admitted as inpatient with expected LOS greater than two midnights due to medical therapy  Chief Complaint:    Chief Complaint   Patient presents with    Shortness of Breath     Pt to ED from oncology tx. Pt has hx of stage 4 lung cx and was receiving first tx when his oxygen decreased. Pt on 4L of O2 via NC with an oxygen saturation of 99%. Pt's oxygen 78% on RA. History Of Present Illness:      60 y.o. male who presented to [facility] with past medical history of depression, presented to the ED for worsening shortness of breath. 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 During this admission patient was noted to have a primary lung cancer with lymph biopsy performed showing mesothelioma. Patient also was noted to have acute PE and was started on Eliquis, which the patient reports compliance with it. Patient was discharged and on follow-up with his oncologist where he started chemotherapy today unknown exact medication. Patient reported he tolerated the medication well however was noted to have worsening shortness of breath and was noted to be hypoxic this was sent to the ED for further evaluation. Patient reports that the shortness of breath has worsened significantly the past 2 days limiting his ability to be functional.  Exacerbated on exertion, alleviated with laying still no association of high fevers chills nausea vomiting abdominal pain dysuria. Patient does report intermittent cough.   CODE STATUS was discussed with the patient and the patient proceeded with a full code. After discussion with the patient and reviewing the CT imaging with the patient at bedside. After discussion patient was amenable to talk to palliative care in addition to have  to discuss living will. Past Medical History:          Diagnosis Date    Depression        Past Surgical History:          Procedure Laterality Date    US LYMPH NODE BIOPSY  12/29/2020    US LYMPH NODE BIOPSY 12/29/2020 [facility] ULTRASOUND       Medications Prior to Admission:      Prior to Admission medications    Medication Sig Start Date End Date Taking? Authorizing Provider   oxyCODONE-acetaminophen (PERCOCET) 5-325 MG per tablet TAKE 1 TABLET BY MOUTH EVERY 4 TO 6 HOURS AS NEEDED FOR PAIN 1/14/21  Yes Historical Provider, MD   ELIQUIS 5 MG TABS tablet TAKE 1 TABLET BY MOUTH TWICE A DAY 1/14/21  Yes Historical Provider, MD   folic acid (FOLVITE) 1 MG tablet TAKE 1 TABLET BY MOUTH EVERY DAY 1/14/21  Yes Historical Provider, MD   sennosides-docusate sodium (SENOKOT-S) 8.6-50 MG tablet Take 2 tablets by mouth daily 1/1/21   Ema Leiva MD       Allergies:  Patient has no known allergies. Social History:          TOBACCO:   reports that he has never smoked. He has never used smokeless tobacco.  ETOH:   reports previous alcohol use of about 77.0 standard drinks of alcohol per week. E-Cigarettes/Vaping Use     Questions Responses    E-Cigarette/Vaping Use Never User    Start Date     Passive Exposure     Quit Date     Counseling Given     Comments             Family History:      Reviewed in detail     History reviewed. No pertinent family history.     REVIEW OF SYSTEMS:     Constitutional:  No Fever, No Chills, No Night Sweats  ENT/Mouth:  No Nasal Congestion,  No Hoarseness, No new mouth lesion  Eyes:  No Eye Pain, No Redness, No Discharge  Cardiovascular:  No Chest Pain, No Orthopnea, No Palpitations  Respiratory: + Cough, No Sputum, + dyspnea  Gastrointestinal: No Vomiting, No Diarrhea, No abdominal emboli within the left lower lobe. New left upper lobe   pulmonary embolus in a 1st or 2nd order branch. No evidence of right heart   strain. 2. Stable large malignant pleural collection on the right with collapse of   the right lung. New moderate left pleural effusion with diffuse interstitial   edema, possibly lymphangitic tumor. 3. Progressive extensive mediastinal and bilateral hilar adenopathy as well   as right axillary adenopathy. Metastatic adenopathy in the upper abdomen   with small quantity of perihepatic ascites. 4. Diffuse esophageal wall thickening, likely an esophagitis. Findings were discussed with Dr. José Miguel Vu at 6:01 pm on 1/20/2021. XR CHEST PORTABLE   Preliminary Result   Stable chest x-ray compared to January 18, 2021. Redemonstration of complete opacification of the right hemithorax with mild   mediastinal shift towards the left. Persistent vascular congestive changes are present on the left. ASSESSMENT AND PLAN:    Active Hospital Problems    Diagnosis Date Noted    Acute respiratory failure ([facility] [address] [J96.00] 01/20/2021     Acute mixed respiratory failure:  Secondary to worsening lung findings of left pleural effusion and right lung collapse. VBG noted hypercapnia, patient currently on 4 L. Responding to oxygen currently  Continue to monitor    New left pleural effusion:  Given patient's presentation of acute respiratory failure with new pleural effusion. I contacted CT surgery for further assistance given patient is anticoagulated and if patient were to decompensate overnight will need support and expertise. CT surgery recommended I spoke with IR. I contacted IR and connected IR with CT surgery. CT surgery then evaluate the patient in the ED. On discussion to the patient the plan was to admit and continue to monitor the patient. If patient is to decline overnight, CT surgery notified me that he will be on standby.   I consulted pulmonary, oncology for further assistance  Unclear patient would benefit from Pleurx catheter    Metastatic mesothelioma:  Worsening, patient received his first chemotherapy today  Oncology consulted for further information and prognosis  Palliative care consulted    Sepsis secondary to suspected pneumonia:  Postobstructive  Blood cultures pending  Currently on Vanco cefepime and metronidazole    Pneumonia:  Likely postobstructive secondary to mesothelioma  On broad-spectrum antibiotic  Covid test came back negative    Acute PE noted on 12/31/2020 was put on Eliquis  Changed to heparin pending CT, pulmonary eval     diet: NPO except meds ordered    DVT Prophylaxis: heparin  Dispo:   Expected LOS greater than two Cara 1153, DO

## 2021-01-21 NOTE — PROGRESS NOTES
Received patient from ED alert and oriented times 4 pleasant extremely short of breath on oxygen at 4 liters nasal cannula. Right arm extremely edematous to shoulder patient states this is worse than before Monitor number 20 applied with continuous pulse ox oriented to room and treatment plan.

## 2021-01-21 NOTE — PROGRESS NOTES
01/21/21 @ 1520 PerfectServed Dr. Kirby Sandy for radiology consult. Rajani Arceo       01/21/21 @ 5 PerfectServed Dr. Juanito Walden for oncology consult.  [facility]

## 2021-01-21 NOTE — CONSULTS
High dose heparin GTT protocol:  Transition from Eliquis. Baseline aPTT pending. If less than 36 seconds give a 6,700 unit IV Bolus dose and begin the infusion at 15. 1mL/hr. Recheck the aPTT 6hrs later. ABW/ dosing weight is 84kg, Therapeutic aPTT is 49-76 seconds. Aleta Galeazzi PharmD 1/20/2021 10:39 PM    APTT [[id number]]    Collected: 01/21/21 0025    Updated: 01/21/21 0045    Specimen Source: Blood     aPTT 30.5 sec     6700 bolus; Rate = 15.1 ml/hr  Next aptt 0800  Anastacio Habermann, Pharm D.1/21/2021 1:49 AM    1/21 @0812  Aptt = 52.2 sec  Continue same rate  Check aPTT @1400  [facility], R. Ph. 1/21/2021 9:21 AM    1/21 @1422  Aptt = 45.1 sec  Give 3400 unit bolus and increase rate to 16.8 ml/hr  Next aPTT @2100  [facility], R. Ph. 1/21/2021 3:16 PM    1/21  2220  High dose Heparin:  APTT at 2055 is 71.1 seconds which is therapeutic (49-76 seconds) No changes, no bolus. Continue infusion at 16.8mL/hr and recheck the aPTT at 0300. Aleta Galeazzi PharmD 1/21/2021 10:17 PM    1/22 0333  aptt = 56.6 sec  Continue current rate. Next aptt 1/23  Anastacio Habermann, Pharm D.1/22/2021 5:14 AM    1/23 0529  aptt = 47.3 sec  Rate = 18.5 ml/hr.   Next aptt 4698 Dahlia Crawford Pharm D.1/23/2021 7:12 AM

## 2021-01-22 ENCOUNTER — APPOINTMENT (OUTPATIENT)
Dept: VASCULAR LAB | Age: 56
DRG: 694 | End: 2021-01-22
Payer: MEDICAID

## 2021-01-22 LAB
A/G RATIO: 0.7 (ref 1.1–2.2)
ALBUMIN SERPL-MCNC: 2.6 G/DL (ref 3.4–5)
ALP BLD-CCNC: 102 U/L (ref 40–129)
ALT SERPL-CCNC: 17 U/L (ref 10–40)
ANION GAP SERPL CALCULATED.3IONS-SCNC: 7 MMOL/L (ref 3–16)
APTT: 56.6 SEC (ref 24.2–36.2)
AST SERPL-CCNC: 20 U/L (ref 15–37)
BASOPHILS ABSOLUTE: 0.1 K/UL (ref 0–0.2)
BASOPHILS RELATIVE PERCENT: 0.5 %
BILIRUB SERPL-MCNC: <0.2 MG/DL (ref 0–1)
BUN BLDV-MCNC: 24 MG/DL (ref 7–20)
CALCIUM SERPL-MCNC: 8.5 MG/DL (ref 8.3–10.6)
CHLORIDE BLD-SCNC: 99 MMOL/L (ref 99–110)
CO2: 27 MMOL/L (ref 21–32)
CREAT SERPL-MCNC: 0.7 MG/DL (ref 0.9–1.3)
EOSINOPHILS ABSOLUTE: 0.1 K/UL (ref 0–0.6)
EOSINOPHILS RELATIVE PERCENT: 0.5 %
FERRITIN: 838.4 NG/ML (ref 30–400)
GFR AFRICAN AMERICAN: >60
GFR NON-AFRICAN AMERICAN: >60
GLOBULIN: 3.5 G/DL
GLUCOSE BLD-MCNC: 96 MG/DL (ref 70–99)
HCT VFR BLD CALC: 36.6 % (ref 40.5–52.5)
HEMOGLOBIN: 11.8 G/DL (ref 13.5–17.5)
IRON SATURATION: ABNORMAL % (ref 20–50)
IRON: 160 UG/DL (ref 59–158)
LV EF: 58 %
LVEF MODALITY: NORMAL
LYMPHOCYTES ABSOLUTE: 0.6 K/UL (ref 1–5.1)
LYMPHOCYTES RELATIVE PERCENT: 4.5 %
MCH RBC QN AUTO: 26.4 PG (ref 26–34)
MCHC RBC AUTO-ENTMCNC: 32.3 G/DL (ref 31–36)
MCV RBC AUTO: 81.6 FL (ref 80–100)
MONOCYTES ABSOLUTE: 0.4 K/UL (ref 0–1.3)
MONOCYTES RELATIVE PERCENT: 3.3 %
NEUTROPHILS ABSOLUTE: 12.4 K/UL (ref 1.7–7.7)
NEUTROPHILS RELATIVE PERCENT: 91.2 %
PDW BLD-RTO: 18.3 % (ref 12.4–15.4)
PLATELET # BLD: 640 K/UL (ref 135–450)
PMV BLD AUTO: 6.2 FL (ref 5–10.5)
POTASSIUM REFLEX MAGNESIUM: 4.3 MMOL/L (ref 3.5–5.1)
RBC # BLD: 4.48 M/UL (ref 4.2–5.9)
SODIUM BLD-SCNC: 133 MMOL/L (ref 136–145)
TOTAL IRON BINDING CAPACITY: ABNORMAL UG/DL (ref 260–445)
TOTAL PROTEIN: 6.1 G/DL (ref 6.4–8.2)
VANCOMYCIN TROUGH: 15.9 UG/ML (ref 10–20)
WBC # BLD: 13.6 K/UL (ref 4–11)

## 2021-01-22 PROCEDURE — 85730 THROMBOPLASTIN TIME PARTIAL: CPT

## 2021-01-22 PROCEDURE — 2700000000 HC OXYGEN THERAPY PER DAY

## 2021-01-22 PROCEDURE — 6360000002 HC RX W HCPCS: Performed by: NURSE PRACTITIONER

## 2021-01-22 PROCEDURE — 99231 SBSQ HOSP IP/OBS SF/LOW 25: CPT | Performed by: NURSE PRACTITIONER

## 2021-01-22 PROCEDURE — 85025 COMPLETE CBC W/AUTO DIFF WBC: CPT

## 2021-01-22 PROCEDURE — 99232 SBSQ HOSP IP/OBS MODERATE 35: CPT | Performed by: INTERNAL MEDICINE

## 2021-01-22 PROCEDURE — 84238 ASSAY NONENDOCRINE RECEPTOR: CPT

## 2021-01-22 PROCEDURE — 6370000000 HC RX 637 (ALT 250 FOR IP): Performed by: NURSE PRACTITIONER

## 2021-01-22 PROCEDURE — 6360000002 HC RX W HCPCS: Performed by: INTERNAL MEDICINE

## 2021-01-22 PROCEDURE — 2580000003 HC RX 258: Performed by: INTERNAL MEDICINE

## 2021-01-22 PROCEDURE — 93306 TTE W/DOPPLER COMPLETE: CPT

## 2021-01-22 PROCEDURE — 83550 IRON BINDING TEST: CPT

## 2021-01-22 PROCEDURE — 82728 ASSAY OF FERRITIN: CPT

## 2021-01-22 PROCEDURE — 80202 ASSAY OF VANCOMYCIN: CPT

## 2021-01-22 PROCEDURE — 93971 EXTREMITY STUDY: CPT

## 2021-01-22 PROCEDURE — 2500000003 HC RX 250 WO HCPCS: Performed by: INTERNAL MEDICINE

## 2021-01-22 PROCEDURE — 36415 COLL VENOUS BLD VENIPUNCTURE: CPT

## 2021-01-22 PROCEDURE — 83540 ASSAY OF IRON: CPT

## 2021-01-22 PROCEDURE — 94761 N-INVAS EAR/PLS OXIMETRY MLT: CPT

## 2021-01-22 PROCEDURE — 2060000000 HC ICU INTERMEDIATE R&B

## 2021-01-22 PROCEDURE — 80053 COMPREHEN METABOLIC PANEL: CPT

## 2021-01-22 RX ORDER — FOLIC ACID 1 MG/1
1 TABLET ORAL DAILY
Status: DISCONTINUED | OUTPATIENT
Start: 2021-01-22 | End: 2021-01-23 | Stop reason: HOSPADM

## 2021-01-22 RX ORDER — LOPERAMIDE HYDROCHLORIDE 2 MG/1
2 CAPSULE ORAL 4 TIMES DAILY PRN
Status: DISCONTINUED | OUTPATIENT
Start: 2021-01-22 | End: 2021-01-23 | Stop reason: HOSPADM

## 2021-01-22 RX ORDER — LORAZEPAM 2 MG/ML
1 INJECTION INTRAMUSCULAR EVERY 4 HOURS PRN
Status: DISCONTINUED | OUTPATIENT
Start: 2021-01-22 | End: 2021-01-23 | Stop reason: HOSPADM

## 2021-01-22 RX ORDER — OXYCODONE HYDROCHLORIDE 5 MG/1
5 TABLET ORAL EVERY 4 HOURS PRN
Status: DISCONTINUED | OUTPATIENT
Start: 2021-01-22 | End: 2021-01-23 | Stop reason: HOSPADM

## 2021-01-22 RX ORDER — OXYCODONE HCL 10 MG/1
20 TABLET, FILM COATED, EXTENDED RELEASE ORAL EVERY 12 HOURS SCHEDULED
Status: DISCONTINUED | OUTPATIENT
Start: 2021-01-22 | End: 2021-01-23 | Stop reason: HOSPADM

## 2021-01-22 RX ADMIN — LORAZEPAM 1 MG: 2 INJECTION, SOLUTION INTRAMUSCULAR; INTRAVENOUS at 18:09

## 2021-01-22 RX ADMIN — Medication 10 ML: at 09:24

## 2021-01-22 RX ADMIN — OXYCODONE 5 MG: 5 TABLET ORAL at 18:09

## 2021-01-22 RX ADMIN — LIDOCAINE HYDROCHLORIDE 5 ML: 20 SOLUTION ORAL; TOPICAL at 20:31

## 2021-01-22 RX ADMIN — METRONIDAZOLE 500 MG: 500 INJECTION, SOLUTION INTRAVENOUS at 09:10

## 2021-01-22 RX ADMIN — MORPHINE SULFATE 2 MG: 2 INJECTION, SOLUTION INTRAMUSCULAR; INTRAVENOUS at 09:20

## 2021-01-22 RX ADMIN — VANCOMYCIN HYDROCHLORIDE 1500 MG: 10 INJECTION, POWDER, LYOPHILIZED, FOR SOLUTION INTRAVENOUS at 11:34

## 2021-01-22 RX ADMIN — LIDOCAINE HYDROCHLORIDE 5 ML: 20 SOLUTION ORAL; TOPICAL at 18:03

## 2021-01-22 RX ADMIN — CEFEPIME HYDROCHLORIDE 2000 MG: 2 INJECTION, POWDER, FOR SOLUTION INTRAVENOUS at 11:33

## 2021-01-22 RX ADMIN — HEPARIN SODIUM 1678 UNITS/HR: 10000 INJECTION, SOLUTION INTRAVENOUS at 09:21

## 2021-01-22 RX ADMIN — HYDROMORPHONE HYDROCHLORIDE 1 MG: 2 INJECTION INTRAMUSCULAR; INTRAVENOUS; SUBCUTANEOUS at 13:08

## 2021-01-22 RX ADMIN — Medication 10 ML: at 09:22

## 2021-01-22 RX ADMIN — FOLIC ACID 1 MG: 1 TABLET ORAL at 13:08

## 2021-01-22 RX ADMIN — OXYCODONE HYDROCHLORIDE 20 MG: 10 TABLET, FILM COATED, EXTENDED RELEASE ORAL at 20:31

## 2021-01-22 RX ADMIN — LIDOCAINE HYDROCHLORIDE 5 ML: 20 SOLUTION ORAL; TOPICAL at 13:08

## 2021-01-22 ASSESSMENT — PAIN SCALES - GENERAL
PAINLEVEL_OUTOF10: 10
PAINLEVEL_OUTOF10: 10
PAINLEVEL_OUTOF10: 8
PAINLEVEL_OUTOF10: 10
PAINLEVEL_OUTOF10: 10

## 2021-01-22 NOTE — CONSULTS
Hematology/Oncology Consultation         Patient:   Fouzia Sheppard       Reason for Consult:  Mesothelioma with diffuse mets   Requesting Physician: No ref. provider found    Chief Complaint:     Chief Complaint   Patient presents with    Shortness of Breath     Pt to ED from oncology tx. Pt has hx of stage 4 lung cx and was receiving first tx when his oxygen decreased. Pt on 4L of O2 via NC with an oxygen saturation of 99%. Pt's oxygen 78% on RA. History Obtained from:     patient, electronic medical record    History of Present Illness:     Fouzia Sheppard is a 60 y.o. male  with significant past medical history of stage 4 mesothelioma who presents with severe hypoxia from the office. He is now on broad spectrum ATB for presumed HCAP. He has bilateral pleural effusions and pleurx drain has been ordered. He is eager to leave the hospital. He is only s/p one cycle of chemo Cisplat and Alimta. He has thrush and dysphagia. He complains of watery diarrhea X 2 this AM. He is on hep drip for new PE.      Past Medical History:         Diagnosis Date    Depression        Past Surgical History:         Procedure Laterality Date    US LYMPH NODE BIOPSY  12/29/2020    US LYMPH NODE BIOPSY 12/29/2020 [address] ULTRASOUND       Current Medications:     Current Facility-Administered Medications   Medication Dose Route Frequency Provider Last Rate Last Admin    heparin 25,000 unit in sodium chloride 0.45% 250 mL (premix) infusion  1,678 Units/hr Intravenous Continuous Kiara Spaulding, DO 16.8 mL/hr at 01/22/21 0921 1,678 Units/hr at 01/22/21 0921    morphine (PF) injection 2 mg  2 mg Intravenous Q3H PRN CARTER Almonte - CNP   2 mg at 01/22/21 0920    HYDROmorphone (DILAUDID) injection 1 mg  1 mg Intravenous Q4H PRN CARTER Almonte - CNP   1 mg at 01/21/21 1659    sodium chloride flush 0.9 % injection 10 mL  10 mL Intravenous 2 times per day Jeremi Spaulding, DO   10 mL at 01/22/21 8495  sodium chloride flush 0.9 % injection 10 mL  10 mL Intravenous PRN Amanda Diallo A Jasson, DO        potassium chloride 10 mEq/100 mL IVPB (Peripheral Line)  10 mEq Intravenous PRN Duaine Jimenez Jasson, DO        magnesium sulfate 2000 mg in 50 mL IVPB premix  2,000 mg Intravenous PRN Amanda Yoel A Jasson, DO        promethazine (PHENERGAN) tablet 12.5 mg  12.5 mg Oral Q6H PRN Anyad A Jasson, DO        Or    ondansetron (ZOFRAN) injection 4 mg  4 mg Intravenous Q6H PRN Ahmad A Jasson, DO        famotidine (PEPCID) tablet 20 mg  20 mg Oral Daily PRN Duaine Jimenez Jasson, DO        acetaminophen (TYLENOL) tablet 650 mg  650 mg Oral Q6H PRN Ahmad A Jasson, DO   650 mg at 01/21/21 1417    Or    acetaminophen (TYLENOL) suppository 650 mg  650 mg Rectal Q6H PRN Duaine Jimenez Jasson, DO        heparin (porcine) injection 6,700 Units  6,700 Units Intravenous PRN Ahmad A Jasson, DO        heparin (porcine) injection 3,400 Units  3,400 Units Intravenous PRN Duaine Jimenez Jasson, DO   3,400 Units at 01/21/21 1533    cefepime (MAXIPIME) 2000 mg IVPB minibag  2,000 mg Intravenous Q12H Ahmad A Jasson, DO   Stopped at 01/21/21 2254    metronidazole (FLAGYL) 500 mg in NaCl 100 mL IVPB premix  500 mg Intravenous Q8H Kiara Spaulding,  mL/hr at 01/22/21 0910 500 mg at 01/22/21 0910    vancomycin 1500 mg in dextrose 5% 300 mL IVPB  1,500 mg Intravenous Q12H Ahmad A Jasson, DO   Stopped at 01/22/21 0111       Allergies:     No Known Allergies    Social History:     Social History     Socioeconomic History    Marital status:      Spouse name: Not on file    Number of children: Not on file    Years of education: Not on file    Highest education level: Not on file   Occupational History    Not on file   Social Needs    Financial resource strain: Not on file    Food insecurity     Worry: Not on file     Inability: Not on file    Transportation needs     Medical: Not on file     Non-medical: Not on file   Tobacco Use    Smoking status: Never Smoker    Smokeless tobacco: Never Used   Substance and Sexual Activity    Alcohol use: Not Currently     Alcohol/week: 77.0 standard drinks     Types: 70 Cans of beer, 7 Shots of liquor per week     Comment: sober since 08-    Drug use: Not Currently     Frequency: 7.0 times per week     Types: Marijuana     Comment: clean since 08/07/2020    Sexual activity: Not on file   Lifestyle    Physical activity     Days per week: Not on file     Minutes per session: Not on file    Stress: Not on file   Relationships    Social connections     Talks on phone: Not on file     Gets together: Not on file     Attends Gnosticism service: Not on file     Active member of club or organization: Not on file     Attends meetings of clubs or organizations: Not on file     Relationship status: Not on file    Intimate partner violence     Fear of current or ex partner: Not on file     Emotionally abused: Not on file     Physically abused: Not on file     Forced sexual activity: Not on file   Other Topics Concern    Not on file   Social History Narrative    Not on file     Social History     Substance and Sexual Activity   Drug Use Not Currently    Frequency: 7.0 times per week    Types: Marijuana    Comment: clean since 08/07/2020     Social History     Substance and Sexual Activity   Alcohol Use Not Currently    Alcohol/week: 77.0 standard drinks    Types: 70 Cans of beer, 7 Shots of liquor per week    Comment: sober since 08-     Social History     Substance and Sexual Activity   Sexual Activity Not on file     Social History     Tobacco Use   Smoking Status Never Smoker   Smokeless Tobacco Never Used       Family History:     History reviewed. No pertinent family history. Review of Systems:     Constitutional: Denies fever, sweats, weight loss. .     Eyes: No visual changes or diplopia. No scleral icterus. ENT: No Headaches, no hearing loss, no  vertigo. No mouth sores or sore throat.   Cardiovascular: No chest pain, no dyspnea on exertion, no palpitations, no  loss of consciousness. Respiratory: see HPI   Gastrointestinal: No abdominal pain, no appetite loss, no blood in stools. No change in bowel habits. Genitourinary: No dysuria, trouble voiding, or hematuria. Musculoskeletal:  Generalized weakness. No joint complaints. Integumentary: No rash or pruritis. Neurological: No headache, diplopia. No change in gait, balance, or coordination. No paresthesias. Endocrine: No temperature intolerance. No excessive thirst, fluid intake, or urination. Hematologic/Lymphatic: No abnormal bruising or ecchymoses, no blood clots or swollen lymph nodes. Allergic/Immunologic: No nasal congestion or hives. Physical Exam:     /85   Pulse 117   Temp 97.8 °F (36.6 °C) (Oral)   Resp 20   Ht 6' 1\" (1.854 m)   Wt 191 lb 12.8 oz (87 kg)   SpO2 96%   BMI 25.30 kg/m²     CONSTITUTIONAL: awake, alert, cooperative, no apparent distress. EYES:  Pupils equal, round and reactive to light, sclera non-icteric, conjunctiva normal  ENT:  Normocephalic, without obvious abnormality, atraumatic, sinuses nontender on palpation, external ears without lesions, oral pharynx with moist mucus membranes, no mucositis.   NECK:  Supple, symmetrical, trachea midline, no adenopathy, thyroid symmetric, not enlarged and no tenderness, skin normal  HEMATOLOGIC/LYMPHATICS:  no cervical lymphadenopathy, no supraclavicular lymphadenopathy, no axillary lymphadenopathy and no inguinal lymphadenopathy  BACK:  Symmetric, no curvature, spinous processes are non-tender on palpation, paraspinous muscles are non-tender on palpation, no costal vertebral tenderness  LUNGS:  Diminished bases bilaterally, crackles to RML   CARDIOVASCULAR:  Regular rate and rhythm, normal S1 and S2, no S3 or S4, and no murmur noted  ABDOMEN:  Normal bowel sounds, soft, non-distended, non-tender, no masses palpated, no hepatosplenomegally  MUSCULOSKELETAL:  There is no redness, warmth, or swelling of the joints  NEUROLOGIC:   No focal findings. SKIN:  Scattered bruising and ecchymoses. EXT: without clubbing, cyanosis or edema. Data:     CBC:  Recent Labs     21  0333 21  0545 21  1620   WBC 13.6* 11.2* 16.4*   HGB 11.8* 11.7* 12.2*   HCT 36.6* 36.2* 37.4*   MCV 81.6 81.1 81.5   * 638* 688*       BMP:  Recent Labs     21  0333 21  0545 21  1620   * 138 131*   K 4.3 5.2* 5.0   CO2 27 29 27   BUN 24* 15 15   CREATININE 0.7* 0.7* <0.5*       HEPATIC:  Recent Labs     21  0333 21  0545 21  1620   AST 20 8* 9*   ALT 17 8* 8*   ALKPHOS 102 115 136*   PROT 6.1* 6.0* 6.5   BILITOT <0.2 <0.2 0.3       TUMOR MARKERS:  No results for input(s): PSA, CEA, , TG8789,  in the last 720 hours. MAGNESIUM:    No results found for: MG    PT/INR:    No results found for: PTINR    Lab Results   Component Value Date    INR 1.30 2021       PTT:    Lab Results   Component Value Date    APTT 56.6 2021       U/A:    Lab Results   Component Value Date    COLORU Yellow 2020    PHUR 5.0 2020    CLARITYU Clear 2020    SPECGRAV 1.025 2020    LEUKOCYTESUR Negative 2020    UROBILINOGEN 0.2 2020    BILIRUBINUR Negative 2020    BLOODU Negative 2020    GLUCOSEU Negative 2020       Imagin. Stable pulmonary emboli within the left lower lobe.  New left upper lobe   pulmonary embolus in a 1st or 2nd order branch.  No evidence of right heart   strain. 2. Stable large malignant pleural collection on the right with collapse of   the right lung.  New moderate left pleural effusion with diffuse interstitial   edema, possibly lymphangitic tumor. 3. Progressive extensive mediastinal and bilateral hilar adenopathy as well   as right axillary adenopathy.  Metastatic adenopathy in the upper abdomen   with small quantity of perihepatic ascites.    4. Diffuse esophageal wall thickening, likely an esophagitis. Findings were discussed with Dr. Duanne Boast at 6:01 pm on 1/20/2021. Impression:     See below     Plan:     Mesothelioma with diffuse mets (liver and peritoneal carcinomatosis)   - due for cycle 2 chemo on 2/10- Cisplat Alimta   - FOlate replacement     Acute Resp Failure; bilateral pleural effusions R>L   - plans for pleurx drain   - home O2     Thrush/Esophagitits   - Nystatin ordered     Diarrhea  - Imodium PRN     DVT R arm /PE   - hep drip   - Eliquis PTA - appears to have failed this with new PE in the LUE- no signs of right heart strain     Anemia with thrombocytosis  - check iron labs     Agree with palliative care consult     MD to see over the weekend. Thank you very much for allowing me to participate in the care of this patient.     Isai Cagle, CNP   Medical Oncology/Hematology    [facility] - [address], [facility] Kale Bautista   Phone: [phone]  Fax: [phone]

## 2021-01-22 NOTE — CONSULTS
Palliative Care Initial Note  Palliative Care Admit date:  1/22/21  Reason for c/s:  GOC, Code status, Sx mgt    Advance Directives: pt has not executed AD's. His mother, Anita Mixon, has been designated as his primary contact and his dtr, Juancho Maloney, his secondary contact. However, pts wife is listed and would be viewed as his NOK in light of the [place] order of decision making unless pt elects to designate another proxy.  has rec'd ref for AD'S. Pt currently has a full code status. With pts permission, we discussed that resuscitative interventions, even if successful, would not yield meaningful benefit. It was apparent that his dtr, Hallie Weinberg, had discussed the challenges to him if intubated for MV support. He will continue to contemplate his wishes. Plan of care/goals:  Met w/ Suzy Darden @  and his dtr, Hallie Weinberg, was present; he was very receptive to writer's visit. Suzy Darden has had the benefit of d/w hospitalist and Onc NP & his understanding is that \"things aren't good. \"  The lingering question in his mind is if more treatment/chemo is available as his expressed goal is to prolong his life. He will d/w heme-onc tomorrow. Have had f/u d/w hospitalist and Onc NP who will apprise Onc of pts expressed wish from today's discussion. Suzy Katalina was receptive to advancing our discussion, should he not be appropriate for more treatment, to the option of hospice though we did not belabor. Answered dtr's hospice questions. Pt states \"I eat very little, I have pain all the time and I'm always SOB. \"  It did seem as though pt was comfortable at the outset of our discussion though became less so by the end. Shift RN apprised and gave prn hydromorphone per pts request.   He shared that morphine doesn't seem too effective but, when he was @ home, oxycodone was very effective. Apparently, there were also financial issues w/ obtaining analgesia (which would not be an issue if pt elects hospice).    Over

## 2021-01-22 NOTE — PROGRESS NOTES
Hospitalist Progress Note      PCP: No primary care provider on file. Date of Admission: 1/20/2021    Chief Complaint:   Shortness of Breath     Pt to ED from oncology tx. Pt has hx of stage 4 lung cx and was receiving first tx when his oxygen decreased. Pt on 4L of O2 via NC with an oxygen saturation of 99%. Pt's oxygen 78% on RA. Hospital Course:56 yo male with recent Dx of Metastatic lung Ca and started treatment 2 days ago, he has been struggling with pain mgt and SOB. He was at treatment and found to be hypoxia in the 70's. He responds to nasal cannula O2 to maintain sats > 90's. Work up revealed new PE, reoccurrence of large malignant pleural effusion as well as progressive adenopathy. CTS, Pulm, and Onc consulted in the ED. He was admitted by night hospitalist and remained in the ED 2/2 to overflow bed placement issues for some time. Subjective: EMR and notes reviewed pt seen and examined, Ill appearing male with c/o back pain and sob. He states the lian and breathing are better with medications. Currently Teodora Pardo is at bedside, they are discussing POA/ living will. He has been see today by Tenisha Mederos myself and palliative care.        Medications:  Reviewed    Infusion Medications    heparin (Porcine) 1,678 Units/hr (01/22/21 0921)     Scheduled Medications    magic (miracle) mouthwash with nystatin  5 mL Swish & Swallow 4x Daily PC & HS    folic acid  1 mg Oral Daily    oxyCODONE  20 mg Oral 2 times per day    sodium chloride flush  10 mL Intravenous 2 times per day    cefepime  2,000 mg Intravenous Q12H    metroNIDAZOLE  500 mg Intravenous Q8H    vancomycin  1,500 mg Intravenous Q12H     PRN Meds: loperamide, oxyCODONE, morphine, HYDROmorphone, sodium chloride flush, potassium chloride, magnesium sulfate, promethazine **OR** ondansetron, famotidine, acetaminophen **OR** acetaminophen, heparin (porcine), heparin (porcine)      Intake/Output Summary (Last 24 hours) at SPECGRAV 1.025 12/24/2020    GLUCOSEU Negative 12/24/2020       Radiology:  CT CHEST PULMONARY EMBOLISM W CONTRAST   Final Result   1. Stable pulmonary emboli within the left lower lobe. New left upper lobe   pulmonary embolus in a 1st or 2nd order branch. No evidence of right heart   strain. 2. Stable large malignant pleural collection on the right with collapse of   the right lung. New moderate left pleural effusion with diffuse interstitial   edema, possibly lymphangitic tumor. 3. Progressive extensive mediastinal and bilateral hilar adenopathy as well   as right axillary adenopathy. Metastatic adenopathy in the upper abdomen   with small quantity of perihepatic ascites. 4. Diffuse esophageal wall thickening, likely an esophagitis. Findings were discussed with Dr. Shawna Moya at 6:01 pm on 1/20/2021. XR CHEST PORTABLE   Final Result   Stable chest x-ray compared to January 18, 2021. Redemonstration of complete opacification of the right hemithorax with mild   mediastinal shift towards the left. Persistent vascular congestive changes are present on the left. VL Extremity Venous Right    (Results Pending)           Assessment/Plan:    Active Hospital Problems    Diagnosis    Bilateral pleural effusion [J90]    Acute respiratory failure (HCC) [J96.00]    Malignant pleural effusion [J91.0]    Malignant neoplasm of lung (HCC) [C34.90]     Acute Respiratory failure with hypoxia - multifactorial in the setting of lung cancer/ mesothelioma with mets/PE, reoccurrence of malignant effusion and what looks like post obstructive PNA   - POA with hypoxia, SOB, and Leukocytosis   - CTS/Pulm/ONC consulted and following  - cont supportive O2 as tolerated to sats > 90 %   - cont empiric abx coverage X 5 days  - Heparin gtt for PE was on Eliquis and again has new PE's, need t0 consider NOAC failure.  Since no intervention are planned resume NOAC  - PLeurex cath for symptom mgt - IR and CTS feel

## 2021-01-22 NOTE — CARE COORDINATION
Palliative care consult noted. Prognosis poor per attending's progress note. Patient open to hospice but wants to meet with hem/onc again to see if any additional treatment options are available first per palliative care note. From home where he lives with his mother.

## 2021-01-22 NOTE — ACP (ADVANCE CARE PLANNING)
Advance Care Planning Note    Name: Jake Oliver  YOB: 1965  MRN: [id number]  Admission Date: 1/20/2021  3:59 PM    Date of Discussion: 1/21/21    Active Diagnoses:    Acute resp failure  Met lung ca     These active diagnoses are of sufficient risk that focused discussion on advance care planning is indicated in order to allow the patient to thoughtfully consider personal goals of care; and, if situations arise that prevent the ability to personally give input, to ensure appropriate representation of their personal desires for different levels and agressiveness of care. Discussion:    Persons present and participating in discussion: Christian Lazar,    Patient's decisional capacity or surrogate:  good    Discussion in summary:   We discussed prognosis and quality of life issues. Pt is aware that he wa given 2 month w/o treatment and a year at best with treatment. We dicussed that a year of being in the hospital and undergoing treatment that may cause a worsening of quality life may not be in his best interest. Alternatively he could go home and have palliative care hospice, he would like to hear from his oncology team before he would be agreeable to Hospice. As for now he would like to [address] [place] [address] a Full code until further decisions can be made. Code status: FUll     Time Spent:     Total time spent face-to-face in education and discussion: 30 minutes.     Electronically signed by CARTER Brannon CNP on 1/21/2021 at 8:18 PM

## 2021-01-22 NOTE — PROGRESS NOTES
________________________________________________________________    Bernard Ee, CNP  1/22/2021  11:09 AM

## 2021-01-22 NOTE — PLAN OF CARE
Assoc Dx: Mesothelioma (malignant, clinical disorder) (disorder) LOT: 1st Line Metastatic or Recurrent 01/19/2021 C2 D1  Pemetrexed IV, 1015 mg (500 mg/m2)   Cisplatin IV, 152 mg (75 mg/m2)   Sodium Chloride IV 0.9 %, 1000 mL   Potassium Chloride IV, 10 mEq   Magnesium Sulfate IV, 1 gram   Sodium Chloride IV 0.9 %, 1000 mL   Cyanocobalamin IM, 1000 mcg   Cyanocobalamin IM, 1000 mcg  Vitamin B12 Cycle Length: 28 Number Cycles: 6 Start: Anne Dickerson on 01/12/2021 Assoc Dx: Mesothelioma (malignant, clinical disorder) (disorder) LOT: 1st Line Metastatic or Recurrent 01/12/2021 C1 D1  Cyanocobalamin Subcutaneous, 1000 mcg  Interval History   Melody Wiseman returns for a follow-up visit and for cycle 1, day 1 cisplatin/alimta. He is still having swelling of the right arm and shortness of breath, He is checking his O2 levels at home and he is >90%. Today he is 86% on RA. Denies chest pain, fever, cough, N/V, GI changes or bleeding. Review of Systems  Constitutional: No weight loss, No fever, No chills, No night sweats. Energy level good.   Cardiovascular: No chest pain, palpitations, new edema, or calf discomfort  Respiratory: No pain, hemoptysis, change to breathing  Gastrointestinal: No pain, cramping, jaundice, change to eating and bowel habits  Urinary: No pain, bleeding or change in continence  Musculoskeletal: No redness, pain, edema or weakness  Skin: No pruritus, rash, change to nodules or lesions  Neurologic: No discomfort, change in mental status, speech, sensory or motor activity  Psychiatric: No change in concentration or change to affect or mood  Endocrine: No hot flashes, increased thirst, or change to urine production  Hematologic: No petechiae, ecchymosis or bleeding  Lymphatic: No lymphadenopathy or lymphedema  Allergy / Immunologic: No eczema, hives, frequent or recurrent infections      Vital Signs  Blood pressure: 100/68, L arm, Regular, Pulse: 138, Temperature: 97.5 F, Respirations: 15, O2 sat: , Pain Scale: 8, Height: 70.5 in, Weight: 182 lb, BSA: 2.03, BMI: 25.74 kg/m2   Physical Exam   CONSTITUTIONAL: awake, alert, cooperative, no apparent distress   EYES: pupils equal, round and reactive to light, sclera clear and conjunctiva normal  ENT: Normocephalic, without obvious abnormality, atraumatic  NECK: supple, symmetrical, no jugular venous distension and no carotid bruits   HEMATOLOGIC/LYMPHATIC: no cervical, supraclavicular or axillary lymphadenopathy   LUNGS: no increased work of breathing and clear to auscultation   CARDIOVASCULAR: regular rate and rhythm, normal S1 and S2, no murmur noted  ABDOMEN: normal bowel sounds x 4, soft, non-distended, non-tender, no masses palpated, no hepatosplenomegaly   MUSCULOSKELETAL: full range of motion noted, tone is normal  NEUROLOGIC: awake, alert, oriented to name, place and time. Motor skills grossly intact. SKIN: Normal skin color, texture, turgor and no jaundice.  appears intact   EXTREMITIES: Without cyanosis, clubbing, edema or asymmetry    Labs  CBC   Lab Results 01/20/2021 12/31/2020 12/29/2020 12/27/2020 12/24/2020   CBC                  WBC x 10^3/uL 13.0 (H)               RBC x 10^6/uL 4.65               HGB g/dL 12.6 (L)               HCT % 38.4 (L)               MCV fL 82.6               MCH pg 27.1               MCHC g/dL 32.8               RDW-CV, % 18.0 (H)               PLT x 10^3/uL 559.0 (H)               Jay % 82.6 (H)               LY % 8.6 (L)               MO % 7.6               EO % 0.8               BA % 0.4               Jay # (ANC) x 10^3/uL 10.71 (H)               LY # x 10^3/uL 1.12 (L)               MO # x 10^3/uL 0.98 (H)               EO # x 10^3/uL 0.11               BA # x 10^3/uL 0.05               CMP   Lab Results 01/20/2021 12/31/2020 12/29/2020 12/27/2020 12/24/2020   Chemistries                  Glucose mg/dL 135 (H)               BUN mg/dL 15               Creatinine mg/dL 0.5 (L)               Sodium mmol/L 142               Potassium mmol/L 4.5 Chloride mmol/L 105               CO2 mmol/L 30               Calcium mg/dL 8.8               Albumin g/dL 2.3 (L)               Total protein g/dL 6.3 (L)               Bilirubin, total mg/dL 0.4               Alkaline phosphatase U/L 124               AST/SGOT U/L 20               ALT/SGPT U/L 16               GFR non-African American, estimated mL/min/1.73m2 >60.0               GFR African American, estimated mL/min/1.73m2 >60.0               Imaging  Ct Abdomen Pelvis W Iv Contrast Additional Contrast? None  Result Date: 2020  Chest: Small pulmonary emboli left lower lobe Marked abnormal mediastinal adenopathy, with compression and obscuration of the airways on the right. There is collapse and consolidation seen of the right upper, right middle lobe, and right lower lobe. There is abnormal pleural thickening on the right, suspicious for pleural implants, and a large loculated pleural effusion on the right. Metastatic adenopathy seen in the right axillary region, and supraclavicular region. Abdomen pelvis: There is widespread infiltrative nodular soft tissue density throughout the abdomen and pelvis, compatible with sequelae of peritoneal carcinomatosis. There is abnormal adenopathy in the gastrohepatic ligament, and retroperitoneum along with mild splenomegaly Ill-defined hypodense nodule in the liver and spleen, possibly metastatic foci Results discussed with Zach Polk by Melinda Nevarez. Regan Poe MD at 2:23 pm on 2020     Ct Chest Pulmonary Embolism W Contrast  Result Date: 2020  Chest: Small pulmonary emboli left lower lobe Marked abnormal mediastinal adenopathy, with compression and obscuration of the airways on the right. There is collapse and consolidation seen of the right upper, right middle lobe, and right lower lobe. There is abnormal pleural thickening on the right, suspicious for pleural implants, and a large loculated pleural effusion on the right.  Metastatic adenopathy seen in the right axillary region, and supraclavicular region. Abdomen pelvis: There is widespread infiltrative nodular soft tissue density throughout the abdomen and pelvis, compatible with sequelae of peritoneal carcinomatosis. There is abnormal adenopathy in the gastrohepatic ligament, and retroperitoneum along with mild splenomegaly Ill-defined hypodense nodule in the liver and spleen, possibly metastatic foci Results discussed with Daiana Doyle by Pedro Garcia. Danica Grant MD at 2:23 pm on 12/24/2020     PATHOLOGY    Axillary LN Biopsy 12/31/2020:    FINAL DIAGNOSIS:     Lymph node, right axilla, core biopsy:   - Metastatic mesothelioma, epithelioid type. OCM - Patient Care Management    Pain, if applicable:   Date of Service: 01/20/2021  Pain Scale (0-10): 8  Pain Treatment Plan: Use of Opioids  Comment:         Performance Status: ECOG 2 In bed <50% of the time. Ambulatory and capable of all self-care, but unable to carry out any work activities. Up and about more than 50% of waking hours. (Date: 01/20/2021)     Depression Status: Was screened; Outcome positive: No; Screening Date: 01/15/2021; Screening Tool: Patient Health Questionnaire (PHQ9)  Psycho-social PHQ-9 Follow-up Plan (if applicable):     Research    Would you like this patient screened for clinical trial eligibility? If yes, please enter the order for \"Research: Screen for Eligibility\"    Assessment & Plan  1.  Stage IV Mesothelioma, epithelioid type    - CT CAP 12/24/2020 with mediastinal LAD with airway compression, R lung consolidation and collapse, pleural implants, pleural effusion, axillary and supraclavicular LAD, peritoneal carcinomatosis and possible liver and spleen metastasis  - CT scan highly suspicious for malignancy  - pleural fluid cytology negative  - axillary LN biopsy shows mesothelioma, epithelioid type  - reviewed pathology  - pt has extensive metastatic disease  - recommend initial chemotherapy with cisplatin/alimta x 6 cycles then maintenance alimta if having a response  - will give B12 shot today and start folate   - reviewed side effects and toxicity including N/V, myelosuppression, nephrotoxicity, neuropathy, ototoxicity  - OCM completed 1/15/20  - repeat scans after 3 cycles  - will also send Caris testing  - Here today for cycle 1, day 1 cisplatin/alimta  - RTO in 1 week for toxicity check       2. Pulmonary Embolism  - risk factor is suspected malignancy  - continue Eliquis    3. Pleural Effusion  - suspect malignant  - thoracentesis done in ER  - cytology negative  - R base BS dulled   - CXR on 1/18/20 showed no pleural effusion    4. Pain  - pain contract signed 1/12/21  - Continue oxycontin and percocet    5. Dyspnea  -O2 sat 86% on RA  -O2 sat on 2L 84%  -O2 sats on 4L nasal 94%    After chemo sats dropped to 80% on RA. He was then transported by ambulance to [facility]. Time Based Itemization    A total of  minutes was spent on today's patient encounter. If applicable, non-patient-facing activities:  ( ) Preparing to see the patient and reviewing records  ( ) Individual interpretation of results   ( ) Discussion or coordination of care with other health care professionals  ( ) Ordering of unique tests, medications, or procedures  ( ) Documentation within the EHR   . Recent imaging and labs were reviewed and discussed with the patient. I have recommended that the patient follow CDC guidelines for prevention of COVID-19 infection. Mackenzie Latham CNP  [facility]  Phone: [phone]  Fax: [phone]  Online: [email or web address]     Note Recipients:         Electronically signed by Ching Luo.  Derrick MASON 01/20/2021 16:22 EST

## 2021-01-22 NOTE — PROGRESS NOTES
General: Swelling present. Comments: RUE swelling   Lymphadenopathy:      Cervical: No cervical adenopathy. Skin:     General: Skin is warm and dry. Findings: No rash. Neurological:      Mental Status: He is alert. Cranial Nerves: No cranial nerve deficit. Comments: CN 2-12 grossly intact            Data Reviewed:   LABS:  CBC:  Recent Labs     01/20/21  1620 01/21/21  0545 01/22/21  0333   WBC 16.4* 11.2* 13.6*   HGB 12.2* 11.7* 11.8*   HCT 37.4* 36.2* 36.6*   MCV 81.5 81.1 81.6   * 638* 640*     BMP:  Recent Labs     01/20/21  1620 01/21/21  0545 01/22/21  0333   * 138 133*   K 5.0 5.2* 4.3    104 99   CO2 27 29 27   BUN 15 15 24*   CREATININE <0.5* 0.7* 0.7*     LIVER PROFILE:   Recent Labs     01/20/21  1620 01/21/21  0545 01/22/21  0333   AST 9* 8* 20   ALT 8* 8* 17   BILITOT 0.3 <0.2 <0.2   ALKPHOS 136* 115 102     PT/INR:  Recent Labs     01/20/21 1620   PROTIME 15.1*   INR 1.30*     APTT:   Recent Labs     01/21/21  1422 01/21/21 2055 01/22/21  0333   APTT 45.1* 71.1* 56.6*     UA:No results for input(s): NITRITE, COLORU, PHUR, LABCAST, WBCUA, RBCUA, MUCUS, TRICHOMONAS, YEAST, BACTERIA, CLARITYU, SPECGRAV, LEUKOCYTESUR, UROBILINOGEN, BILIRUBINUR, BLOODU, GLUCOSEU, AMORPHOUS in the last 72 hours. Invalid input(s): KETONESU  No results for input(s): PHART, PIT1GIG, PO2ART in the last 72 hours. Vent Information  SpO2: 96 %    CT chest personally reviewed, left sided pulmonary emboli, large right sided pleural effusion mediastinal and hilar adenopathy, esophageal thickening           Assessment:     1. Recurrent right pleural effusion, malignant  2. Mesothelioma, advanced stage  3. Provoked PE, from above  4. Chronic resp failure, hypoxic from above issues  5.  Intractable pain    Plan:      -Titrate supplemental oxygen, anticipate chronic need  -Anticoagulation, since there is no plan for interventions can likely switch to appropriate oral therapy per direction

## 2021-01-22 NOTE — FLOWSHEET NOTE
01/22/21 1421   Encounter Summary   Services provided to: Patient   Referral/Consult From: Multi-disciplinary team   Support System Family members   Continue Visiting   (1-22, completed ADs. Hospice consult)   Complexity of Encounter Moderate   Length of Encounter 45 minutes   Advance Directives (For Healthcare)   Healthcare Directive Yes, patient has an advance directive for healthcare treatment   Type of Healthcare Directive Durable power of  for health care;Living will   Copy in Chart Yes, copy in chart   Chart Copy Status : Current   Date Reviewed and Current: 01/22/21   Healthcare Agent Appointed Adult [address] [place] [address] Agent's Name   (Gage Velez (daughter))   Healthcare Agent's Phone Number   ([phone])   If you are unable to speak for yourself, does your Healthcare Agent or Legal Spokesperson know your healthcare wishes?  Yes

## 2021-01-22 NOTE — PROGRESS NOTES
Per patient request, notified MD, new order for limited code NO X 4, HOC consult and Ativan 1mg Q4 hours prn.   Writer called [address] and notified them of consult

## 2021-01-22 NOTE — PROGRESS NOTES
IR was contacted regarding possible placement of pleurex catheter. The imaging and the patient's chart were reviewed. 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 Prior thoracentesis yielded little if any result. Given the thick rind of pleural soft tissue the risk of bleeding is very high as is the risk of infection with an indwelling catheter. As there is little to no benefit to draining pleural fluid in the setting of central bronchial occlusion and the risks of pleurex placement in this patient are particularly high, IR recommends no intervention at this time.

## 2021-01-22 NOTE — ACP (ADVANCE CARE PLANNING)
helped patient complete a health care power of  and living will. He confirmed he wants his daughter, Karla Lopez, to be his primary decision maker if he is incapacitated. He states he has discussed his wishes with her. He states he is told he should be a \"DNR\" but is still a full code. It sounds like he is still discerning this. He shared that this all started just a few weeks ago and he is trying to process things. He states no spiritual needs at this time. Offered continued PRN support.

## 2021-01-22 NOTE — PROGRESS NOTES
Hospitalist Progress Note      PCP: No primary care provider on file. Date of Admission: 1/20/2021    Chief Complaint:   Shortness of Breath     Pt to ED from oncology tx. Pt has hx of stage 4 lung cx and was receiving first tx when his oxygen decreased. Pt on 4L of O2 via NC with an oxygen saturation of 99%. Pt's oxygen 78% on RA. Hospital Course:56 yo male with recent Dx of Metastatic lung Ca and started treatment 2 days ago, he has been struggling with pain mgt and SOB. He was at treatment and found to be hypoxia in the 70's. He responds to nasal cannula O2 to maintain sats > 90's. Work up revealed new PE, reoccurrence of large malignant pleural effusion as well as progressive adenopathy. CTS, Pulm, and Onc consulted in the ED. He was admitted by night hospitalist and remained in the ED 2/2 to overflow bed placement issues for some time. Subjective: EMR and notes reviewed pt seen and examined, Ill appearing male with c/o back pain and sob.        Medications:  Reviewed    Infusion Medications    heparin (Porcine) 1,678 Units/hr (01/21/21 1600)     Scheduled Medications    sodium chloride flush  10 mL Intravenous 2 times per day    cefepime  2,000 mg Intravenous Q12H    metroNIDAZOLE  500 mg Intravenous Q8H    vancomycin  1,500 mg Intravenous Q12H     PRN Meds: morphine, HYDROmorphone, sodium chloride flush, potassium chloride, magnesium sulfate, promethazine **OR** ondansetron, famotidine, acetaminophen **OR** acetaminophen, heparin (porcine), heparin (porcine)      Intake/Output Summary (Last 24 hours) at 1/21/2021 1946  Last data filed at 1/21/2021 1454  Gross per 24 hour   Intake 500 ml   Output 800 ml   Net -300 ml       Physical Exam Performed:    /78   Pulse 104   Temp 97.6 °F (36.4 °C) (Oral)   Resp 24   Ht 6' 1\" (1.854 m)   Wt 200 lb (90.7 kg)   SpO2 95%   BMI 26.39 kg/m²     General appearance: Ill appearing, side lying in bed   HEENT: Pupils equal, round, and reactive to light. Conjunctivae/corneas clear. Neck: Supple, with full range of motion. No jugular venous distention. Trachea midline. Respiratory:  Dyspnea with shallow breathing Right very diminished   Cardiovascular: Regular rate and rhythm with normal S1/S2 without murmurs, rubs or gallops. Abdomen: Soft, non-tender, non-distended with normal bowel sounds. Musculoskeletal: No clubbing, cyanosis or edema bilaterally. Full range of motion without deformity. Skin: Skin color, texture, turgor normal.  No rashes or lesions. Neurologic:  Neurovascularly intact without any focal sensory/motor deficits. Cranial nerves: II-XII intact, grossly non-focal.  Psychiatric: Alert and oriented, thought content appropriate, normal insight  Capillary Refill: Brisk,< 3 seconds   Peripheral Pulses: +2 palpable, equal bilaterally       Labs:   Recent Labs     01/20/21  1620 01/21/21  0545   WBC 16.4* 11.2*   HGB 12.2* 11.7*   HCT 37.4* 36.2*   * 638*     Recent Labs     01/20/21  1620 01/21/21  0545   * 138   K 5.0 5.2*    104   CO2 27 29   BUN 15 15   CREATININE <0.5* 0.7*   CALCIUM 8.1* 8.5     Recent Labs     01/20/21  1620 01/21/21  0545   AST 9* 8*   ALT 8* 8*   BILITOT 0.3 <0.2   ALKPHOS 136* 115     Recent Labs     01/20/21  1620   INR 1.30*     Recent Labs     01/20/21  1620   TROPONINI <0.01       Urinalysis:      Lab Results   Component Value Date    NITRU Negative 12/24/2020    BLOODU Negative 12/24/2020    SPECGRAV 1.025 12/24/2020    GLUCOSEU Negative 12/24/2020       Radiology:  CT CHEST PULMONARY EMBOLISM W CONTRAST   Final Result   1. Stable pulmonary emboli within the left lower lobe. New left upper lobe   pulmonary embolus in a 1st or 2nd order branch. No evidence of right heart   strain. 2. Stable large malignant pleural collection on the right with collapse of   the right lung. New moderate left pleural effusion with diffuse interstitial   edema, possibly lymphangitic tumor.    3. Progressive extensive mediastinal and bilateral hilar adenopathy as well   as right axillary adenopathy. Metastatic adenopathy in the upper abdomen   with small quantity of perihepatic ascites. 4. Diffuse esophageal wall thickening, likely an esophagitis. Findings were discussed with Dr. Yaritza Cunningham at 6:01 pm on 1/20/2021. XR CHEST PORTABLE   Preliminary Result   Stable chest x-ray compared to January 18, 2021. Redemonstration of complete opacification of the right hemithorax with mild   mediastinal shift towards the left. Persistent vascular congestive changes are present on the left.          IR INSERT TUNNELED PLEURA CATH W CUFF    (Results Pending)           Assessment/Plan:    Active Hospital Problems    Diagnosis    Acute respiratory failure (HCC) [J96.00]     Acute Respiratory failure with hypoxia - multifactorial in the setting of lung cancer/ mesothelioma with mets/PE, reoccurrence of malignant effusion and what looks like post obstructive PNA   - POA with hypoxia, SOB, and Leukocytosis   - CTS/Pulm/ONC consulted and following  - cont supportive O2 as tolerated to sats > 90 %   - cont empiric abx coverage  - Heparin gtt for PE was on Eliquis and again has new PE's, need t0 consider NOAC failure  - PLeurex cath for symptom mgt   - pain mgt   - Discussed in light of he poor prognosis quality of life may consider Hospice, pt would like to hear Onc input             DVT Prophylaxis: heparin gtt   Diet: Diet NPO, After Midnight Exceptions are: Ice Chips, Sips with Meds, Popsicles  DIET GENERAL;  Code Status: Full Code    PT/OT Eval Status: not yet ordered     Dispo - pending clinical course     CARTER Hernandez - CNP

## 2021-01-23 ENCOUNTER — APPOINTMENT (OUTPATIENT)
Dept: GENERAL RADIOLOGY | Age: 56
DRG: 694 | End: 2021-01-23
Payer: MEDICAID

## 2021-01-23 VITALS
WEIGHT: 190.04 LBS | BODY MASS INDEX: 25.19 KG/M2 | HEIGHT: 73 IN | HEART RATE: 108 BPM | RESPIRATION RATE: 18 BRPM | DIASTOLIC BLOOD PRESSURE: 83 MMHG | SYSTOLIC BLOOD PRESSURE: 128 MMHG | TEMPERATURE: 97.9 F | OXYGEN SATURATION: 99 %

## 2021-01-23 LAB
APTT: 47.3 SEC (ref 24.2–36.2)
SOLUBLE TRANSFERRIN RECEPT: 2.1 MG/L (ref 2.2–5)

## 2021-01-23 PROCEDURE — 85730 THROMBOPLASTIN TIME PARTIAL: CPT

## 2021-01-23 PROCEDURE — 6370000000 HC RX 637 (ALT 250 FOR IP): Performed by: NURSE PRACTITIONER

## 2021-01-23 PROCEDURE — 99024 POSTOP FOLLOW-UP VISIT: CPT | Performed by: THORACIC SURGERY (CARDIOTHORACIC VASCULAR SURGERY)

## 2021-01-23 PROCEDURE — 2500000003 HC RX 250 WO HCPCS: Performed by: INTERNAL MEDICINE

## 2021-01-23 PROCEDURE — 31720 CLEARANCE OF AIRWAYS: CPT

## 2021-01-23 PROCEDURE — 2580000003 HC RX 258: Performed by: INTERNAL MEDICINE

## 2021-01-23 PROCEDURE — 36415 COLL VENOUS BLD VENIPUNCTURE: CPT

## 2021-01-23 PROCEDURE — 71045 X-RAY EXAM CHEST 1 VIEW: CPT

## 2021-01-23 RX ORDER — MORPHINE SULFATE 100 MG/5ML
10 SOLUTION ORAL
Qty: 118 ML | Refills: 0 | Status: SHIPPED | OUTPATIENT
Start: 2021-01-23 | End: 2021-01-26

## 2021-01-23 RX ORDER — LORAZEPAM 1 MG/1
1 TABLET ORAL EVERY 8 HOURS PRN
Qty: 20 TABLET | Refills: 0 | Status: SHIPPED | OUTPATIENT
Start: 2021-01-23 | End: 2021-02-22

## 2021-01-23 RX ORDER — LORAZEPAM 1 MG/1
1 TABLET ORAL EVERY 8 HOURS PRN
Qty: 20 TABLET | Refills: 0 | Status: SHIPPED | OUTPATIENT
Start: 2021-01-23 | End: 2021-01-23 | Stop reason: SDUPTHER

## 2021-01-23 RX ORDER — MORPHINE SULFATE 30 MG/1
30 TABLET, FILM COATED, EXTENDED RELEASE ORAL 2 TIMES DAILY
Qty: 60 TABLET | Refills: 0 | Status: SHIPPED | OUTPATIENT
Start: 2021-01-23 | End: 2021-01-23 | Stop reason: SDUPTHER

## 2021-01-23 RX ORDER — MORPHINE SULFATE 30 MG/1
30 TABLET, FILM COATED, EXTENDED RELEASE ORAL 2 TIMES DAILY
Qty: 60 TABLET | Refills: 0 | Status: SHIPPED | OUTPATIENT
Start: 2021-01-23 | End: 2021-02-22

## 2021-01-23 RX ORDER — MORPHINE SULFATE 100 MG/5ML
10 SOLUTION ORAL
Qty: 118 ML | Refills: 0 | Status: SHIPPED | OUTPATIENT
Start: 2021-01-23 | End: 2021-01-23 | Stop reason: SDUPTHER

## 2021-01-23 RX ADMIN — FOLIC ACID 1 MG: 1 TABLET ORAL at 09:03

## 2021-01-23 RX ADMIN — OXYCODONE HYDROCHLORIDE 20 MG: 10 TABLET, FILM COATED, EXTENDED RELEASE ORAL at 09:03

## 2021-01-23 RX ADMIN — Medication 10 ML: at 09:04

## 2021-01-23 RX ADMIN — LIDOCAINE HYDROCHLORIDE 5 ML: 20 SOLUTION ORAL; TOPICAL at 10:50

## 2021-01-23 RX ADMIN — HEPARIN SODIUM 1678 UNITS/HR: 10000 INJECTION, SOLUTION INTRAVENOUS at 05:24

## 2021-01-23 RX ADMIN — LIDOCAINE HYDROCHLORIDE 5 ML: 20 SOLUTION ORAL; TOPICAL at 09:03

## 2021-01-23 ASSESSMENT — PAIN SCALES - GENERAL
PAINLEVEL_OUTOF10: 5
PAINLEVEL_OUTOF10: 5

## 2021-01-23 NOTE — PROGRESS NOTES
possible aspiration. pt sounds very wet. RT at bedside trying to suction . white frothy sputum suctioned . Hr at125. o2 increase from 4l to 8l. sat at 94%. Rt recommend X-ray. any order recommendation?

## 2021-01-23 NOTE — PROGRESS NOTES
CVTS Thoracic Progress Note:          CC: bilateral pleural effusions    Subj: sitting up in bed, appears somewhat SOB despite supplemental oxygen (4L per NC, sat 97%). Obj:    Blood pressure 123/79, pulse 110, temperature 97.5 °F (36.4 °C), temperature source Oral, resp. rate 20, height 6' 1\" (1.854 m), weight 190 lb 0.6 oz (86.2 kg), SpO2 98 %. Lungs diminished R>L   Abdomen soft, non-tender     Diagnostics:   Recent Labs     01/20/21  1620 01/21/21  0545 01/22/21  0333   WBC 16.4* 11.2* 13.6*   HGB 12.2* 11.7* 11.8*   HCT 37.4* 36.2* 36.6*   * 638* 640*                                                                  Recent Labs     01/20/21  1620 01/21/21  0545 01/22/21  0333   * 138 133*   K 5.0 5.2* 4.3    104 99   CO2 27 29 27   BUN 15 15 24*   CREATININE <0.5* 0.7* 0.7*   GLUCOSE 142* 125* 96     Recent Labs     01/20/21  1620   TROPONINI <0.01     Recent Labs     01/20/21  1620   INR 1.30*     CXR: 1/20/21   Impression   Stable chest x-ray compared to January 18, 2021.       Redemonstration of complete opacification of the right hemithorax with mild   mediastinal shift towards the left.       Persistent vascular congestive changes are present on the left. Assess/Plan:   Care per primary team, Pulmonology and Oncology    Bilateral pleural effusions:   Unfortunately the right side pleural effusion is chronic with Mesothelioma (stage IV) trapped lung - will not re-expand. If pt's respiratory status deteriorates he may need a thoracentesis on the left side. If at that time the left lung re-expanded and the fluid re-accumulated rapidly we could discuss possible placement of a left sided Pleur-X catheter (although this patient would be considered high risk due to his medical complexity). Palliative care RN Norman Joshua) coming to bedside for discussion with patient this afternoon.

## 2021-01-23 NOTE — ONCOLOGY
ONCOLOGY HEMATOLOGY CARE PROGRESS NOTE      SUBJECTIVE:     5 LPM by NC. Afebrile. See below for discussion. ROS:   The remaining 10 point review of symptoms is unremarkable. OBJECTIVE        Physical    VITALS:  /79   Pulse 110   Temp 97.5 °F (36.4 °C) (Oral)   Resp 20   Ht 6' 1\" (1.854 m)   Wt 190 lb 0.6 oz (86.2 kg)   SpO2 98%   BMI 25.07 kg/m²   TEMPERATURE:  Current - Temp: 97.5 °F (36.4 °C); Max - Temp  Av.7 °F (36.5 °C)  Min: 97.3 °F (36.3 °C)  Max: 97.9 °F (36.6 °C)  PULSE OXIMETRY RANGE: SpO2  Av.4 %  Min: 94 %  Max: 98 %  24HR INTAKE/OUTPUT:      Intake/Output Summary (Last 24 hours) at 2021 0935  Last data filed at 2021 1757  Gross per 24 hour   Intake --   Output 350 ml   Net -350 ml       CONSTITUTIONAL:  awake, alert, cooperative, no apparent distress, HEENT oral pharynx , no scleral icterus  HEMATOLOGIC/LYMPHATICS:  no cervical lymphadenopathy, no supraclavicular lymphadenopathy, no axillary lymphadenopathy and no inguinal lymphadenopathy  LUNGS:  No increased work of breathing, good air exchange, clear to auscultation bilaterally, no crackles or wheezing  CARDIOVASCULAR:  , regular rate and rhythm, normal S1 and S2, no S3 or S4, and no murmur noted  ABDOMEN:  No scars, normal bowel sounds, soft, non-distended, non-tender, no masses palpated, no hepatosplenomegally  MUSCULOSKELETAL:  There is no redness, warmth, or swelling of the joints. EXTREMETIES: No clubbing cynosis or edema  NEUROLOGIC:  Awake, alert, oriented to name, place and time. Cranial nerves II-XII are grossly intact. Motor is 5 out of 5 bilaterally.    SKIN:  no bruising or bleeding      Data      Recent Labs     21  1620 21  0545 21  0333   WBC 16.4* 11.2* 13.6*   HGB 12.2* 11.7* 11.8*   HCT 37.4* 36.2* 36.6*   * 638* 640*   MCV 81.5 81.1 81.6        Recent Labs     21  1620 21  0545 21  0333   * 138 133*   K 5.0 5.2* 4.3    104 99   CO2 27 29 27   BUN 15 15 24*   CREATININE <0.5* 0.7* 0.7*     Recent Labs     01/20/21  1620 01/21/21  0545 01/22/21  0333   AST 9* 8* 20   ALT 8* 8* 17   BILITOT 0.3 <0.2 <0.2   ALKPHOS 136* 115 102       Magnesium:  No results found for: MG      Problem List  Patient Active Problem List   Diagnosis    Single subsegmental pulmonary embolism without acute cor pulmonale (HCC)    Axillary lymphadenopathy    Mediastinal mass    SVC syndrome    Malignant pleural effusion    Malignant neoplasm of lung (HCC)    Severe protein-calorie malnutrition (HCC)    Acute respiratory failure (HCC)    Bilateral pleural effusion       ASSESSMENT AND PLAN    Mesothelioma with diffuse mets (liver and peritoneal carcinomatosis)   - Received C1D1 Cisplatin/Alimta on 1/20/2021.  - Due for cycle 2 chemo on 2/10- Cisplat Alimta.  - Folate replacement.     Acute Resp Failure; bilateral pleural effusions R>L   - no plans for pleurx drain   - home O2      Thrush/Esophagitits   - Nystatin ordered      Diarrhea  - Imodium PRN      DVT R arm /PE   - hep drip   - Eliquis PTA - appears to have failed this with new PE in the LUE- no signs of right heart strain      Anemia with thrombocytosis  - check iron labs      Agree with palliative care consult. I counseled him that he is unlikely to open his right lung back up and unlikely to get significant pain relief with chemo, though he might live a bit longer with his current quality of life. He is strongly leaning toward hospice. ONCOLOGIC DISPOSITION:  Likely hospice.     Dennis Thao MD  Please contact through [facility]

## 2021-01-23 NOTE — DISCHARGE SUMMARY
Hospital Medicine Discharge Summary    Patient ID: Mitali Leon      Patient's PCP: No primary care provider on file. Admit Date: 1/20/2021     Discharge Date:  1/23/21    Admitting Physician: Francis Matthews DO     Discharge Physician: CARTER Mei - CNP     Discharge Diagnoses: Active Hospital Problems    Diagnosis    Bilateral pleural effusion [J90]    Acute respiratory failure (HCC) [J96.00]    Malignant pleural effusion [J91.0]    Malignant neoplasm of lung (HCC) [C34.90]       The patient was seen and examined on day of discharge and this discharge summary is in conjunction with any daily progress note from day of discharge. Hospital Course:   59 yo male with recent Dx of Metastatic lung Ca and started treatment 2 days ago, he has been struggling with pain mgt and SOB. He was at treatment and found to be hypoxia in the 70's. He responds to nasal cannula O2 to maintain sats > 90's. Work up revealed new PE, reoccurrence of large malignant pleural effusion as well as progressive adenopathy. CTS, Pulm, and Onc consulted in the ED.      He was admitted by night hospitalist and remained in the ED 2/2 to overflow bed placement issues for some time. Acute Respiratory failure with hypoxia - multifactorial in the setting of lung cancer/ mesothelioma with mets/PE, reoccurrence of malignant effusion and what looks like post obstructive PNA   - POA with hypoxia, SOB, and Leukocytosis   - CTS/Pulm/ONC consulted and following  - cont supportive O2 as tolerated to sats > 90 %   - cont empiric abx coverage X 5 days  - Heparin gtt for PE was on Eliquis and again has new PE's, need t0 consider NOAC failure.  Since no intervention are planned resume NOAC  - PLeurex cath for symptom mgt - IR and CTS feel that at this time it would not be beneficial   - pain mgt - 1/22 started oxycotin and IR and con still have dilaudid if needed   - in light of he poor prognosis quality of life may consider Hospice, pt wanted to hear Onc input. Palliative care in board. Pt decided to chose hospice at home. arrangements were made with [address] [place] [address]. Pt provided Rx for pain meds.      Right arm edema and discomfort- possible DVT Vs SVC syndrome more likely SVC syndrome. Right UE doppler for cot evaluation found no DVR but   Incidental finding of a complex vascular area in the right axillary, area of    biopsy, measuring 4.5 X 2.5 cm. Pt was discharged to home with hospice     Physical Exam Performed:     /75   Pulse 108   Temp 98.5 °F (36.9 °C) (Axillary)   Resp 18   Ht 6' 1\" (1.854 m)   Wt 190 lb 0.6 oz (86.2 kg)   SpO2 96%   BMI 25.07 kg/m²         Labs: For convenience and continuity at follow-up the following most recent labs are provided:      CBC:    Lab Results   Component Value Date    WBC 13.6 01/22/2021    HGB 11.8 01/22/2021    HCT 36.6 01/22/2021     01/22/2021       Renal:    Lab Results   Component Value Date     01/22/2021    K 4.3 01/22/2021    CL 99 01/22/2021    CO2 27 01/22/2021    BUN 24 01/22/2021    CREATININE 0.7 01/22/2021    CALCIUM 8.5 01/22/2021    PHOS 3.8 12/31/2020         Significant Diagnostic Studies    Radiology:   XR CHEST PORTABLE   Final Result   1. Stable appearance of right-sided hydrothorax associated with known   malignant effusion with unchanged mild leftward cardiomediastinal shift. 2. Left moderate pulmonary interstitial edema plus or minus left perihilar   alveolar edema. VL Extremity Venous Right   Final Result      CT CHEST PULMONARY EMBOLISM W CONTRAST   Final Result   1. Stable pulmonary emboli within the left lower lobe. New left upper lobe   pulmonary embolus in a 1st or 2nd order branch. No evidence of right heart   strain. 2. Stable large malignant pleural collection on the right with collapse of   the right lung. New moderate left pleural effusion with diffuse interstitial   edema, possibly lymphangitic tumor.    3. Progressive extensive mediastinal and bilateral hilar adenopathy as well   as right axillary adenopathy. Metastatic adenopathy in the upper abdomen   with small quantity of perihepatic ascites. 4. Diffuse esophageal wall thickening, likely an esophagitis. Findings were discussed with Dr. Yolanda Hicks at 6:01 pm on 1/20/2021. XR CHEST PORTABLE   Final Result   Stable chest x-ray compared to January 18, 2021. Redemonstration of complete opacification of the right hemithorax with mild   mediastinal shift towards the left. Persistent vascular congestive changes are present on the left. Consults:     IP CONSULT TO PULMONOLOGY  IP CONSULT TO CARDIOTHORACIC SURGERY  IP CONSULT TO PALLIATIVE CARE  IP CONSULT TO SPIRITUAL SERVICES  IP CONSULT TO ONCOLOGY  PHARMACY TO DOSE VANCOMYCIN  PHARMACY TO DOSE MEDICATION  IP CONSULT TO HOSPICE    Disposition:  Home with [address] [facility]    Condition at Discharge: Terminal    Discharge Instructions/Follow-up:      Code Status:  Limited     Activity: activity as tolerated    Diet: regular diet      Discharge Medications:     Current Discharge Medication List           Details   morphine (MS CONTIN) 30 MG extended release tablet Take 1 tablet by mouth 2 times daily for 30 days. Qty: 60 tablet, Refills: 0    Comments: Reduce doses taken as pain becomes manageable  Associated Diagnoses: Hospice care      morphine sulfate 20 MG/ML concentrated oral solution Take 0.5 mLs by mouth every 3 hours as needed for Pain for up to 3 days. Qty: 118 mL, Refills: 0    Comments: Reduce doses taken as pain becomes manageable  Associated Diagnoses: Hospice care      LORazepam (ATIVAN) 1 MG tablet Take 1 tablet by mouth every 8 hours as needed for Anxiety for up to 30 days. Qty: 20 tablet, Refills: 0    Associated Diagnoses: Hospice care              Details   oxyCODONE-acetaminophen (PERCOCET) 5-325 MG per tablet Take 1-2 tablets by mouth every 6 hours as needed.        ELIQUIS 5 MG TABS tablet

## 2021-01-23 NOTE — PROGRESS NOTES
[facility]    Met with patient to discuss hospice philosophy and services. He is agreeable to hospice and plan is: Home today    DME to be delivered before 3pm  Transport set up at 4pm with [facility]  Recommendations:  Please have MD sign [facility] which is located in patient's soft chart  Please have MD place discharge order and complete the discharge task per hospital procedure  Prescription request if MD is agreeable:  [address] (formulary for oxycontin) 30 mg every 12 hours  Oxycodone liquid 20mg/ml give 5-10 mg po/sl every 2 hours prn pain/respiratory distress  Ativan 0.5 mg tablet 1-2 tabs every 4 hours prn anxiety or respiratory distress. Updated staff nurse on plan. Notified ZARIA Aragon. Thank you for the opportunity to serve this patient and family.

## 2021-01-23 NOTE — PROGRESS NOTES
Patient discharged home with [facility] ambulance. Went home with [address] [facility] will  Be there at 7pm. Patient d/c with narcotics and ativan.

## 2021-01-24 LAB
BLOOD CULTURE, ROUTINE: NORMAL
CULTURE, BLOOD 2: NORMAL